# Patient Record
Sex: MALE | Race: WHITE | NOT HISPANIC OR LATINO | Employment: FULL TIME | URBAN - NONMETROPOLITAN AREA
[De-identification: names, ages, dates, MRNs, and addresses within clinical notes are randomized per-mention and may not be internally consistent; named-entity substitution may affect disease eponyms.]

---

## 2018-05-11 ENCOUNTER — APPOINTMENT (INPATIENT)
Dept: CT IMAGING | Facility: HOSPITAL | Age: 48
DRG: 309 | End: 2018-05-11
Payer: COMMERCIAL

## 2018-05-11 ENCOUNTER — HOSPITAL ENCOUNTER (INPATIENT)
Facility: HOSPITAL | Age: 48
LOS: 4 days | Discharge: HOME | DRG: 309 | End: 2018-05-15
Attending: EMERGENCY MEDICINE | Admitting: FAMILY MEDICINE
Payer: COMMERCIAL

## 2018-05-11 ENCOUNTER — APPOINTMENT (EMERGENCY)
Dept: RADIOLOGY | Facility: HOSPITAL | Age: 48
DRG: 309 | End: 2018-05-11
Payer: COMMERCIAL

## 2018-05-11 ENCOUNTER — APPOINTMENT (INPATIENT)
Dept: RADIOLOGY | Facility: HOSPITAL | Age: 48
DRG: 309 | End: 2018-05-11
Payer: COMMERCIAL

## 2018-05-11 ENCOUNTER — APPOINTMENT (EMERGENCY)
Dept: CT IMAGING | Facility: HOSPITAL | Age: 48
DRG: 309 | End: 2018-05-11
Payer: COMMERCIAL

## 2018-05-11 DIAGNOSIS — S06.0X9A CONCUSSION WITH LOSS OF CONSCIOUSNESS, INITIAL ENCOUNTER: Primary | ICD-10-CM

## 2018-05-11 DIAGNOSIS — I48.91 ATRIAL FIBRILLATION WITH RAPID VENTRICULAR RESPONSE (HCC): ICD-10-CM

## 2018-05-11 DIAGNOSIS — I48.91 ATRIAL FIBRILLATION WITH RVR (HCC): ICD-10-CM

## 2018-05-11 DIAGNOSIS — R93.89 ABNORMAL FINDING ON CT SCAN: ICD-10-CM

## 2018-05-11 PROBLEM — M25.512 ACUTE PAIN OF LEFT SHOULDER: Status: ACTIVE | Noted: 2018-05-11

## 2018-05-11 PROBLEM — E87.6 HYPOKALEMIA: Status: ACTIVE | Noted: 2018-05-11

## 2018-05-11 PROBLEM — D72.829 LEUKOCYTOSIS: Status: ACTIVE | Noted: 2018-05-11
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
## 2018-05-11 PROBLEM — R55 SYNCOPE AND COLLAPSE: Status: ACTIVE | Noted: 2018-05-11

## 2018-05-11 PROBLEM — S01.81XA FACIAL LACERATION: Status: ACTIVE | Noted: 2018-05-11

## 2018-05-11 PROBLEM — F17.200 SMOKER: Status: ACTIVE | Noted: 2018-05-11
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
## 2018-05-11 LAB
ABO GROUP BLD: NORMAL
AMPHETAMINES SERPL QL SCN: NEGATIVE
ANION GAP SERPL CALCULATED.3IONS-SCNC: 14 MMOL/L (ref 4–13)
APTT PPP: 26 SECONDS (ref 23–35)
BARBITURATES UR QL: NEGATIVE
BASE EX.OXY STD BLDV CALC-SCNC: 83.2 % (ref 60–80)
BASE EXCESS BLDV CALC-SCNC: -1.9 MMOL/L
BASOPHILS # BLD AUTO: 0.04 THOUSANDS/ΜL (ref 0–0.1)
BASOPHILS NFR BLD AUTO: 0 % (ref 0–1)
BENZODIAZ UR QL: NEGATIVE
BLD GP AB SCN SERPL QL: NEGATIVE
BUN SERPL-MCNC: 12 MG/DL (ref 5–25)
CALCIUM SERPL-MCNC: 8.7 MG/DL (ref 8.3–10.1)
CHLORIDE SERPL-SCNC: 102 MMOL/L (ref 100–108)
CO2 SERPL-SCNC: 23 MMOL/L (ref 21–32)
COCAINE UR QL: NEGATIVE
CREAT SERPL-MCNC: 1.07 MG/DL (ref 0.6–1.3)
EOSINOPHIL # BLD AUTO: 0.1 THOUSAND/ΜL (ref 0–0.61)
EOSINOPHIL NFR BLD AUTO: 1 % (ref 0–6)
ERYTHROCYTE [DISTWIDTH] IN BLOOD BY AUTOMATED COUNT: 11.7 % (ref 11.6–15.1)
GFR SERPL CREATININE-BSD FRML MDRD: 82 ML/MIN/1.73SQ M
GLUCOSE SERPL-MCNC: 136 MG/DL (ref 65–140)
GLUCOSE SERPL-MCNC: 182 MG/DL (ref 65–140)
HCO3 BLDV-SCNC: 20.9 MMOL/L (ref 24–30)
HCT VFR BLD AUTO: 40.6 % (ref 36.5–49.3)
HGB BLD-MCNC: 15 G/DL (ref 12–17)
INR PPP: 1.06 (ref 0.86–1.16)
LYMPHOCYTES # BLD AUTO: 5.74 THOUSANDS/ΜL (ref 0.6–4.47)
LYMPHOCYTES NFR BLD AUTO: 47 % (ref 14–44)
MAGNESIUM SERPL-MCNC: 1.9 MG/DL (ref 1.6–2.6)
MCH RBC QN AUTO: 31.3 PG (ref 26.8–34.3)
MCHC RBC AUTO-ENTMCNC: 36.9 G/DL (ref 31.4–37.4)
MCV RBC AUTO: 85 FL (ref 82–98)
METHADONE UR QL: NEGATIVE
MONOCYTES # BLD AUTO: 1.25 THOUSAND/ΜL (ref 0.17–1.22)
MONOCYTES NFR BLD AUTO: 10 % (ref 4–12)
NEUTROPHILS # BLD AUTO: 5.05 THOUSANDS/ΜL (ref 1.85–7.62)
NEUTS SEG NFR BLD AUTO: 42 % (ref 43–75)
O2 CT BLDV-SCNC: 17.7 ML/DL
OPIATES UR QL SCN: NEGATIVE
PCO2 BLDV: 30.6 MM HG (ref 42–50)
PCP UR QL: NEGATIVE
PH BLDV: 7.45 [PH] (ref 7.3–7.4)
PLATELET # BLD AUTO: 302 THOUSANDS/UL (ref 149–390)
PMV BLD AUTO: 9.3 FL (ref 8.9–12.7)
PO2 BLDV: 51.3 MM HG (ref 35–45)
POTASSIUM SERPL-SCNC: 2.9 MMOL/L (ref 3.5–5.3)
PROTHROMBIN TIME: 13.7 SECONDS (ref 12.1–14.4)
RBC # BLD AUTO: 4.79 MILLION/UL (ref 3.88–5.62)
RH BLD: POSITIVE
SODIUM SERPL-SCNC: 139 MMOL/L (ref 136–145)
SPECIMEN EXPIRATION DATE: NORMAL
THC UR QL: POSITIVE
TROPONIN I SERPL-MCNC: <0.02 NG/ML
WBC # BLD AUTO: 12.18 THOUSAND/UL (ref 4.31–10.16)

## 2018-05-11 PROCEDURE — 99285 EMERGENCY DEPT VISIT HI MDM: CPT

## 2018-05-11 PROCEDURE — 74175 CTA ABDOMEN W/CONTRAST: CPT

## 2018-05-11 PROCEDURE — 71045 X-RAY EXAM CHEST 1 VIEW: CPT

## 2018-05-11 PROCEDURE — 84484 ASSAY OF TROPONIN QUANT: CPT | Performed by: EMERGENCY MEDICINE

## 2018-05-11 PROCEDURE — 96375 TX/PRO/DX INJ NEW DRUG ADDON: CPT

## 2018-05-11 PROCEDURE — 73030 X-RAY EXAM OF SHOULDER: CPT

## 2018-05-11 PROCEDURE — 80048 BASIC METABOLIC PNL TOTAL CA: CPT | Performed by: EMERGENCY MEDICINE

## 2018-05-11 PROCEDURE — 70450 CT HEAD/BRAIN W/O DYE: CPT

## 2018-05-11 PROCEDURE — 86900 BLOOD TYPING SEROLOGIC ABO: CPT | Performed by: EMERGENCY MEDICINE

## 2018-05-11 PROCEDURE — 80307 DRUG TEST PRSMV CHEM ANLYZR: CPT | Performed by: EMERGENCY MEDICINE

## 2018-05-11 PROCEDURE — 85610 PROTHROMBIN TIME: CPT | Performed by: EMERGENCY MEDICINE

## 2018-05-11 PROCEDURE — 83735 ASSAY OF MAGNESIUM: CPT | Performed by: EMERGENCY MEDICINE

## 2018-05-11 PROCEDURE — 86901 BLOOD TYPING SEROLOGIC RH(D): CPT | Performed by: EMERGENCY MEDICINE

## 2018-05-11 PROCEDURE — 72125 CT NECK SPINE W/O DYE: CPT

## 2018-05-11 PROCEDURE — 99291 CRITICAL CARE FIRST HOUR: CPT | Performed by: PHYSICIAN ASSISTANT

## 2018-05-11 PROCEDURE — 85730 THROMBOPLASTIN TIME PARTIAL: CPT | Performed by: EMERGENCY MEDICINE

## 2018-05-11 PROCEDURE — 70486 CT MAXILLOFACIAL W/O DYE: CPT

## 2018-05-11 PROCEDURE — 71275 CT ANGIOGRAPHY CHEST: CPT

## 2018-05-11 PROCEDURE — 96374 THER/PROPH/DIAG INJ IV PUSH: CPT

## 2018-05-11 PROCEDURE — 93005 ELECTROCARDIOGRAM TRACING: CPT

## 2018-05-11 PROCEDURE — 82948 REAGENT STRIP/BLOOD GLUCOSE: CPT

## 2018-05-11 PROCEDURE — 85025 COMPLETE CBC W/AUTO DIFF WBC: CPT | Performed by: EMERGENCY MEDICINE

## 2018-05-11 PROCEDURE — 71046 X-RAY EXAM CHEST 2 VIEWS: CPT

## 2018-05-11 PROCEDURE — 96365 THER/PROPH/DIAG IV INF INIT: CPT

## 2018-05-11 PROCEDURE — 86850 RBC ANTIBODY SCREEN: CPT | Performed by: EMERGENCY MEDICINE

## 2018-05-11 PROCEDURE — 36415 COLL VENOUS BLD VENIPUNCTURE: CPT | Performed by: EMERGENCY MEDICINE

## 2018-05-11 PROCEDURE — 0HQ1XZZ REPAIR FACE SKIN, EXTERNAL APPROACH: ICD-10-PCS | Performed by: FAMILY MEDICINE

## 2018-05-11 PROCEDURE — 82805 BLOOD GASES W/O2 SATURATION: CPT | Performed by: EMERGENCY MEDICINE

## 2018-05-11 RX ORDER — DILTIAZEM HYDROCHLORIDE 5 MG/ML
15 INJECTION INTRAVENOUS ONCE
Status: COMPLETED | OUTPATIENT
Start: 2018-05-11 | End: 2018-05-11

## 2018-05-11 RX ORDER — MORPHINE SULFATE 2 MG/ML
1 INJECTION, SOLUTION INTRAMUSCULAR; INTRAVENOUS EVERY 4 HOURS PRN
Status: DISCONTINUED | OUTPATIENT
Start: 2018-05-11 | End: 2018-05-15 | Stop reason: HOSPADM

## 2018-05-11 RX ORDER — ACETAMINOPHEN 325 MG/1
650 TABLET ORAL EVERY 6 HOURS PRN
Status: DISCONTINUED | OUTPATIENT
Start: 2018-05-11 | End: 2018-05-15 | Stop reason: HOSPADM

## 2018-05-11 RX ORDER — POTASSIUM CHLORIDE 14.9 MG/ML
20 INJECTION INTRAVENOUS ONCE
Status: COMPLETED | OUTPATIENT
Start: 2018-05-11 | End: 2018-05-11

## 2018-05-11 RX ORDER — NICOTINE 21 MG/24HR
1 PATCH, TRANSDERMAL 24 HOURS TRANSDERMAL DAILY
Status: DISCONTINUED | OUTPATIENT
Start: 2018-05-12 | End: 2018-05-15 | Stop reason: HOSPADM

## 2018-05-11 RX ORDER — LIDOCAINE 50 MG/G
1 PATCH TOPICAL ONCE
Status: COMPLETED | OUTPATIENT
Start: 2018-05-11 | End: 2018-05-12

## 2018-05-11 RX ORDER — SODIUM CHLORIDE 9 MG/ML
125 INJECTION, SOLUTION INTRAVENOUS CONTINUOUS
Status: DISCONTINUED | OUTPATIENT
Start: 2018-05-11 | End: 2018-05-13

## 2018-05-11 RX ORDER — ONDANSETRON 2 MG/ML
4 INJECTION INTRAMUSCULAR; INTRAVENOUS EVERY 6 HOURS PRN
Status: DISCONTINUED | OUTPATIENT
Start: 2018-05-11 | End: 2018-05-15 | Stop reason: HOSPADM

## 2018-05-11 RX ORDER — FENTANYL CITRATE 50 UG/ML
25 INJECTION, SOLUTION INTRAMUSCULAR; INTRAVENOUS ONCE
Status: COMPLETED | OUTPATIENT
Start: 2018-05-11 | End: 2018-05-11

## 2018-05-11 RX ADMIN — DILTIAZEM HYDROCHLORIDE 15 MG: 5 INJECTION INTRAVENOUS at 18:19

## 2018-05-11 RX ADMIN — FENTANYL CITRATE 25 MCG: 50 INJECTION, SOLUTION INTRAMUSCULAR; INTRAVENOUS at 19:52

## 2018-05-11 RX ADMIN — SODIUM CHLORIDE 1000 ML: 0.9 INJECTION, SOLUTION INTRAVENOUS at 20:02

## 2018-05-11 RX ADMIN — Medication 5 MG/HR: at 19:15

## 2018-05-11 RX ADMIN — SODIUM CHLORIDE 1000 ML: 0.9 INJECTION, SOLUTION INTRAVENOUS at 17:55

## 2018-05-11 RX ADMIN — SODIUM CHLORIDE 125 ML/HR: 0.9 INJECTION, SOLUTION INTRAVENOUS at 23:29

## 2018-05-11 RX ADMIN — IOHEXOL 100 ML: 350 INJECTION, SOLUTION INTRAVENOUS at 21:55

## 2018-05-11 RX ADMIN — LIDOCAINE 1 PATCH: 50 PATCH CUTANEOUS at 22:35

## 2018-05-11 RX ADMIN — POTASSIUM CHLORIDE 20 MEQ: 200 INJECTION, SOLUTION INTRAVENOUS at 19:54

## 2018-05-11 NOTE — ED PROVIDER NOTES
History  Chief Complaint   Patient presents with    Fall     c/o fall with facial laceration  +LOC according to EMS  Pt alert and oriented appropriately at this time  Patient: Devi Zuñiga  81 y o /male  YOB: 1970  MRN: [id number]  PCP: Jason Means MD  Date of evaluation: 5/11/2018    (N B  Voice-recognition software may have been used in the preparation of this document )    History is from patient and EMS  The patient was mowing the lawn  That is all he remembers until he woke up lying in his yard  He was bleeding from his face  He had fallen with his head on the sidewalk  Per EMS, on route to the hospital, he had repetitive questioning  He was found to be in a rapid AFib at the scene  His rate lowered on its own  On arrival his rate and blood pressure were acceptable  ON ARRIVAL:  Airway patent, face stable, trachea midline  Respirations nonlabored, symmetric expansion, no flail  Pulses symmetric and equal, no uncontrolled bleeding  Neuro nonfocal grossly  Exposure completed  Environmental factors addressed  History provided by:  Patient and EMS personnel  Syncope   Episode history:  Single  Most recent episode: Today  Progression:  Resolved  Chronicity:  New  Context comment:  Mowing the lawn  Ineffective treatments:  None tried  Associated symptoms: no anxiety, no chest pain, no difficulty breathing, no dizziness, no fever, no focal weakness, no headaches, no nausea, no palpitations, no shortness of breath, no visual change, no vomiting and no weakness    Risk factors: no seizures        None       Past Medical History:   Diagnosis Date    H/O von Willebrand's disease     Migraines     Wrist fracture     left       Past Surgical History:   Procedure Laterality Date    WISDOM TOOTH EXTRACTION         History reviewed  No pertinent family history  I have reviewed and agree with the history as documented      Social History   Substance Use Topics  Smoking status: Current Every Day Smoker     Packs/day: 1 00     Types: Cigarettes    Smokeless tobacco: Never Used    Alcohol use No        Review of Systems   Reason unable to perform ROS: ROS limited at first by AMS - concussion, dysrhythmia  Constitutional: Negative  Negative for chills and fever  HENT: Negative  Negative for trouble swallowing and voice change  Eyes: Negative for photophobia and visual disturbance  Respiratory: Negative  Negative for cough and shortness of breath  Cardiovascular: Positive for syncope  Negative for chest pain and palpitations  Gastrointestinal: Negative  Negative for abdominal pain, nausea and vomiting  Endocrine: Negative  Negative for polydipsia and polyuria  Genitourinary: Negative  Negative for difficulty urinating and urgency  Musculoskeletal: Negative  Negative for back pain and neck pain  Shoulder pain   Skin: Negative  Negative for rash and wound  Allergic/Immunologic: Negative for immunocompromised state  Neurological: Positive for syncope  Negative for dizziness, focal weakness, speech difficulty, weakness, light-headedness and headaches  Hematological: Negative  Negative for adenopathy  Does not bruise/bleed easily  All other systems reviewed and are negative        Physical Exam  ED Triage Vitals   Temperature Pulse Respirations Blood Pressure SpO2   05/11/18 1749 05/11/18 1749 05/11/18 1749 05/11/18 1749 05/11/18 1749   (!) 96 7 °F (35 9 °C) (!) 158 22 116/77 100 %      Temp Source Heart Rate Source Patient Position - Orthostatic VS BP Location FiO2 (%)   05/11/18 1901 05/11/18 1749 05/11/18 1804 05/11/18 1749 --   Temporal Monitor Lying Right arm       Pain Score       05/11/18 1749       No Pain           Orthostatic Vital Signs  Vitals:    05/12/18 1200 05/12/18 1300 05/12/18 1400 05/12/18 1500   BP: 103/77 112/68 105/71 99/64   Pulse: 81 68 74 83   Patient Position - Orthostatic VS:    Lying       Physical Exam Constitutional: He is oriented to person, place, and time  He appears well-developed and well-nourished  HENT:   Head: Normocephalic  Head is with laceration (Right eyelid; chin)  Head is without raccoon's eyes and without Motley's sign  Mouth/Throat: Uvula is midline, oropharynx is clear and moist and mucous membranes are normal    Voice normal   Eyes: EOM are normal  Pupils are equal, round, and reactive to light  Neck: Phonation normal    Collar maintained   Cardiovascular: Intact distal pulses  An irregularly irregular rhythm present  Tachycardia present  Pulmonary/Chest: Effort normal  No stridor  Abdominal: Soft  Bowel sounds are normal    Neurological: He is alert and oriented to person, place, and time  He has normal strength  No cranial nerve deficit  GCS eye subscore is 4  GCS verbal subscore is 5  GCS motor subscore is 6  Skin: Skin is warm and dry  Psychiatric: He has a normal mood and affect  His speech is normal and behavior is normal    Nursing note and vitals reviewed        ED Medications  Medications   sodium chloride 0 9 % infusion (125 mL/hr Intravenous New Bag 5/12/18 1452)   ondansetron (ZOFRAN) injection 4 mg (not administered)   nicotine (NICODERM CQ) 21 mg/24 hr TD 24 hr patch 1 patch (1 patch Transdermal Not Given 5/12/18 0812)   acetaminophen (TYLENOL) tablet 650 mg (650 mg Oral Given 5/12/18 1532)   morphine injection 1 mg (not administered)   metoprolol tartrate (LOPRESSOR) tablet 25 mg (25 mg Oral Given 5/12/18 1158)   iohexol (OMNIPAQUE) 350 MG/ML injection (MULTI-DOSE) 100 mL (not administered)   aspirin chewable tablet 81 mg (81 mg Oral Given 5/12/18 1411)   enoxaparin (LOVENOX) subcutaneous injection 40 mg (40 mg Subcutaneous Given 5/12/18 1411)    EMS REPLENISHMENT MED ( Does not apply Given to EMS 5/11/18 1818)   diltiazem (CARDIZEM) injection 15 mg (15 mg Intravenous Given 5/11/18 1819)   diltiazem (CARDIZEM) 125 mg in sodium chloride 0 9 % 125 mL infusion (2 5 mg/hr Intravenous Rate/Dose Change 5/11/18 2045)   potassium chloride 20 mEq IVPB (premix) (0 mEq Intravenous Stopped 5/11/18 2230)   fentanyl citrate (PF) 100 MCG/2ML 25 mcg (25 mcg Intravenous Given 5/11/18 1952)   sodium chloride 0 9 % bolus 1,000 mL (0 mL Intravenous Stopped 5/11/18 1810)   sodium chloride 0 9 % bolus 1,000 mL (0 mL Intravenous Stopped 5/11/18 2230)   sodium chloride 0 9 % bolus 1,000 mL (0 mL Intravenous Stopped 5/11/18 1810)   lidocaine (LIDODERM) 5 % patch 1 patch (1 patch Transdermal Patch Removed 5/12/18 1030)   iohexol (OMNIPAQUE) 350 MG/ML injection (MULTI-DOSE) 100 mL (100 mL Intravenous Given 5/11/18 2155)   potassium chloride (K-DUR,KLOR-CON) CR tablet 20 mEq (20 mEq Oral Given 5/12/18 0033)       Diagnostic Studies  Results Reviewed     Procedure Component Value Units Date/Time    Hemoglobin A1C w/ EAG Estimation [[id number]] Collected:  05/12/18 0508    Lab Status:  Final result Specimen:  Blood from Arm, Left Updated:  05/12/18 1314     Hemoglobin A1C 5 5 %       mg/dl     Rapid drug screen, urine [[id number]]  (Abnormal) Collected:  05/11/18 1917    Lab Status:  Final result Specimen:  Urine from Urine, Clean Catch Updated:  05/11/18 1939     Amph/Meth UR Negative     Barbiturate Ur Negative     Benzodiazepine Urine Negative     Cocaine Urine Negative     Methadone Urine Negative     Opiate Urine Negative     PCP Ur Negative     THC Urine Positive (A)    Narrative:         FOR MEDICAL PURPOSES ONLY  IF CONFIRMATION NEEDED PLEASE CONTACT THE LAB WITHIN 5 DAYS  Drug Screen Cutoff Levels:  AMPHETAMINE/METHAMPHETAMINES  1000 ng/mL  BARBITURATES     200 ng/mL  BENZODIAZEPINES     200 ng/mL  COCAINE      300 ng/mL  METHADONE      300 ng/mL  OPIATES      300 ng/mL  PHENCYCLIDINE     25 ng/mL  THC       50 ng/mL    Presumptive report  If requested, specimen will be sent to reference lab for confirmation      Magnesium [[id number]]  (Normal) Collected:  05/11/18 1842    Lab Status: Final result Specimen:  Blood Updated:  05/11/18 1849     Magnesium 1 9 mg/dL     Troponin I [[id number]]  (Normal) Collected:  05/11/18 1759    Lab Status:  Final result Specimen:  Blood from Arm, Left Updated:  05/11/18 1832     Troponin I <0 02 ng/mL     Narrative:         [employer] Chemistry analyzer 99% cutoff is > 0 04 ng/mL in network labs    o cTnI 99% cutoff is useful only when applied to patients in the clinical setting of myocardial ischemia  o cTnI 99% cutoff should be interpreted in the context of clinical history, ECG findings and possibly cardiac imaging to establish correct diagnosis  o cTnI 99% cutoff may be suggestive but clearly not indicative of a coronary event without the clinical setting of myocardial ischemia  Tatyana Schaefer [[id number]]  (Normal) Collected:  05/11/18 1759    Lab Status:  Final result Specimen:  Blood from Arm, Left Updated:  05/11/18 1820     Protime 13 7 seconds      INR 1 06    APTT [[id number]]  (Normal) Collected:  05/11/18 1759    Lab Status:  Final result Specimen:  Blood from Arm, Left Updated:  05/11/18 1820     PTT 26 seconds     Basic metabolic panel [[id number]]  (Abnormal) Collected:  05/11/18 1759    Lab Status:  Final result Specimen:  Blood from Arm, Left Updated:  05/11/18 1819     Sodium 139 mmol/L      Potassium 2 9 (L) mmol/L      Chloride 102 mmol/L      CO2 23 mmol/L      Anion Gap 14 (H) mmol/L      BUN 12 mg/dL      Creatinine 1 07 mg/dL      Glucose 182 (H) mg/dL      Calcium 8 7 mg/dL      eGFR 82 ml/min/1 73sq m     Narrative:         National Kidney Disease Education Program recommendations are as follows:  GFR calculation is accurate only with a steady state creatinine  Chronic Kidney disease less than 60 ml/min/1 73 sq  meters  Kidney failure less than 15 ml/min/1 73 sq  meters      Blood gas, venous [[id number]]  (Abnormal) Collected:  05/11/18 1759    Lab Status:  Final result Specimen:  Blood from Arm, Left Updated:  05/11/18 1809     pH, Porfirio 7 452 (H) pCO2, Porfirio 30 6 (L) mm Hg      pO2, Porfirio 51 3 (H) mm Hg      HCO3, Porfirio 20 9 (L) mmol/L      Base Excess, Porfirio -1 9 mmol/L      O2 Content, Porfirio 17 7 ml/dL      O2 HGB, VENOUS 83 2 (H) %     CBC and differential [[id number]]  (Abnormal) Collected:  05/11/18 1759    Lab Status:  Final result Specimen:  Blood from Arm, Left Updated:  05/11/18 1808     WBC 12 18 (H) Thousand/uL      RBC 4 79 Million/uL      Hemoglobin 15 0 g/dL      Hematocrit 40 6 %      MCV 85 fL      MCH 31 3 pg      MCHC 36 9 g/dL      RDW 11 7 %      MPV 9 3 fL      Platelets 584 Thousands/uL      Neutrophils Relative 42 (L) %      Lymphocytes Relative 47 (H) %      Monocytes Relative 10 %      Eosinophils Relative 1 %      Basophils Relative 0 %      Neutrophils Absolute 5 05 Thousands/µL      Lymphocytes Absolute 5 74 (H) Thousands/µL      Monocytes Absolute 1 25 (H) Thousand/µL      Eosinophils Absolute 0 10 Thousand/µL      Basophils Absolute 0 04 Thousands/µL     Fingerstick Glucose (POCT) [[id number]]  (Normal) Collected:  05/11/18 1805    Lab Status:  Final result Updated:  05/11/18 1808     POC Glucose 136 mg/dl                  CTA dissection protocol chest and abdomen   Final Result by Bonilla Wynn MD (05/11 2217)         1  No evidence of aneurysm or dissection  2   Clear lungs  3   Mild hepatic steatosis and hepatomegaly  4   Superior indentation on the celiac axis best seen on the sagittal view 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 to median arcuate ligament syndrome  Workstation performed: [id number]         XR chest 2 views   Final Result by Diamante Chance MD (05/11 2056)   Persistent right paratracheal density suspicious for brachiocephalic vessels or adenopathy    Vascular injury considered less likely            Workstation performed: [id number]         XR chest 1 view portable   Final Result by Bonilla Wynn MD (05/11 1944)      Questionable superior mediastinal widening which could be from brachiocephalic vessels/positioning, follow-up with repeat PA and lateral chest x-rays  Workstation performed: [id number]         CT facial bones wo contrast   Final Result by Chayito Park MD (05/11 1911)      No facial bone fracture or subluxation  Workstation performed: [id number]         CT head wo contrast   Final Result by Chayito Park MD (05/11 1908)      No acute intracranial abnormality  Workstation performed: [id number]         CT spine cervical wo contrast   Final Result by Chayito Park MD (05/11 1907)      No cervical spine fracture or traumatic malalignment  Subcentimeter metallic density noted within the dependent portion of the hypopharynx at the level of the piriform sinuses  The findings may be related to an aspirated dental amalgam   ENT consultation is suggested  I personally discussed this study with Neida Ovalles 5/11/2018 at 7:02 PM                    Workstation performed: [id number]         XR shoulder 2+ views LEFT   Final Result by Chayito Park MD (05/11 1821)      No acute osseous abnormality              Workstation performed: [id number]         CTA head and neck w wo contrast    (Results Pending)              Procedures  CriticalCare Time  Performed by: Clive Segura  Authorized by: Clive Segura     Critical care provider statement:     Critical care time (minutes):  35    Critical care time was exclusive of:  Separately billable procedures and treating other patients and teaching time    Critical care was necessary to treat or prevent imminent or life-threatening deterioration of the following conditions:  Trauma and circulatory failure    Critical care was time spent personally by me on the following activities:  Obtaining history from patient or surrogate, development of treatment plan with patient or surrogate, evaluation of patient's response to treatment, examination of patient, ordering and review of laboratory studies, ordering and review of radiographic studies and re-evaluation of patient's condition               Phone Contacts  ED Phone Contact    ED Course  ED Course as of May 12 1735   Fri May 11, 2018   1842 BP better  Pt OK to go to CT     2033 Pt is alert, NAD  [facility] saw him in Rm 14     2054 Radiology over-read of 2d chest XR - Radiologist did not feel 100% reassured - suggested CT     2055 Radiologist aware this is a Trauma patient and should be prioritized  2232 CTA resulted  K  Lukas PERSON for [facility]  aware            HEART Risk Score      Most Recent Value   History  0 Filed at: 05/11/2018 2049   ECG  0 Filed at: 05/11/2018 2049   Age  1 Filed at: 05/11/2018 2049   Risk Factors  0 Filed at: 05/11/2018 2049   Troponin  0 Filed at: 05/11/2018 2049   Heart Score Risk Calculator   History  0 Filed at: 05/11/2018 2049   ECG  0 Filed at: 05/11/2018 2049   Age  1 Filed at: 05/11/2018 2049   Risk Factors  0 Filed at: 05/11/2018 2049   Troponin  0 Filed at: 05/11/2018 2049   HEART Score  1 Filed at: 05/11/2018 2049   HEART Score  1 Filed at: 05/11/2018 2049                            MDM  Number of Diagnoses or Management Options  Atrial fibrillation with rapid ventricular response [facility]):   Concussion with loss of consciousness, initial encounter:      Amount and/or Complexity of Data Reviewed  Tests in the radiology section of CPT®: ordered and reviewed  Tests in the medicine section of CPT®: ordered and reviewed  Decide to obtain previous medical records or to obtain history from someone other than the patient: yes  Obtain history from someone other than the patient: yes  Independent visualization of images, tracings, or specimens: yes    Risk of Complications, Morbidity, and/or Mortality  Presenting problems: high    Patient Progress  Patient progress: improved    The patient presented with a condition in which there was a high probability of imminent or life-threatening deterioration, and critical care services (excluding separately billable procedures) totalled 30-74 minutes  Disposition  Final diagnoses:   Concussion with loss of consciousness, initial encounter   Atrial fibrillation with rapid ventricular response ([facility] [address] )     Time reflects when diagnosis was documented in both MDM as applicable and the Disposition within this note     Time User Action Codes Description Comment    5/11/2018  7:23 PM Lise ROSARIO Add [S06 [id number]] Concussion with loss of consciousness, initial encounter     5/11/2018  7:23 PM Lise ROSARIO Add [I48 [id number]] Atrial fibrillation with rapid ventricular response ([facility] [address] )     5/12/2018 12:49 AM Pepe Gaytan Add [R93 [id number]] Abnormal finding on CT scan       ED Disposition     ED Disposition Condition Comment    Admit  Case was discussed with Kacey Calderon PA-C for [facility]   and the patient's admission status was agreed to be Admission Status: inpatient status to the service of Dr Celia Cole   Follow-up Information    None       There are no discharge medications for this patient  No discharge procedures on file      ED Provider  Electronically Signed by           Eugene Shabazz MD  05/11/18 2031       Eugene Shabazz MD  05/11/18 2033       Eugeen Shabazz MD  05/12/18 6082

## 2018-05-12 ENCOUNTER — APPOINTMENT (INPATIENT)
Dept: CT IMAGING | Facility: HOSPITAL | Age: 48
DRG: 309 | End: 2018-05-12
Payer: COMMERCIAL

## 2018-05-12 LAB
ALBUMIN SERPL BCP-MCNC: 2.9 G/DL (ref 3.5–5)
ALP SERPL-CCNC: 75 U/L (ref 46–116)
ALT SERPL W P-5'-P-CCNC: 25 U/L (ref 12–78)
ANION GAP SERPL CALCULATED.3IONS-SCNC: 9 MMOL/L (ref 4–13)
APTT PPP: 28 SECONDS (ref 23–35)
AST SERPL W P-5'-P-CCNC: 15 U/L (ref 5–45)
BASOPHILS # BLD AUTO: 0.01 THOUSANDS/ΜL (ref 0–0.1)
BASOPHILS NFR BLD AUTO: 0 % (ref 0–1)
BILIRUB SERPL-MCNC: 0.3 MG/DL (ref 0.2–1)
BUN SERPL-MCNC: 11 MG/DL (ref 5–25)
CALCIUM SERPL-MCNC: 8.1 MG/DL (ref 8.3–10.1)
CHLORIDE SERPL-SCNC: 108 MMOL/L (ref 100–108)
CO2 SERPL-SCNC: 24 MMOL/L (ref 21–32)
CREAT SERPL-MCNC: 0.83 MG/DL (ref 0.6–1.3)
EOSINOPHIL # BLD AUTO: 0.02 THOUSAND/ΜL (ref 0–0.61)
EOSINOPHIL NFR BLD AUTO: 0 % (ref 0–6)
ERYTHROCYTE [DISTWIDTH] IN BLOOD BY AUTOMATED COUNT: 12.1 % (ref 11.6–15.1)
EST. AVERAGE GLUCOSE BLD GHB EST-MCNC: 111 MG/DL
GFR SERPL CREATININE-BSD FRML MDRD: 105 ML/MIN/1.73SQ M
GLUCOSE SERPL-MCNC: 117 MG/DL (ref 65–140)
HBA1C MFR BLD: 5.5 % (ref 4.2–6.3)
HCT VFR BLD AUTO: 37.8 % (ref 36.5–49.3)
HGB BLD-MCNC: 13.5 G/DL (ref 12–17)
INR PPP: 1.1 (ref 0.86–1.16)
LYMPHOCYTES # BLD AUTO: 1.55 THOUSANDS/ΜL (ref 0.6–4.47)
LYMPHOCYTES NFR BLD AUTO: 17 % (ref 14–44)
MAGNESIUM SERPL-MCNC: 1.9 MG/DL (ref 1.6–2.6)
MCH RBC QN AUTO: 31.2 PG (ref 26.8–34.3)
MCHC RBC AUTO-ENTMCNC: 35.7 G/DL (ref 31.4–37.4)
MCV RBC AUTO: 87 FL (ref 82–98)
MONOCYTES # BLD AUTO: 1.25 THOUSAND/ΜL (ref 0.17–1.22)
MONOCYTES NFR BLD AUTO: 14 % (ref 4–12)
NEUTROPHILS # BLD AUTO: 6.29 THOUSANDS/ΜL (ref 1.85–7.62)
NEUTS SEG NFR BLD AUTO: 69 % (ref 43–75)
PHOSPHATE SERPL-MCNC: 3.6 MG/DL (ref 2.7–4.5)
PLATELET # BLD AUTO: 194 THOUSANDS/UL (ref 149–390)
PMV BLD AUTO: 9.6 FL (ref 8.9–12.7)
POTASSIUM SERPL-SCNC: 4.1 MMOL/L (ref 3.5–5.3)
PROT SERPL-MCNC: 5.7 G/DL (ref 6.4–8.2)
PROTHROMBIN TIME: 14.1 SECONDS (ref 12.1–14.4)
RBC # BLD AUTO: 4.33 MILLION/UL (ref 3.88–5.62)
SODIUM SERPL-SCNC: 141 MMOL/L (ref 136–145)
WBC # BLD AUTO: 9.12 THOUSAND/UL (ref 4.31–10.16)

## 2018-05-12 PROCEDURE — 83036 HEMOGLOBIN GLYCOSYLATED A1C: CPT | Performed by: PHYSICIAN ASSISTANT

## 2018-05-12 PROCEDURE — G8987 SELF CARE CURRENT STATUS: HCPCS

## 2018-05-12 PROCEDURE — 97163 PT EVAL HIGH COMPLEX 45 MIN: CPT | Performed by: PHYSICAL THERAPIST

## 2018-05-12 PROCEDURE — 85025 COMPLETE CBC W/AUTO DIFF WBC: CPT | Performed by: PHYSICIAN ASSISTANT

## 2018-05-12 PROCEDURE — 93005 ELECTROCARDIOGRAM TRACING: CPT

## 2018-05-12 PROCEDURE — 97167 OT EVAL HIGH COMPLEX 60 MIN: CPT

## 2018-05-12 PROCEDURE — 84100 ASSAY OF PHOSPHORUS: CPT | Performed by: PHYSICIAN ASSISTANT

## 2018-05-12 PROCEDURE — 80053 COMPREHEN METABOLIC PANEL: CPT | Performed by: PHYSICIAN ASSISTANT

## 2018-05-12 PROCEDURE — 97116 GAIT TRAINING THERAPY: CPT | Performed by: PHYSICAL THERAPIST

## 2018-05-12 PROCEDURE — 85730 THROMBOPLASTIN TIME PARTIAL: CPT | Performed by: PHYSICIAN ASSISTANT

## 2018-05-12 PROCEDURE — G8978 MOBILITY CURRENT STATUS: HCPCS | Performed by: PHYSICAL THERAPIST

## 2018-05-12 PROCEDURE — 85245 CLOT FACTOR VIII VW RISTOCTN: CPT | Performed by: PHYSICIAN ASSISTANT

## 2018-05-12 PROCEDURE — 85610 PROTHROMBIN TIME: CPT | Performed by: PHYSICIAN ASSISTANT

## 2018-05-12 PROCEDURE — G8989 SELF CARE D/C STATUS: HCPCS

## 2018-05-12 PROCEDURE — 83735 ASSAY OF MAGNESIUM: CPT | Performed by: PHYSICIAN ASSISTANT

## 2018-05-12 PROCEDURE — 97530 THERAPEUTIC ACTIVITIES: CPT

## 2018-05-12 PROCEDURE — 99232 SBSQ HOSP IP/OBS MODERATE 35: CPT | Performed by: PHYSICIAN ASSISTANT

## 2018-05-12 PROCEDURE — G8979 MOBILITY GOAL STATUS: HCPCS | Performed by: PHYSICAL THERAPIST

## 2018-05-12 PROCEDURE — G8988 SELF CARE GOAL STATUS: HCPCS

## 2018-05-12 RX ORDER — OXYCODONE HYDROCHLORIDE 5 MG/1
5 TABLET ORAL ONCE
Status: COMPLETED | OUTPATIENT
Start: 2018-05-12 | End: 2018-05-12

## 2018-05-12 RX ORDER — POTASSIUM CHLORIDE 20 MEQ/1
20 TABLET, EXTENDED RELEASE ORAL ONCE
Status: COMPLETED | OUTPATIENT
Start: 2018-05-12 | End: 2018-05-12

## 2018-05-12 RX ORDER — ASPIRIN 81 MG/1
81 TABLET, CHEWABLE ORAL DAILY
Status: DISCONTINUED | OUTPATIENT
Start: 2018-05-12 | End: 2018-05-15 | Stop reason: HOSPADM

## 2018-05-12 RX ADMIN — METOPROLOL TARTRATE 25 MG: 25 TABLET ORAL at 21:08

## 2018-05-12 RX ADMIN — ACETAMINOPHEN 650 MG: 325 TABLET, FILM COATED ORAL at 15:32

## 2018-05-12 RX ADMIN — SODIUM CHLORIDE 125 ML/HR: 0.9 INJECTION, SOLUTION INTRAVENOUS at 06:49

## 2018-05-12 RX ADMIN — POTASSIUM CHLORIDE 20 MEQ: 1500 TABLET, EXTENDED RELEASE ORAL at 00:33

## 2018-05-12 RX ADMIN — SODIUM CHLORIDE 125 ML/HR: 0.9 INJECTION, SOLUTION INTRAVENOUS at 14:52

## 2018-05-12 RX ADMIN — OXYCODONE HYDROCHLORIDE 5 MG: 5 TABLET ORAL at 19:36

## 2018-05-12 RX ADMIN — SODIUM CHLORIDE 125 ML/HR: 0.9 INJECTION, SOLUTION INTRAVENOUS at 22:30

## 2018-05-12 RX ADMIN — METOPROLOL TARTRATE 25 MG: 25 TABLET ORAL at 11:58

## 2018-05-12 RX ADMIN — ENOXAPARIN SODIUM 40 MG: 40 INJECTION SUBCUTANEOUS at 14:11

## 2018-05-12 RX ADMIN — ASPIRIN 81 MG 81 MG: 81 TABLET ORAL at 14:11

## 2018-05-12 NOTE — ASSESSMENT & PLAN NOTE
-Potassium at 2 9 on admission and is noted to be 3 0 this am    -Replace potassium  -repeat labs in am

## 2018-05-12 NOTE — SOCIAL WORK
Case management met with the patient to assess the prior level of function and form a safe d/c plan  The patient was given and I reviewed the case management  discharge checklist with the patient  The patient is aware that the d/c planning starts on the day of admission and that if she  has any questions or needs they  is to contact case management   He patient is at home with his wife and 2 children and he is independent at home with all adls and he uses no dme he is an  and he does drive and he has no needs at this time on d/c His pcp Dr Velvet Duenas he uses [facility] in [address]

## 2018-05-12 NOTE — ASSESSMENT & PLAN NOTE
-This is probably new onset as patient denies prior hx   -Discovered by EMS after syncope/fall with facial injuries  -Started on cardizem drip in ER  -Admit to ICU  -Telemetry monitoring  -Continue Cardizem Drip  -IV normal saline  -AM labs  -ECHO  -Cardiology consult

## 2018-05-12 NOTE — ED PROCEDURE NOTE
PROCEDURE  Lac Repair  Date/Time: 5/11/2018 8:29 PM  Performed by: Raul Samuel  Authorized by: Raul Samuel   Consent: Verbal consent obtained    Consent given by: patient  Patient understanding: patient states understanding of the procedure being performed  Patient identity confirmed: verbally with patient  Body area: head/neck  Location details: chin  Laceration length: 3 cm  Foreign bodies: no foreign bodies  Tendon involvement: none  Nerve involvement: none    Sedation:  Patient sedated: no      Procedure Details:  Irrigation solution: saline  Irrigation method: syringe  Amount of cleaning: standard  Debridement: none  Degree of undermining: none  Skin closure: glue  Approximation: close  Approximation difficulty: simple  Patient tolerance: Patient tolerated the procedure well with no immediate complications           Linda Stapleton MD  05/11/18 2030

## 2018-05-12 NOTE — PHYSICAL THERAPY NOTE
PT Evaluation        05/12/18 4355   Note Type   Note type Eval/Treat   Pain Assessment   Pain Score 4   Pain Location Shoulder   Home Living   Additional Comments See OT evaluation for details    Prior Function   Comments Pt see OT evaluation for details    Restrictions/Precautions   Other Precautions Telemetry;Multiple lines   Cognition   Overall Cognitive Status WFL   Arousal/Participation Alert   Orientation Level Oriented X4   Memory Within functional limits   Following Commands Follows all commands and directions without difficulty   RLE Assessment   RLE Assessment WFL   LLE Assessment   LLE Assessment WFL   Coordination   Sensation WFL   Light Touch   RLE Light Touch Grossly intact   LLE Light Touch Grossly intact   Bed Mobility   Supine to Sit 7  Independent   Additional Comments Pt seated in chair at the of the session    Transfers   Sit to Stand 7  Independent   Stand to Sit 7  Independent   Ambulation/Elevation   Gait pattern WNL   Assistive Device None   Distance 250 feet   Balance   Static Sitting Good   Dynamic Sitting Good   Static Standing Good   Dynamic Standing Good   Ambulatory Good   Endurance Deficit   Endurance Deficit No   Activity Tolerance   Activity Tolerance Patient tolerated treatment well   Assessment   Prognosis Good   Assessment Pt is a 53year old male with a dx of atrial fibrilation, with RVR  Pt is ambulating at an independent level, and will be D/C from PT caseload      Plan   PT Frequency One time visit   Recommendation   Recommendation D/C PT   PT - OK to Discharge Yes   Additional Comments When Medically Stable    Pt in bed when PT entered, Pt seated in chair when PT left, with chair alarm on, and call bell in reach, SCD reapplied

## 2018-05-12 NOTE — ED PROCEDURE NOTE
PROCEDURE  Lac Repair  Date/Time: 5/11/2018 8:28 PM  Performed by: Erasmo Ahmadi by: Geo Thomas   Consent: Verbal consent obtained    Consent given by: patient  Patient understanding: patient states understanding of the procedure being performed  Body area: head/neck  Location details: right eyelid  Laceration length: 1 5 cm  Foreign bodies: no foreign bodies  Tendon involvement: none  Nerve involvement: none    Sedation:  Patient sedated: no      Procedure Details:  Irrigation solution: saline  Irrigation method: syringe  Amount of cleaning: standard  Debridement: none  Degree of undermining: none  Skin closure: glue  Approximation: close  Approximation difficulty: simple  Patient tolerance: Patient tolerated the procedure well with no immediate complications           Lizett Purdy MD  05/11/18 2029

## 2018-05-12 NOTE — ASSESSMENT & PLAN NOTE
-This is probably new onset as patient denies prior hx   -Discovered by EMS after syncope/fall with facial injuries  -Started on cardizem drip in ER  -Cardizem drip discontinued today and started on Lopressor 25 mg PO BID  -will keep in ICU for continued monitoring   -Echocardiogram on Monday 5/14/18  -Cardiology consult  -will start patient on aspirin 81 mg PO daily    -check VWF activity give possible history of Von Willebrand

## 2018-05-12 NOTE — CONSULTS
Impression:  1)    radiographic finding, hyperdensity evident in hypopharynx, incidentally detected  Discussion:  Consult received, actual images of CT scan reviewed, chart reviewed  By the chart patient is tolerating oral intake, and there is no report of dysphagia  If the finding represents a foreign body, the density is more consistent with a piece of broken tooth or bone rather than dental amalgam, and it is small enough that it should pass through the digestive tract  Alternatively it could represent an area of soft tissue calcification  Plan:  1) Lateral film of neck, assess for possible persistence of possible foreign body  If no radiographic abnormality remains, no follow up needed  If area of change persists and the patient has no complaint of dysphagia, he may follow up with the ENT service as an outpatient for fiberoptic evaluation

## 2018-05-12 NOTE — ASSESSMENT & PLAN NOTE
-Occurred today while at home   Was outdoors doing yard cleaning and passed out  -Reports being found by wife, not sure of duration he was on ground  -No prior hx of Syncope, seizures  -Probably secondary to new onset arrhythmia versus dehydration  -IV normal saline   -Neuro checks, orthostatic vital signs  -Continue management of A-fib with RVR  -AM labs  -Supportive care

## 2018-05-12 NOTE — ASSESSMENT & PLAN NOTE
-No prior hx of Syncope, seizures  -Probably secondary to new onset arrhythmia versus dehydration  -patient was noted to have an elevated creatinine of 1 07 which improved to 0 83 with IV fluids  Unsure of patient's baseline, CMP in 2016 showed a creatinine of  0 87   -continue IV normal saline   -Continue neuro checks  -Continue management of A-fib with RVR  -Cardiology consulted  -echocardiogram on Monday   -Continue telemetry monitoring

## 2018-05-12 NOTE — ASSESSMENT & PLAN NOTE
-Sustained during fall at home  -No associated facial bone fracture, or severe bleeding  -Head CT- No acute intracranial abnormality   -No visual disturbances  -Sutures applied in ER  -monitor for new onset bleeding, headaches  -Supportive care

## 2018-05-12 NOTE — PROGRESS NOTES
Progress Note - Glenna Fisher 1970, 53 y o  male MRN: [id number]    Unit/Bed#:  Encounter: [id number]    Primary Care Provider: Tia Houston MD   Date and time admitted to hospital: 5/11/2018  5:45 PM        Syncope and collapse   Assessment & Plan    -No prior hx of Syncope, seizures  -Probably secondary to new onset arrhythmia versus dehydration  -patient was noted to have an elevated creatinine of 1 07 which improved to 0 83 with IV fluids  Unsure of patient's baseline, CMP in 2016 showed a creatinine of  0 87   -continue IV normal saline   -Continue neuro checks  -Continue management of A-fib with RVR  -Cardiology consulted  -echocardiogram on Monday   -Continue telemetry monitoring  * Atrial fibrillation with RVR ([facility] [address] )   Assessment & Plan    -This is probably new onset as patient denies prior hx   -Discovered by EMS after syncope/fall with facial injuries  -Started on cardizem drip in ER  -Cardizem drip discontinued today and started on Lopressor 25 mg PO BID  -will keep in ICU for continued monitoring   -Echocardiogram on Monday 5/14/18  -Cardiology consult  -will start patient on aspirin 81 mg PO daily    -check VWF activity give possible history of Von Willebrand  Abnormal finding on CT scan   Assessment & Plan    -S/P syncope with collapse and facial injuries  -CT of cervical spine shows-Subcentimeter metallic density noted within the dependent portion of the hypopharynx at the level of the piriform sinuses  The findings may be related to an aspirated dental amalgam   ENT consultation is suggested   -No airway compromise, no difficulty swallowing  -CTA head and neck pending   -appreciate ENT's input  Leukocytosis   Assessment & Plan    -WBC at 12 18 on admission, now resolved  -No fever, chills or immediate source of infection  -continue to monitor  Hypokalemia   Assessment & Plan    -Potassium at 2 9 on admission  -resolved    -continue to monitor  Acute pain of left shoulder   Assessment & Plan    -pain secondary to fall  -No fracture noted on X-ray  -Pain management PRN        Facial laceration   Assessment & Plan    -Sustained during fall at home  -No associated facial bone fracture, or severe bleeding  -Head CT- No acute intracranial abnormality   -No visual disturbances  -Sutures applied in ER  -monitor for new onset bleeding, headaches  -Supportive care        Smoker   Assessment & Plan    -Smoking cessation counseling  -Nicotine patch            VTE Pharmacologic Prophylaxis:   Pharmacologic: Enoxaparin (Lovenox)  Mechanical VTE Prophylaxis in Place: Yes    Patient Centered Rounds: I have performed bedside rounds with nursing staff today  Discussions with Specialists or Other Care Team Provider: Nursing, [address] [place] [address] attending  Education and Discussions with Family / Patient: family updated at bedside  Time Spent for Care: 20 minutes  More than 50% of total time spent on counseling and coordination of care as described above  Current Length of Stay: 1 day(s)    Current Patient Status: Inpatient   Certification Statement: The patient will continue to require additional inpatient hospital stay due to continued workup and treatment of new onset afib  Code Status: Level 1 - Full Code      Subjective: The patient was seen and examined  The patient is complaining of pain on right side of face due to injury after syncopal episode  Objective:     Vitals:   Temp (24hrs), Av 7 °F (37 1 °C), Min:96 7 °F (35 9 °C), Max:99 8 °F (37 7 °C)    HR:  [] 74  Resp:  [15-23] 19  BP: ()/() 105/71  SpO2:  [96 %-100 %] 98 %  Body mass index is 21 86 kg/m²  Input and Output Summary (last 24 hours):        Intake/Output Summary (Last 24 hours) at 18 1424  Last data filed at 18 1406   Gross per 24 hour   Intake          5221 92 ml   Output             2000 ml   Net          3221 92 ml       Physical Exam: Physical Exam   Constitutional: He is oriented to person, place, and time  Vital signs are normal  He appears well-developed and well-nourished  He is active and cooperative  HENT:   Ecchymosis noted on right side of face with lacerations  Cardiovascular: Normal rate and normal heart sounds  An irregularly irregular rhythm present  Pulmonary/Chest: Effort normal and breath sounds normal  He has no wheezes  He has no rhonchi  He has no rales  Abdominal: Soft  Normal appearance and bowel sounds are normal  There is no tenderness  Neurological: He is alert and oriented to person, place, and time  No cranial nerve deficit  Skin: Skin is warm, dry and intact  Nursing note and vitals reviewed  Additional Data:     Labs:      Results from last 7 days  Lab Units 05/12/18  0508   WBC Thousand/uL 9 12   HEMOGLOBIN g/dL 13 5   HEMATOCRIT % 37 8   PLATELETS Thousands/uL 194   NEUTROS PCT % 69   LYMPHS PCT % 17   MONOS PCT % 14*   EOS PCT % 0       Results from last 7 days  Lab Units 05/12/18  0508   SODIUM mmol/L 141   POTASSIUM mmol/L 4 1   CHLORIDE mmol/L 108   CO2 mmol/L 24   BUN mg/dL 11   CREATININE mg/dL 0 83   CALCIUM mg/dL 8 1*   TOTAL PROTEIN g/dL 5 7*   BILIRUBIN TOTAL mg/dL 0 30   ALK PHOS U/L 75   ALT U/L 25   AST U/L 15   GLUCOSE RANDOM mg/dL 117       Results from last 7 days  Lab Units 05/12/18  0508   INR  1 10       Results from last 7 days  Lab Units 05/11/18  1805   POC GLUCOSE mg/dl 136       Results from last 7 days  Lab Units 05/12/18  0508   HEMOGLOBIN A1C % 5 5         * I Have Reviewed All Lab Data Listed Above  * Additional Pertinent Lab Tests Reviewed:  All Labs Within Last 24 Hours Reviewed    Imaging:    Imaging Reports Reviewed Today Include: none  Imaging Personally Reviewed by Myself Includes:  none    Recent Cultures (last 7 days):           Last 24 Hours Medication List:     Current Facility-Administered Medications:  acetaminophen 650 mg Oral Q6H PRN Pepe Gaytan PA-C aspirin 81 mg Oral Daily Odilon Mosher PA-C    enoxaparin 40 mg Subcutaneous Q24H [address] Odilon Mosher PA-C    iohexol 100 mL Intravenous Once in imaging Brandyn Forbes MD    metoprolol tartrate 25 mg Oral Q12H [address] Andre Ortega MD    morphine injection 1 mg Intravenous Q4H PRN Pepe Gaytan PA-C    nicotine 1 patch Transdermal Daily Pepe Gaytan PA-C    ondansetron 4 mg Intravenous Q6H PRN Pepe Gaytan PA-C    sodium chloride 125 mL/hr Intravenous Continuous Pepe Gaytan PA-C Last Rate: 125 mL/hr (05/12/18 0200)        Today, Patient Was Seen By: Edilma Hernandez PA-C    ** Please Note: Dictation voice to text software may have been used in the creation of this document   **

## 2018-05-12 NOTE — ASSESSMENT & PLAN NOTE
-S/P syncope with collapse and facial injuries  -CT of cervical spine shows-Subcentimeter metallic density noted within the dependent portion of the hypopharynx at the level of the piriform sinuses  The findings may be related to an aspirated dental amalgam   ENT consultation is suggested   -No airway compromise, no difficulty swallowing  -CTA head and neck pending   -appreciate ENT's input

## 2018-05-12 NOTE — ASSESSMENT & PLAN NOTE
-WBC at 12 18 on admission, now resolved  -No fever, chills or immediate source of infection  -continue to monitor

## 2018-05-12 NOTE — ED PROCEDURE NOTE
PROCEDURE  ECG 12 Lead Documentation  Date/Time: 5/11/2018 8:47 PM  Performed by: Clemente Espinal by: Kayley Mixon     Indications / Diagnosis:  Tachycardia  ECG reviewed by me, the ED Provider: yes    Patient location:  ED  Previous ECG:     Previous ECG:  Unavailable    Comparison to cardiac monitor: Yes    Interpretation:     Interpretation: abnormal    Rate:     ECG rate:  148    ECG rate assessment: tachycardic    Rhythm:     Rhythm: atrial fibrillation    Ectopy:     Ectopy: none    QRS:     QRS axis:  Normal    QRS intervals:  Normal  ST segments:     ST segments:  Normal  T waves:     T waves: normal           Asia Sow MD  05/11/18 204

## 2018-05-12 NOTE — ASSESSMENT & PLAN NOTE
-S/P syncope with collapse and facial injuries  -CT of cervical spine shows-Subcentimeter metallic density noted within the dependent portion of the hypopharynx at the level of the piriform sinuses   The findings may be related to an aspirated dental amalgam   ENT consultation is suggested   -No airway compromise, no difficulty swallowing  -Consider repeat imaging study  -ENT consult for further recomendations

## 2018-05-12 NOTE — OCCUPATIONAL THERAPY NOTE
Occupational Therapy Evaluation     Patient Name: Lisa Evangelista's Date: 5/12/2018  Problem List  Patient Active Problem List   Diagnosis    Atrial fibrillation with RVR ([address] )    Fall at home, initial encounter    Syncope and collapse    Smoker    Acute pain of left shoulder    Facial laceration    Hypokalemia    Leukocytosis    Abnormal finding on CT scan     Past Medical History  Past Medical History:   Diagnosis Date    H/O von Willebrand's disease     Migraines     Wrist fracture     left     Past Surgical History  Past Surgical History:   Procedure Laterality Date    WISDOM TOOTH EXTRACTION               05/12/18 0756   Note Type   Note type Eval only   Restrictions/Precautions   Weight Bearing Precautions Per Order No   Other Precautions Multiple lines;Telemetry; Fall Risk;Pain   Pain Assessment   Pain Assessment 0-10   Pain Score 4   Pain Location Shoulder   Home Living   Type of Home House   Home Layout Two level;Stairs to enter with rails;Bed/bath upstairs  (13 or 5 ANNETTE c HR; FF to 2nd c HR )   Bathroom Shower/Tub Tub/shower unit   Bathroom Toilet Standard   Bathroom Equipment (no DME)   Bathroom Accessibility Accessible   Prior Function   Level of [place] Independent with ADLs and functional mobility   Lives With Spouse   ADL Assistance Independent   IADLs Independent   Falls in the last 6 months 1 to 4   Vocational 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   Psychosocial   Psychosocial (WDL) WDL   ADL   Where Assessed Edge of bed   LB Dressing Assistance 7  Independent   Bed Mobility   Supine to Sit 7  Independent   Sit to Supine 7  Independent   Transfers   Sit to Stand 7  Independent   Stand to Sit 7  Independent   Functional Mobility   Functional Mobility 7  Independent   Additional Comments pt performed FM in hallway ~200ft with no difficulties; HR ranged 70-89 throughout session; pt with no dizziness, no LOB, and demonstrated good balance and stability Balance   Static Sitting Good   Dynamic Sitting Good   Static Standing Good   Dynamic Standing Good   Ambulatory Good   Activity Tolerance   Activity Tolerance Patient tolerated treatment well   RUE Assessment   RUE Assessment WFL   LUE Assessment   LUE Assessment X  (4/5 at should  pain with movement)   Hand Function   Gross Motor Coordination Functional   Fine Motor Coordination Functional   Sensation   Light Touch No apparent deficits   Sharp/Dull No apparent deficits   Vision-Basic Assessment   Current Vision No visual deficits   Perception   Inattention/Neglect Appears intact   Cognition   Overall Cognitive Status WFL   Arousal/Participation Alert   Attention Within functional limits   Orientation Level Oriented X4   Memory Within functional limits   Following Commands Follows all commands and directions without difficulty   Assessment   Assessment pt with no functional limitations requiring skilled OT intervention at this time    Recommendation   OT Discharge Recommendation Home independent   OT - OK to Discharge Yes   Barthel Index   Feeding 10   Bathing 5   Grooming Score 5   Dressing Score 10   Bladder Score 10   Bowels Score 10   Toilet Use Score 10   Transfers (Bed/Chair) Score 15   Mobility (Level Surface) Score 15   Stairs Score 0   Barthel Index Score 90       Pt left seated in chair at end of session; all needs within reach; all lines intact; scds connected and turned on  Pt is performing at [facility]; OT services will be discontinued at this time

## 2018-05-12 NOTE — PLAN OF CARE
CARDIOVASCULAR - ADULT     Maintains optimal cardiac output and hemodynamic stability Progressing     Absence of cardiac dysrhythmias or at baseline rhythm Progressing        DISCHARGE PLANNING     Discharge to home or other facility with appropriate resources Progressing        Knowledge Deficit     Patient/family/caregiver demonstrates understanding of disease process, treatment plan, medications, and discharge instructions Progressing        NEUROSENSORY - ADULT     Achieves stable or improved neurological status Progressing        PAIN - ADULT     Verbalizes/displays adequate comfort level or baseline comfort level Progressing        Potential for Falls     Patient will remain free of falls Progressing        SAFETY ADULT     Patient will remain free of falls Progressing     Maintain or return to baseline ADL function Progressing     Maintain or return mobility status to optimal level Progressing

## 2018-05-13 ENCOUNTER — APPOINTMENT (INPATIENT)
Dept: RADIOLOGY | Facility: HOSPITAL | Age: 48
DRG: 309 | End: 2018-05-13
Payer: COMMERCIAL

## 2018-05-13 ENCOUNTER — APPOINTMENT (INPATIENT)
Dept: CT IMAGING | Facility: HOSPITAL | Age: 48
DRG: 309 | End: 2018-05-13
Payer: COMMERCIAL

## 2018-05-13 PROBLEM — T17.208A FOREIGN BODY IN PHARYNX: Status: ACTIVE | Noted: 2018-05-11

## 2018-05-13 LAB
ANION GAP SERPL CALCULATED.3IONS-SCNC: 7 MMOL/L (ref 4–13)
BASOPHILS # BLD AUTO: 0.03 THOUSANDS/ΜL (ref 0–0.1)
BASOPHILS NFR BLD AUTO: 0 % (ref 0–1)
BILIRUB UR QL STRIP: NEGATIVE
BUN SERPL-MCNC: 10 MG/DL (ref 5–25)
CALCIUM SERPL-MCNC: 7.6 MG/DL (ref 8.3–10.1)
CHLORIDE SERPL-SCNC: 108 MMOL/L (ref 100–108)
CLARITY UR: CLEAR
CO2 SERPL-SCNC: 25 MMOL/L (ref 21–32)
COLOR UR: NORMAL
CREAT SERPL-MCNC: 0.78 MG/DL (ref 0.6–1.3)
EOSINOPHIL # BLD AUTO: 0.05 THOUSAND/ΜL (ref 0–0.61)
EOSINOPHIL NFR BLD AUTO: 1 % (ref 0–6)
ERYTHROCYTE [DISTWIDTH] IN BLOOD BY AUTOMATED COUNT: 12.1 % (ref 11.6–15.1)
GFR SERPL CREATININE-BSD FRML MDRD: 107 ML/MIN/1.73SQ M
GLUCOSE SERPL-MCNC: 94 MG/DL (ref 65–140)
GLUCOSE UR STRIP-MCNC: NEGATIVE MG/DL
HCT VFR BLD AUTO: 37.8 % (ref 36.5–49.3)
HGB BLD-MCNC: 13.4 G/DL (ref 12–17)
HGB UR QL STRIP.AUTO: NEGATIVE
KETONES UR STRIP-MCNC: NEGATIVE MG/DL
LEUKOCYTE ESTERASE UR QL STRIP: NEGATIVE
LYMPHOCYTES # BLD AUTO: 1.92 THOUSANDS/ΜL (ref 0.6–4.47)
LYMPHOCYTES NFR BLD AUTO: 27 % (ref 14–44)
MCH RBC QN AUTO: 31.3 PG (ref 26.8–34.3)
MCHC RBC AUTO-ENTMCNC: 35.4 G/DL (ref 31.4–37.4)
MCV RBC AUTO: 88 FL (ref 82–98)
MONOCYTES # BLD AUTO: 0.87 THOUSAND/ΜL (ref 0.17–1.22)
MONOCYTES NFR BLD AUTO: 12 % (ref 4–12)
NEUTROPHILS # BLD AUTO: 4.3 THOUSANDS/ΜL (ref 1.85–7.62)
NEUTS SEG NFR BLD AUTO: 60 % (ref 43–75)
NITRITE UR QL STRIP: NEGATIVE
PH UR STRIP.AUTO: 5 [PH] (ref 4.5–8)
PLATELET # BLD AUTO: 176 THOUSANDS/UL (ref 149–390)
PMV BLD AUTO: 9.6 FL (ref 8.9–12.7)
POTASSIUM SERPL-SCNC: 4.4 MMOL/L (ref 3.5–5.3)
PROT UR STRIP-MCNC: NEGATIVE MG/DL
RBC # BLD AUTO: 4.28 MILLION/UL (ref 3.88–5.62)
SODIUM SERPL-SCNC: 140 MMOL/L (ref 136–145)
SP GR UR STRIP.AUTO: <=1.005 (ref 1–1.03)
UROBILINOGEN UR QL STRIP.AUTO: 0.2 E.U./DL
WBC # BLD AUTO: 7.17 THOUSAND/UL (ref 4.31–10.16)

## 2018-05-13 PROCEDURE — 70498 CT ANGIOGRAPHY NECK: CPT

## 2018-05-13 PROCEDURE — 85025 COMPLETE CBC W/AUTO DIFF WBC: CPT | Performed by: PHYSICIAN ASSISTANT

## 2018-05-13 PROCEDURE — 99232 SBSQ HOSP IP/OBS MODERATE 35: CPT | Performed by: FAMILY MEDICINE

## 2018-05-13 PROCEDURE — 70496 CT ANGIOGRAPHY HEAD: CPT

## 2018-05-13 PROCEDURE — 70360 X-RAY EXAM OF NECK: CPT

## 2018-05-13 PROCEDURE — 80048 BASIC METABOLIC PNL TOTAL CA: CPT | Performed by: PHYSICIAN ASSISTANT

## 2018-05-13 PROCEDURE — 81003 URINALYSIS AUTO W/O SCOPE: CPT | Performed by: FAMILY MEDICINE

## 2018-05-13 RX ORDER — OXYCODONE HYDROCHLORIDE 5 MG/1
5 TABLET ORAL EVERY 4 HOURS PRN
Status: DISCONTINUED | OUTPATIENT
Start: 2018-05-13 | End: 2018-05-15 | Stop reason: HOSPADM

## 2018-05-13 RX ADMIN — IOHEXOL 100 ML: 350 INJECTION, SOLUTION INTRAVENOUS at 08:00

## 2018-05-13 RX ADMIN — OXYCODONE HYDROCHLORIDE 5 MG: 5 TABLET ORAL at 15:56

## 2018-05-13 RX ADMIN — METOPROLOL TARTRATE 25 MG: 25 TABLET ORAL at 09:42

## 2018-05-13 RX ADMIN — ASPIRIN 81 MG 81 MG: 81 TABLET ORAL at 09:44

## 2018-05-13 RX ADMIN — ENOXAPARIN SODIUM 40 MG: 40 INJECTION SUBCUTANEOUS at 14:45

## 2018-05-13 RX ADMIN — SODIUM CHLORIDE 125 ML/HR: 0.9 INJECTION, SOLUTION INTRAVENOUS at 06:11

## 2018-05-13 RX ADMIN — OXYCODONE HYDROCHLORIDE 5 MG: 5 TABLET ORAL at 11:56

## 2018-05-13 RX ADMIN — METOPROLOL TARTRATE 25 MG: 25 TABLET ORAL at 20:56

## 2018-05-13 NOTE — PROGRESS NOTES
Progress Note - Christine Nunez 1970, 53 y o  male MRN: [id number]    Unit/Bed#:  Encounter: [id number]    Primary Care Provider: Hilario Garrison MD   Date and time admitted to hospital: 5/11/2018  5:45 PM        * Atrial fibrillation with RVR ([address] )   Assessment & Plan    Rate controlled on metoprolol 25mg PO BID  Aspirin for AC (CHADS 2 = 0)   ECHO tomorrow  Cardiology eval         Foreign body in pharynx   Assessment & Plan    ENT Input appreciated  Patient asymptomatic  Obtain Xray of neck soft tissue         Acute pain of left shoulder   Assessment & Plan    Resolved         Syncope and collapse   Assessment & Plan    Likely 2/2 cardiac arrhythmia  Follow up echo and cardiology consult tomorrow  CTA head/neck reviewed            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  He also states he's "heavy bleeder"  VWB activity pending     Progress Note - Christine Nunez 53 y o  male MRN: [id number]    Unit/Bed#:  Encounter: [id number]      1  Subjective:   Seen and examined @ bedside  Feels better today no c/p dizziness / sore throat     Objective:     Vitals:   Vitals:    05/13/18 0728   BP: 122/73   Pulse: 90   Resp: 22   Temp: 100 1 °F (37 8 °C)   SpO2: 98%     Body mass index is 22 85 kg/m²      Intake/Output Summary (Last 24 hours) at 05/13/18 4741  Last data filed at 05/13/18 0902   Gross per 24 hour   Intake          3375 41 ml   Output             1775 ml   Net          1600 41 ml       Physical Exam:   /73 (BP Location: Left arm)   Pulse 90   Temp 100 1 °F (37 8 °C) (Temporal)   Resp 22   Ht 5' 11" (1 803 m)   Wt 74 3 kg (163 lb 12 8 oz)   SpO2 98%   BMI 22 85 kg/m²   General appearance: alert and oriented, in no acute distress  Head: Normocephalic, without obvious abnormality, atraumatic  Lungs: clear to auscultation bilaterally  Heart: regularly irregular rhythm  Abdomen: soft, non-tender; bowel sounds normal; no masses,  no organomegaly  Extremities: extremities normal, warm and well-perfused; no cyanosis, clubbing, or edema  Pulses: 2+ and symmetric  Neurologic: Grossly normal     Invasive Devices     Peripheral Intravenous Line            Peripheral IV 05/11/18 Right Antecubital 1 day                  Results from last 7 days  Lab Units 05/13/18  0530 05/12/18  0508 05/11/18  1759   WBC Thousand/uL 7 17 9 12 12 18*   HEMOGLOBIN g/dL 13 4 13 5 15 0   HEMATOCRIT % 37 8 37 8 40 6   PLATELETS Thousands/uL 176 194 302         Results from last 7 days  Lab Units 05/13/18  0530 05/12/18  0508 05/11/18  1759   SODIUM mmol/L 140 141 139   POTASSIUM mmol/L 4 4 4 1 2 9*   CHLORIDE mmol/L 108 108 102   CO2 mmol/L 25 24 23   BUN mg/dL 10 11 12   CREATININE mg/dL 0 78 0 83 1 07   CALCIUM mg/dL 7 6* 8 1* 8 7   TOTAL PROTEIN g/dL  --  5 7*  --    BILIRUBIN TOTAL mg/dL  --  0 30  --    ALK PHOS U/L  --  75  --    ALT U/L  --  25  --    AST U/L  --  15  --    GLUCOSE RANDOM mg/dL 94 117 182*       Medication Administration - last 24 hours from 05/12/2018 0922 to 05/13/2018 3586       Date/Time Order Dose Route Action Action by     05/13/2018 0611 sodium chloride 0 9 % infusion 125 mL/hr Intravenous [address]     05/12/2018 2230 sodium chloride 0 9 % infusion 125 mL/hr Intravenous [address]     05/12/2018 1452 sodium chloride 0 9 % infusion 125 mL/hr Intravenous New Bag Abbey Mayers RN     05/12/2018 1532 acetaminophen (TYLENOL) tablet 650 mg 650 mg Oral Given Abbey Mayers RN     05/12/2018 1030 lidocaine (LIDODERM) 5 % patch 1 patch 1 patch Transdermal Patch Removed Abbey Mayers RN     05/12/2018 1245 diltiazem (CARDIZEM) 125 mg in sodium chloride 0 9 % 125 mL infusion 0 mg/hr Intravenous Stopped Abbey Mayers RN     05/12/2018 2108 metoprolol tartrate (LOPRESSOR) tablet 25 mg 25 mg Oral Given Anjana Peters RN     05/12/2018 1158 metoprolol tartrate (LOPRESSOR) tablet 25 mg 25 mg Oral Given Yajaira Campbell RN     05/13/2018 0800 iohexol (OMNIPAQUE) 350 MG/ML injection (MULTI-DOSE) 100 mL 100 mL Intravenous Given Lele Norrisfret     05/12/2018 1411 aspirin chewable tablet 81 mg 81 mg Oral Given Yajaira Campbell RN     05/12/2018 1411 enoxaparin (LOVENOX) subcutaneous injection 40 mg 40 mg Subcutaneous Given Yajaira Campbell RN     05/12/2018 1936 oxyCODONE (ROXICODONE) IR tablet 5 mg 5 mg Oral Given Dania Rene RN            Lab, Imaging and other studies: I have personally reviewed pertinent reports      VTE Pharmacologic Prophylaxis: Enoxaparin (Lovenox)  VTE Mechanical Prophylaxis: sequential compression device     Roman Lyon MD  5/13/2018,9:22 AM

## 2018-05-13 NOTE — ASSESSMENT & PLAN NOTE
Likely 2/2 cardiac arrhythmia  Follow up echo and cardiology consult tomorrow  CTA head/neck reviewed

## 2018-05-13 NOTE — ASSESSMENT & PLAN NOTE
Rate controlled on metoprolol 25mg PO BID  Aspirin for AC (CHADS 2 = 0)   ECHO tomorrow  Cardiology eval

## 2018-05-13 NOTE — CASE MANAGEMENT
Initial Clinical Review    Admission: Date/Time/Statement: 5/11/18 @ [address] [place] This Encounter   Procedures    Inpatient Admission (expected length of stay for this patient is greater than two midnights)     Standing Status:   Standing     Number of Occurrences:   1     Order Specific Question:   Admitting Physician     Answer:   Puneet Stein     Order Specific Question:   Level of Care     Answer:   Critical Care [15]     Order Specific Question:   Estimated length of stay     Answer:   More than 2 Midnights     Order Specific Question:   Certification     Answer:   I certify that inpatient services are medically necessary for this patient for a duration of greater than two midnights  See H&P and MD Progress Notes for additional information about the patient's course of treatment  ED: Date/Time/Mode of Arrival:   ED Arrival Information     Expected Arrival Acuity Means of Arrival Escorted By Service Admission Type    - 5/11/2018 17:45 [facility]    Arrival Complaint    fall        Chief Complaint:   Chief Complaint   Patient presents with    Fall     c/o fall with facial laceration  +LOC according to EMS  Pt alert and oriented appropriately at this time  History of Illness: Shai Winchester is a 53 y o  male who presents to the emergency room brought in by EMS status post syncope while at home sustaining facial lacerations  Patient reports being at home doing all the cleaning then remembered waking up in his yard bleeding from his face  His wife states that she heard a thump, upon checking a few minutes later she found him lying on the ground  He was found to be in rapid AFib by EMS  Patient has been admitted to CCU for further workup and management of rapid AFib with RVR possibly new onset, syncope, hypokalemia       ED Vital Signs:   ED Triage Vitals   Temperature Pulse Respirations Blood Pressure SpO2   05/11/18 60 443 74 88 05/11/18 1749 05/11/18 1749 05/11/18 1749 05/11/18 1749   (!) 96 7 °F (35 9 °C) (!) 158 22 116/77 100 %      Temp Source Heart Rate Source Patient Position - Orthostatic VS BP Location FiO2 (%)   05/11/18 1901 05/11/18 1749 05/11/18 1804 05/11/18 1749 --   Temporal Monitor Lying Right arm       Pain Score       05/11/18 1749       No Pain        Wt Readings from Last 1 Encounters:   05/13/18 74 3 kg (163 lb 12 8 oz)       Vital Signs (abnormal):     Date/Time  Temp  Pulse  Resp  BP   SpO2     05/13/18 0728  100 1 °F (37 8 °C)  90  22  122/73   98 %     05/12/18 2015  --  95  18  104/77   97 %     05/12/18 1200  98 6 °F (37 °C)  81  21  103/77   97 %     05/12/18 0800  99 8 °F (37 7 °C)  79  16  107/62   96 %     05/12/18 0700  --  80  16  95/59   97 %     05/12/18 0630  --  82  15  97/67   97 %     05/12/18 0600  --  83  17  94/65   96 %     05/12/18 0530  --  82  18  95/68   97 %     05/12/18 0100  --  100  15  109/58   96 %     05/12/18 0030  --  103  17  109/65   96 %     05/12/18 0000  --   106  22  106/68   97 %     05/11/18 2330  --  100  15  95/62   96 %     05/11/18 2300  99 3 °F (37 4 °C)   110  18  107/58   97 %     05/11/18 2149  --  101  21  129/93   99 %     05/11/18 2049  --   107  22  126/86   98 %     05/11/18 1949  --   126  22  127/84   98 %     05/11/18 1919  --   126  22  116/78   98 %     05/11/18 1901  99 °F (37 2 °C)  --  --  --   --     05/11/18 1849  --   133  19  120/80   99 %     05/11/18 18:35:46  --   141  21  129/81   99 %     05/11/18 18:34:26  --   135  16   69/31   99 %     05/11/18 1819  --   158  20   145/109   100 %     05/11/18 1804  --   160   23  120/67   100 %         Abnormal Labs/Diagnostic Test Results:    WBC 12 18    K 2 9    Glucose 182    EKG:  Atrial fibrillation with RVR rate 148       CTA chest, Abdomen  1  No evidence of aneurysm or dissection  2   Clear lungs  3   Mild hepatic steatosis and hepatomegaly     4   Superior indentation on the celiac axis best seen on the sagittal view 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 to median arcuate ligament syndrome  CXR portable:Questionable superior mediastinal widening which could be from brachiocephalic vessels/positioning, follow-up with repeat PA and lateral chest x-rays  CXR PA, lateral  Persistent right paratracheal density suspicious for brachiocephalic vessels or adenopathy  Vascular injury considered less likely     C spine CT  No cervical spine fracture or traumatic malalignment  Subcentimeter metallic density noted within the dependent portion of the hypopharynx at the level of the piriform sinuses  The findings may be related to an aspirated dental amalgam   ENT consultation is suggested  ED Treatment:   Medication Administration from 05/11/2018 1744 to 05/11/2018 2244       Date/Time Order Dose Route Action Comments     05/11/2018 1819 diltiazem (CARDIZEM) injection 15 mg 15 mg Intravenous Given      05/11/2018 1915 diltiazem (CARDIZEM) 125 mg in sodium chloride 0 9 % 125 mL infusion 5 mg/hr Intravenous New Bag      05/11/2018 1954 potassium chloride 20 mEq IVPB (premix) 20 mEq Intravenous New Bag      05/11/2018 1952 fentanyl citrate (PF) 100 MCG/2ML 25 mcg 25 mcg Intravenous Given      05/11/2018 1755 sodium chloride 0 9 % bolus 1,000 mL 1,000 mL Intravenous New Bag      05/11/2018 2002 sodium chloride 0 9 % bolus 1,000 mL 1,000 mL Intravenous New Bag      05/11/2018 1755 sodium chloride 0 9 % bolus 1,000 mL 1,000 mL Intravenous New Bag      05/11/2018 2235 lidocaine (LIDODERM) 5 % patch 1 patch 1 patch Transdermal Medication Applied Left shoulder  05/11/2018 2155 iohexol (OMNIPAQUE) 350 MG/ML injection (MULTI-DOSE) 100 mL 100 mL Intravenous Given           Past Medical/Surgical History:    Active Ambulatory Problems     Diagnosis Date Noted    Fall at home, initial encounter 05/11/2018     Resolved Ambulatory Problems     Diagnosis Date Noted    No Resolved Ambulatory Problems     Past Medical History: Diagnosis Date    H/O von Willebrand's disease     Migraines     Wrist fracture        Admitting Diagnosis: Head injury [S09 90XA]  Atrial fibrillation with rapid ventricular response ([address] ) [I48 91]  Concussion with loss of consciousness, initial encounter [S06 0X9A]    Age/Sex: 53 y o  male    Assessment/Plan:   Syncope and collapse   Assessment & Plan     -Occurred today while at home   Was outdoors doing yard cleaning and passed out  -Reports being found by wife, not sure of duration he was on ground  -No prior hx of Syncope, seizures  -Probably secondary to new onset arrhythmia versus dehydration  -IV normal saline   -Neuro checks, orthostatic vital signs  -Continue management of A-fib with RVR  -AM labs            * Atrial fibrillation with RVR ([address] )   Assessment & Plan     -This is probably new onset as patient denies prior hx   -Discovered by EMS after syncope/fall with facial injuries  -Started on cardizem drip in ER  -Admit to ICU  -Telemetry monitoring  -Continue Cardizem Drip  -IV normal saline  -AM labs  -ECHO  -Cardiology consult          Hypokalemia   Assessment & Plan     -Potassium at 2 9  -Replace potassium  -AM CMP          Facial laceration   Assessment & Plan     -Sustained during fall at home  -No associated facial bone fracture, or severe bleeding  -Head CT- No acute intracranial abnormality   -No visual disturbances  -Sutures applied in ER  -monitor for new onset bleeding, headaches         Leukocytosis   Assessment & Plan     -WBC at 12 18  -No fever, chills or immediate source of infection  -IV normal saline  -AM CBC          Acute pain of left shoulder   Assessment & Plan     -pain secondary to fall  -No fracture noted on X-ray  -Pain management PRN          Smoker   Assessment & Plan     -Smoking cessation counseling  -Nicotine patch          Abnormal finding on CT scan   Assessment & Plan     -S/P syncope with collapse and facial injuries  -CT of cervical spine shows-Subcentimeter metallic density noted within the dependent portion of the hypopharynx at the level of the piriform sinuses  The findings may be related to an aspirated dental amalgam   ENT consultation is suggested   -No airway compromise, no difficulty swallowing  -Consider repeat imaging study  -ENT consult for further recomendations                  VTE Prophylaxis: Low risk after VTE screen  / reason for no mechanical VTE prophylaxis low risk after VTE screen      Anticipated Length of Stay:  Patient will be admitted on an Inpatient basis with an anticipated length of stay of  > 2 midnights     Justification for Hospital Stay:  Atrial fibrillation with RVR, syncope with collapse, hypokalemia          Admission Orders:  Scheduled Meds:   Current Facility-Administered Medications:  acetaminophen 650 mg Oral Q6H PRN Pepe Gaytan PA-C   aspirin 81 mg Oral Daily Odilon Mosher PA-C   enoxaparin 40 mg Subcutaneous Q24H [address] Odilon Mosher PA-C   metoprolol tartrate 25 mg Oral Q12H [address] Andre Ortega MD   morphine injection 1 mg Intravenous Q4H PRN Pepe Gaytan PA-C   nicotine 1 patch Transdermal Daily Pepe Gaytan PA-C   ondansetron 4 mg Intravenous Q6H PRN Pepe Gaytan PA-C   oxyCODONE 5 mg Oral Q4H PRN Andre Ortega MD     Continuous Infusions:    PRN Meds:   acetaminophen    morphine injection    ondansetron    oxyCODONE    Admit to ICU continuous cardiac monitoring  Cardiac diet  ECHO  Consult cardiology  Neuro checks q 4h  Consult ENT

## 2018-05-13 NOTE — PROGRESS NOTES
Impression:  1)  Possible foreign body, pharynx, at time of initial imaging, now resolved  Discussion:  I reviewed the lateral neck film, also the CT-A obtained today, and the hyperdensity present in the pharynx has resolved  Plan:  No ENT follow up needed

## 2018-05-14 ENCOUNTER — APPOINTMENT (INPATIENT)
Dept: NON INVASIVE DIAGNOSTICS | Facility: HOSPITAL | Age: 48
DRG: 309 | End: 2018-05-14
Payer: COMMERCIAL

## 2018-05-14 ENCOUNTER — ANESTHESIA EVENT (INPATIENT)
Dept: PERIOP | Facility: HOSPITAL | Age: 48
DRG: 309 | End: 2018-05-14
Payer: COMMERCIAL

## 2018-05-14 LAB
ATRIAL RATE: 138 BPM
ATRIAL RATE: 96 BPM
BASOPHILS # BLD AUTO: 0.02 THOUSANDS/ΜL (ref 0–0.1)
BASOPHILS NFR BLD AUTO: 0 % (ref 0–1)
EOSINOPHIL # BLD AUTO: 0.13 THOUSAND/ΜL (ref 0–0.61)
EOSINOPHIL NFR BLD AUTO: 2 % (ref 0–6)
ERYTHROCYTE [DISTWIDTH] IN BLOOD BY AUTOMATED COUNT: 11.7 % (ref 11.6–15.1)
HCT VFR BLD AUTO: 40.8 % (ref 36.5–49.3)
HGB BLD-MCNC: 14.6 G/DL (ref 12–17)
LYMPHOCYTES # BLD AUTO: 2.04 THOUSANDS/ΜL (ref 0.6–4.47)
LYMPHOCYTES NFR BLD AUTO: 25 % (ref 14–44)
MCH RBC QN AUTO: 30.8 PG (ref 26.8–34.3)
MCHC RBC AUTO-ENTMCNC: 35.8 G/DL (ref 31.4–37.4)
MCV RBC AUTO: 86 FL (ref 82–98)
MONOCYTES # BLD AUTO: 1.01 THOUSAND/ΜL (ref 0.17–1.22)
MONOCYTES NFR BLD AUTO: 12 % (ref 4–12)
NEUTROPHILS # BLD AUTO: 4.95 THOUSANDS/ΜL (ref 1.85–7.62)
NEUTS SEG NFR BLD AUTO: 61 % (ref 43–75)
PLATELET # BLD AUTO: 211 THOUSANDS/UL (ref 149–390)
PMV BLD AUTO: 9.6 FL (ref 8.9–12.7)
QRS AXIS: 42 DEGREES
QRS AXIS: 55 DEGREES
QRSD INTERVAL: 104 MS
QRSD INTERVAL: 98 MS
QT INTERVAL: 298 MS
QT INTERVAL: 356 MS
QTC INTERVAL: 440 MS
QTC INTERVAL: 467 MS
RBC # BLD AUTO: 4.74 MILLION/UL (ref 3.88–5.62)
T WAVE AXIS: 49 DEGREES
T WAVE AXIS: 59 DEGREES
TSH SERPL DL<=0.05 MIU/L-ACNC: 2.8 UIU/ML (ref 0.36–3.74)
VENTRICULAR RATE: 148 BPM
VENTRICULAR RATE: 92 BPM
WBC # BLD AUTO: 8.15 THOUSAND/UL (ref 4.31–10.16)

## 2018-05-14 PROCEDURE — 93306 TTE W/DOPPLER COMPLETE: CPT

## 2018-05-14 PROCEDURE — 99232 SBSQ HOSP IP/OBS MODERATE 35: CPT | Performed by: PHYSICIAN ASSISTANT

## 2018-05-14 PROCEDURE — 85246 CLOT FACTOR VIII VW ANTIGEN: CPT | Performed by: INTERNAL MEDICINE

## 2018-05-14 PROCEDURE — 85025 COMPLETE CBC W/AUTO DIFF WBC: CPT | Performed by: FAMILY MEDICINE

## 2018-05-14 PROCEDURE — 84443 ASSAY THYROID STIM HORMONE: CPT | Performed by: INTERNAL MEDICINE

## 2018-05-14 PROCEDURE — 85240 CLOT FACTOR VIII AHG 1 STAGE: CPT | Performed by: INTERNAL MEDICINE

## 2018-05-14 PROCEDURE — 93306 TTE W/DOPPLER COMPLETE: CPT | Performed by: INTERNAL MEDICINE

## 2018-05-14 PROCEDURE — 99253 IP/OBS CNSLTJ NEW/EST LOW 45: CPT | Performed by: INTERNAL MEDICINE

## 2018-05-14 PROCEDURE — 93010 ELECTROCARDIOGRAM REPORT: CPT | Performed by: INTERNAL MEDICINE

## 2018-05-14 PROCEDURE — 99254 IP/OBS CNSLTJ NEW/EST MOD 60: CPT | Performed by: INTERNAL MEDICINE

## 2018-05-14 RX ORDER — SODIUM CHLORIDE, SODIUM LACTATE, POTASSIUM CHLORIDE, CALCIUM CHLORIDE 600; 310; 30; 20 MG/100ML; MG/100ML; MG/100ML; MG/100ML
125 INJECTION, SOLUTION INTRAVENOUS CONTINUOUS
Status: DISCONTINUED | OUTPATIENT
Start: 2018-05-14 | End: 2018-05-15 | Stop reason: HOSPADM

## 2018-05-14 RX ADMIN — APIXABAN 5 MG: 5 TABLET, FILM COATED ORAL at 21:07

## 2018-05-14 RX ADMIN — SODIUM CHLORIDE, SODIUM LACTATE, POTASSIUM CHLORIDE, AND CALCIUM CHLORIDE 125 ML/HR: .6; .31; .03; .02 INJECTION, SOLUTION INTRAVENOUS at 23:30

## 2018-05-14 RX ADMIN — ASPIRIN 81 MG 81 MG: 81 TABLET ORAL at 08:40

## 2018-05-14 RX ADMIN — METOPROLOL TARTRATE 25 MG: 25 TABLET ORAL at 08:39

## 2018-05-14 RX ADMIN — APIXABAN 5 MG: 5 TABLET, FILM COATED ORAL at 12:03

## 2018-05-14 RX ADMIN — METOPROLOL TARTRATE 25 MG: 25 TABLET ORAL at 21:07

## 2018-05-14 RX ADMIN — OXYCODONE HYDROCHLORIDE 5 MG: 5 TABLET ORAL at 07:07

## 2018-05-14 NOTE — ASSESSMENT & PLAN NOTE
ENT Input appreciated  Patient asymptomatic  Xray of neck soft tissue is negative for acute findings

## 2018-05-14 NOTE — PROGRESS NOTES
Progress Note - Haylee Mendez 1970, 53 y o  male MRN: [id number]    Unit/Bed#:  Encounter: [id number]    Primary Care Provider: Ann Sherwood MD   Date and time admitted to hospital: 5/11/2018  5:45 PM        Syncope and collapse   Assessment & Plan    Likely 2/2 cardiac arrhythmia  Cardiology consulted and felt the patient may have had a post-conversion pause and recommend an implantation of a loop recorder as an outpatient  CTA head/neck reviewed        * Atrial fibrillation with RVR (HCC)   Assessment & Plan    Rate controlled on metoprolol 25mg PO BID  Cardiology consulted and recommended AGNIESZKA-guided cardioversion which is scheduled for tomorrow 5/15/18 at 9 am   She also recommended starting eliquis x 1 month then resuming aspirin  Will consult Hematology given patient stating he may have Alee Reap given family history  VWF activity pending  Foreign body in pharynx   Assessment & Plan    ENT Input appreciated  Patient asymptomatic  Xray of neck soft tissue is negative for acute findings  Leukocytosis   Assessment & Plan    -WBC at 12 18 on admission, now resolved  -No fever, chills or immediate source of infection  -continue to monitor  Hypokalemia   Assessment & Plan    -Potassium at 2 9 on admission and is noted to be 3 0 this am    -Replace potassium  -repeat labs in am         Acute pain of left shoulder   Assessment & Plan    Resolved             VTE Pharmacologic Prophylaxis:   Pharmacologic: Apixaban (Eliquis)  Mechanical VTE Prophylaxis in Place: Yes    Patient Centered Rounds: I have performed bedside rounds with nursing staff today  Discussions with Specialists or Other Care Team Provider: Nursing, CM, and attending    Education and Discussions with Family / Patient: n/a    Time Spent for Care: 20 minutes  More than 50% of total time spent on counseling and coordination of care as described above      Current Length of Stay: 3 day(s)    Current Patient Status: Inpatient   Certification Statement: The patient will continue to require additional inpatient hospital stay due to continued monitoring and managment of atrial fibrillation  Code Status: Level 1 - Full Code      Subjective: The patient was seen and examined  The patient states he continues to have jaw pain otherwise he is feeling well  Objective:     Vitals:   Temp (24hrs), Av 3 °F (36 8 °C), Min:97 5 °F (36 4 °C), Max:99 2 °F (37 3 °C)    HR:  [] 91  Resp:  [11-39] 16  BP: (102-139)/(66-98) 113/66  SpO2:  [97 %-99 %] 98 %  Body mass index is 21 74 kg/m²  Input and Output Summary (last 24 hours): Intake/Output Summary (Last 24 hours) at 18 1441  Last data filed at 18 1321   Gross per 24 hour   Intake             1160 ml   Output             3950 ml   Net            -2790 ml       Physical Exam:     Physical Exam   Constitutional: He is oriented to person, place, and time  Vital signs are normal  He appears well-developed and well-nourished  He is active and cooperative  HENT:   Ecchymosis noted on right side of face  Cardiovascular: Normal rate and normal heart sounds  An irregularly irregular rhythm present  Pulmonary/Chest: Effort normal and breath sounds normal  He has no wheezes  He has no rhonchi  He has no rales  Abdominal: Soft  Normal appearance and bowel sounds are normal  There is no tenderness  Neurological: He is alert and oriented to person, place, and time  No cranial nerve deficit  Skin: Skin is warm, dry and intact  Nursing note and vitals reviewed          Additional Data:     Labs:      Results from last 7 days  Lab Units 18  0519   WBC Thousand/uL 8 15   HEMOGLOBIN g/dL 14 6   HEMATOCRIT % 40 8   PLATELETS Thousands/uL 211   NEUTROS PCT % 61   LYMPHS PCT % 25   MONOS PCT % 12   EOS PCT % 2       Results from last 7 days  Lab Units 18  0530 18  0508   SODIUM mmol/L 140 141   POTASSIUM mmol/L 4 4 4 1 CHLORIDE mmol/L 108 108   CO2 mmol/L 25 24   BUN mg/dL 10 11   CREATININE mg/dL 0 78 0 83   CALCIUM mg/dL 7 6* 8 1*   TOTAL PROTEIN g/dL  --  5 7*   BILIRUBIN TOTAL mg/dL  --  0 30   ALK PHOS U/L  --  75   ALT U/L  --  25   AST U/L  --  15   GLUCOSE RANDOM mg/dL 94 117       Results from last 7 days  Lab Units 05/12/18  0508   INR  1 10       Results from last 7 days  Lab Units 05/11/18  1805   POC GLUCOSE mg/dl 136       Results from last 7 days  Lab Units 05/12/18  0508   HEMOGLOBIN A1C % 5 5         * I Have Reviewed All Lab Data Listed Above  * Additional Pertinent Lab Tests Reviewed: All Labs Within Last 24 Hours Reviewed    Imaging:    Imaging Reports Reviewed Today Include: none  Imaging Personally Reviewed by Myself Includes:  none    Recent Cultures (last 7 days):           Last 24 Hours Medication List:     Current Facility-Administered Medications:  acetaminophen 650 mg Oral Q6H PRN Pepe Gaytan PA-C   apixaban 5 mg Oral BID Vahe Camacho DO   aspirin 81 mg Oral Daily Odilon Mosher PA-C   metoprolol tartrate 25 mg Oral Q12H [facility] Andre Ortega MD   morphine injection 1 mg Intravenous Q4H PRN Pepe Gaytan PA-C   nicotine 1 patch Transdermal Daily Pepe Gaytan PA-C   ondansetron 4 mg Intravenous Q6H PRN Pepe Gaytan PA-C   oxyCODONE 5 mg Oral Q4H PRN John Cox MD        Today, Patient Was Seen By: Liliam Rodriguez PA-C    ** Please Note: Dictation voice to text software may have been used in the creation of this document   **

## 2018-05-14 NOTE — ASSESSMENT & PLAN NOTE
Rate controlled on metoprolol 25mg PO BID  Cardiology consulted and recommended AGNIESZKA-guided cardioversion which is scheduled for tomorrow 5/15/18 at 9 am   She also recommended starting eliquis x 1 month then resuming aspirin  Will consult Hematology given patient stating he may have Rafael Leatha given family history  VWF activity pending

## 2018-05-14 NOTE — CONSULTS
Consultation - Medical Oncology   Renan Trimble 53 y o  male MRN: [id number]  Unit/Bed#:  Encounter: [id number]    History of Present Illness   Physician Requesting Consult: Marvin Viramontes MD  Reason for Consult / Principal Problem:  Family history of von Willebrand's disease  HPI: Renan Trimble is a 53y o  year old male who presents with a syncopal episode while we lacking in his garden precipitating this hospital admission  He fell forward onto a concrete walk way  After awakening he noted discomfort of the jaw all and left shoulder, he sustained a left upper cracked tooth and abrasions were noted over the right side of the face and of the left knee  He was found to have atrial fibrillation with rapid ventricular response  He was seen in consultation by Dr Franklin Chao today who recommended that he undergo a AGNIESZKA-guided cardioversion because of his young age and furthermore that he receive 1 month of anticoagulation with apixaban and thereafter he can resume aspirin    Inpatient consult to Hematology  Consult performed by: Samuel Mancera ordered by: Sanchez Camara:   General: Feels well, no chills or swaets  Head and Neck: No nosebleeds, no oral cavity or throat soreness  Cardiovascular: No chest pain, no lower extremity edema  Respriatory: No cough or dyspnea  GI: Appetite is good, no abdominal pain, bowel habits formed and regular  : No urinary frequency  Musculoskeletal:   He has chronic low back pain which is aggravated by bending in climbing  Skin: No skin rash  Neurological:  He has history of migraine headache especially in hot humid weather, however, migraine has gradually diminished, the last migraine headache was about 4 years ago    Hematologic: No easy bruising  Psychiatric: No emotional problems    Historical Information   Past Medical History:   Diagnosis Date    H/O von Willebrand's disease     Migraines     Wrist fracture     left     Past Surgical History:   Procedure Laterality Date    WISDOM TOOTH EXTRACTION       He had extraction of all 4 wisdom teeth in nearly 2000s, there was no excessive bleeding afterwards  Social History   History   Alcohol Use No     History   Drug Use No     History   Smoking Status    Current Every Day Smoker    Packs/day: 1 00    Types: Cigarettes   Smokeless Tobacco    Never Used     He is  and lives with his wife  He drinks less than 1 case of beer per year  He has worked as an , particularly, high voltage for industrial applications  Family History: History reviewed  No pertinent family history  His mother age 68 has von Willebrand's disease and breast cancer in remission  His father age [de-identified] has malignant melanoma of the forehead in remission  He has a sister with history of migraine headache (she is not known to have von Willebrand's disease  )  He has a son age 9 and a daughter age 3, both of his children are in good health    Meds/Allergies   current meds:   Current Facility-Administered Medications   Medication Dose Route Frequency    acetaminophen (TYLENOL) tablet 650 mg  650 mg Oral Q6H PRN    apixaban (ELIQUIS) tablet 5 mg  5 mg Oral BID    aspirin chewable tablet 81 mg  81 mg Oral Daily    metoprolol tartrate (LOPRESSOR) tablet 25 mg  25 mg Oral Q12H [facility]    morphine injection 1 mg  1 mg Intravenous Q4H PRN    nicotine (NICODERM CQ) 21 mg/24 hr TD 24 hr patch 1 patch  1 patch Transdermal Daily    ondansetron (ZOFRAN) injection 4 mg  4 mg Intravenous Q6H PRN    oxyCODONE (ROXICODONE) IR tablet 5 mg  5 mg Oral Q4H PRN    and PTA meds:  No prescriptions prior to admission  No Known Allergies    Objective   Vitals: Blood pressure 108/88, pulse 92, temperature 97 9 °F (36 6 °C), temperature source Temporal, resp  rate (!) 25, height 5' 11" (1 803 m), weight 70 7 kg (155 lb 13 8 oz), SpO2 98 %      Intake/Output Summary (Last 24 hours) at 05/14/18 1386  Last data filed at 05/14/18 1710   Gross per 24 hour   Intake             1280 ml   Output             4400 ml   Net            -3120 ml     Invasive Devices     Peripheral Intravenous Line            Peripheral IV 05/11/18 Right Antecubital 2 days                Physical Exam: General appearance: Appears well  Head: Normocephalic  Eyes: Extraocular movements intact  Ears: No gross hearing deficit  Oropharynx: Clear  Neck: Supple, No lymphadenopathy  Chest: No axillary adenopathy  Lungs: Clear to auscultation bilaterally  Heart:  Rhythm is irregular  Abdomen: No hepatic or splenic enlargement; No inguinal  lymphadenopathy  Extremities: No lower extremity edema bilterally  Skin: No rashes  No ecchymoses other than abrasions of the right side of the face and chin and left knee  Neurologic: Grossly intact, no focal neurological deficit  Psychiatric: Oriented to person, place and time, normal mood and affect    Lab Results:     From May 12, 2018:  PT INR is 1 10 and APTT is 28 sec, creatinine 0 83, alk-phos 75, bili 0 3, AST 15, ALT 25, WBC 9 12, hemoglobin 13 5, platelets 888    CT of the head and neck with and without contrast from May 13, 2018:  No intracranial hemorrhage, intracranial mass, or evidence of acute infarction  CT angiogram of the chest and abdomen from May 11, 2018:  Lungs are clear, mediastinum and hilar are unremarkable, there is mild hepatic steatosis and hepatomegaly, spleen, pancreas, adrenals, kidneys are unremarkable, no abdominal or pelvic lymphadenopathy  Assessment/Plan     Assessment:    Family history of von Willebrand's disease i e  his mother  There is no personal history of bleeding diathesis including that after 4 wisdom teeth extraction in the early 2000s  APTT and platelet count are in normal range    The diagnosis of von Willebrand's disease is unlikely in this individual     Plan:    Nubia Lita cofactor has already been ordered and in addition factor 8 antigen and factor 8 activity are to be obtained from the specimen a ready drawn on May 12, 2018  The [facility] was informed to request this additional testing on the May 12, 2018 specimen  If the studies are unremarkable type 1 and 3 von Willebrand's disease have been ruled out  Evaluation for type 2 von Willebrand's disease is more complex and would require that the patient be off aspirin for ristocetin induced platelet aggregation testing in addition to von Willebrand's multimer analysis  Meanwhile, based upon the clinical assessment thus far it is reasonable to continue anticoagulation with apixaban as has been recommended by the cardiology service  Medical attention is to be sought promptly for unexplained nose bleeds, gingival bleeding, excessive bruising or other bleeding problems  Counseling / Coordination of Care  Total floor / unit time spent today 45 minutes  Greater than 50% of total time was spent with the patient and / or family counseling and / or coordination of care  A description of the counseling / coordination of care:  Diagnostic tests, impressions and recommendations were reviewed with the patient and with Silvio Kyle, [facility] Internal Medicine

## 2018-05-14 NOTE — ASSESSMENT & PLAN NOTE
Likely 2/2 cardiac arrhythmia  Cardiology consulted and felt the patient may have had a post-conversion pause and recommend an implantation of a loop recorder as an outpatient     CTA head/neck reviewed

## 2018-05-15 ENCOUNTER — APPOINTMENT (INPATIENT)
Dept: NON INVASIVE DIAGNOSTICS | Facility: HOSPITAL | Age: 48
DRG: 309 | End: 2018-05-15
Attending: INTERNAL MEDICINE
Payer: COMMERCIAL

## 2018-05-15 ENCOUNTER — ANESTHESIA (INPATIENT)
Dept: PERIOP | Facility: HOSPITAL | Age: 48
DRG: 309 | End: 2018-05-15
Payer: COMMERCIAL

## 2018-05-15 VITALS
TEMPERATURE: 99 F | BODY MASS INDEX: 21.67 KG/M2 | WEIGHT: 154.76 LBS | HEART RATE: 118 BPM | DIASTOLIC BLOOD PRESSURE: 73 MMHG | HEIGHT: 71 IN | SYSTOLIC BLOOD PRESSURE: 123 MMHG | RESPIRATION RATE: 24 BRPM | OXYGEN SATURATION: 97 %

## 2018-05-15 LAB
ANION GAP SERPL CALCULATED.3IONS-SCNC: 9 MMOL/L (ref 4–13)
ATRIAL RATE: 258 BPM
BASOPHILS # BLD AUTO: 0.02 THOUSANDS/ΜL (ref 0–0.1)
BASOPHILS NFR BLD AUTO: 0 % (ref 0–1)
BUN SERPL-MCNC: 12 MG/DL (ref 5–25)
CALCIUM SERPL-MCNC: 8.6 MG/DL (ref 8.3–10.1)
CHLORIDE SERPL-SCNC: 103 MMOL/L (ref 100–108)
CO2 SERPL-SCNC: 27 MMOL/L (ref 21–32)
CREAT SERPL-MCNC: 0.82 MG/DL (ref 0.6–1.3)
EOSINOPHIL # BLD AUTO: 0.13 THOUSAND/ΜL (ref 0–0.61)
EOSINOPHIL NFR BLD AUTO: 1 % (ref 0–6)
ERYTHROCYTE [DISTWIDTH] IN BLOOD BY AUTOMATED COUNT: 11.6 % (ref 11.6–15.1)
GFR SERPL CREATININE-BSD FRML MDRD: 105 ML/MIN/1.73SQ M
GLUCOSE SERPL-MCNC: 98 MG/DL (ref 65–140)
HCT VFR BLD AUTO: 42.5 % (ref 36.5–49.3)
HGB BLD-MCNC: 15.4 G/DL (ref 12–17)
LYMPHOCYTES # BLD AUTO: 1.46 THOUSANDS/ΜL (ref 0.6–4.47)
LYMPHOCYTES NFR BLD AUTO: 16 % (ref 14–44)
MCH RBC QN AUTO: 30.7 PG (ref 26.8–34.3)
MCHC RBC AUTO-ENTMCNC: 36.2 G/DL (ref 31.4–37.4)
MCV RBC AUTO: 85 FL (ref 82–98)
MONOCYTES # BLD AUTO: 1.13 THOUSAND/ΜL (ref 0.17–1.22)
MONOCYTES NFR BLD AUTO: 12 % (ref 4–12)
NEUTROPHILS # BLD AUTO: 6.42 THOUSANDS/ΜL (ref 1.85–7.62)
NEUTS SEG NFR BLD AUTO: 71 % (ref 43–75)
PLATELET # BLD AUTO: 227 THOUSANDS/UL (ref 149–390)
PMV BLD AUTO: 9.8 FL (ref 8.9–12.7)
POTASSIUM SERPL-SCNC: 4.1 MMOL/L (ref 3.5–5.3)
QRS AXIS: 68 DEGREES
QRSD INTERVAL: 90 MS
QT INTERVAL: 350 MS
QTC INTERVAL: 401 MS
RBC # BLD AUTO: 5.01 MILLION/UL (ref 3.88–5.62)
SODIUM SERPL-SCNC: 139 MMOL/L (ref 136–145)
T WAVE AXIS: 33 DEGREES
VENTRICULAR RATE: 79 BPM
WBC # BLD AUTO: 9.16 THOUSAND/UL (ref 4.31–10.16)

## 2018-05-15 PROCEDURE — 92960 CARDIOVERSION ELECTRIC EXT: CPT | Performed by: INTERNAL MEDICINE

## 2018-05-15 PROCEDURE — 93010 ELECTROCARDIOGRAM REPORT: CPT | Performed by: INTERNAL MEDICINE

## 2018-05-15 PROCEDURE — B246ZZ4 ULTRASONOGRAPHY OF RIGHT AND LEFT HEART, TRANSESOPHAGEAL: ICD-10-PCS | Performed by: FAMILY MEDICINE

## 2018-05-15 PROCEDURE — 99232 SBSQ HOSP IP/OBS MODERATE 35: CPT | Performed by: INTERNAL MEDICINE

## 2018-05-15 PROCEDURE — 80048 BASIC METABOLIC PNL TOTAL CA: CPT | Performed by: PHYSICIAN ASSISTANT

## 2018-05-15 PROCEDURE — 85025 COMPLETE CBC W/AUTO DIFF WBC: CPT | Performed by: PHYSICIAN ASSISTANT

## 2018-05-15 PROCEDURE — 93312 ECHO TRANSESOPHAGEAL: CPT | Performed by: INTERNAL MEDICINE

## 2018-05-15 PROCEDURE — 5A2204Z RESTORATION OF CARDIAC RHYTHM, SINGLE: ICD-10-PCS | Performed by: FAMILY MEDICINE

## 2018-05-15 PROCEDURE — 99238 HOSP IP/OBS DSCHRG MGMT 30/<: CPT | Performed by: PHYSICIAN ASSISTANT

## 2018-05-15 PROCEDURE — 93005 ELECTROCARDIOGRAM TRACING: CPT

## 2018-05-15 RX ORDER — PROPOFOL 10 MG/ML
INJECTION, EMULSION INTRAVENOUS CONTINUOUS PRN
Status: DISCONTINUED | OUTPATIENT
Start: 2018-05-15 | End: 2018-05-15 | Stop reason: SURG

## 2018-05-15 RX ORDER — KETAMINE HYDROCHLORIDE 50 MG/ML
INJECTION, SOLUTION, CONCENTRATE INTRAMUSCULAR; INTRAVENOUS AS NEEDED
Status: DISCONTINUED | OUTPATIENT
Start: 2018-05-15 | End: 2018-05-15 | Stop reason: SURG

## 2018-05-15 RX ORDER — METOPROLOL SUCCINATE 100 MG/1
50 TABLET, EXTENDED RELEASE ORAL DAILY
Qty: 30 TABLET | Refills: 0 | Status: SHIPPED | OUTPATIENT
Start: 2018-05-15 | End: 2018-06-04 | Stop reason: SDUPTHER

## 2018-05-15 RX ORDER — PROPOFOL 10 MG/ML
INJECTION, EMULSION INTRAVENOUS AS NEEDED
Status: DISCONTINUED | OUTPATIENT
Start: 2018-05-15 | End: 2018-05-15 | Stop reason: SURG

## 2018-05-15 RX ORDER — METOPROLOL TARTRATE 5 MG/5ML
5 INJECTION INTRAVENOUS ONCE
Status: COMPLETED | OUTPATIENT
Start: 2018-05-15 | End: 2018-05-15

## 2018-05-15 RX ORDER — SODIUM CHLORIDE, SODIUM LACTATE, POTASSIUM CHLORIDE, CALCIUM CHLORIDE 600; 310; 30; 20 MG/100ML; MG/100ML; MG/100ML; MG/100ML
50 INJECTION, SOLUTION INTRAVENOUS CONTINUOUS
Status: DISCONTINUED | OUTPATIENT
Start: 2018-05-15 | End: 2018-05-15 | Stop reason: HOSPADM

## 2018-05-15 RX ADMIN — PROPOFOL 100 MG: 10 INJECTION, EMULSION INTRAVENOUS at 09:00

## 2018-05-15 RX ADMIN — PROPOFOL 120 MCG/KG/MIN: 10 INJECTION, EMULSION INTRAVENOUS at 09:00

## 2018-05-15 RX ADMIN — ASPIRIN 81 MG 81 MG: 81 TABLET ORAL at 07:18

## 2018-05-15 RX ADMIN — METOPROLOL TARTRATE 25 MG: 25 TABLET ORAL at 07:18

## 2018-05-15 RX ADMIN — KETAMINE HYDROCHLORIDE 50 MG: 50 INJECTION, SOLUTION INTRAMUSCULAR; INTRAVENOUS at 09:00

## 2018-05-15 RX ADMIN — METOPROLOL TARTRATE 25 MG: 25 TABLET ORAL at 12:59

## 2018-05-15 RX ADMIN — LIDOCAINE HYDROCHLORIDE 100 MG: 20 INJECTION, SOLUTION INTRAVENOUS at 09:00

## 2018-05-15 RX ADMIN — SODIUM CHLORIDE, SODIUM LACTATE, POTASSIUM CHLORIDE, AND CALCIUM CHLORIDE: .6; .31; .03; .02 INJECTION, SOLUTION INTRAVENOUS at 08:54

## 2018-05-15 RX ADMIN — APIXABAN 5 MG: 5 TABLET, FILM COATED ORAL at 17:08

## 2018-05-15 RX ADMIN — SODIUM CHLORIDE, SODIUM LACTATE, POTASSIUM CHLORIDE, AND CALCIUM CHLORIDE 125 ML/HR: .6; .31; .03; .02 INJECTION, SOLUTION INTRAVENOUS at 06:54

## 2018-05-15 RX ADMIN — APIXABAN 5 MG: 5 TABLET, FILM COATED ORAL at 07:19

## 2018-05-15 RX ADMIN — METOPROLOL TARTRATE 5 MG: 5 INJECTION INTRAVENOUS at 12:16

## 2018-05-15 NOTE — DISCHARGE INSTRUCTIONS
A-fib (Atrial Fibrillation)   AMBULATORY CARE:   Atrial fibrillation (a-fib)  is an irregular heartbeat  It reduces your heart's ability to pump blood through your body  A-fib may come and go, or it may be a long-term condition  A-fib can cause life-threatening blood clots, stroke, or heart failure  It is important to treat and manage a-fib to help prevent these problems  Common signs and symptoms include the following:   · A heartbeat that races, pounds, or flutters    · Weakness, severe tiredness, or confusion    · Feeling lightheaded, sweaty, dizzy, or faint    · Shortness of breath or anxiety    · Chest pain or pressure  Call 911 for any of the following:   · You have any of the following signs of a heart attack:      ¨ Squeezing, pressure, or pain in your chest that lasts longer than 5 minutes or returns    ¨ Discomfort or pain in your back, neck, jaw, stomach, or arm     ¨ Trouble breathing    ¨ Nausea or vomiting    ¨ Lightheadedness or a sudden cold sweat, especially with chest pain or trouble breathing    · You have any of the following signs of a stroke:      ¨ Numbness or drooping on one side of your face     ¨ Weakness in an arm or leg    ¨ Confusion or difficulty speaking    ¨ Dizziness, a severe headache, or vision loss  Seek care immediately if:  You have any of the following signs of a blood clot:  · You feel lightheaded, are short of breath, and have chest pain  · You cough up blood  · You have swelling, redness, pain, or warmth in your arm or leg  Contact your cardiologist or healthcare provider if:   · Your target heart rate is not in the range it should be  · You have new or worsening swelling in your legs, feet, ankles, or abdomen  · You are short of breath, even at rest      · You have questions or concerns about your condition or care  Treatment for A-fib:  Conditions that cause a-fib, such as thyroid disease, will be treated   You may also need any of the following:  · Heart medicines  help control your heart rate and rhythm  You may need more than one medicine to treat your symptoms  · Antiplatelet and blood thinner medicines  help prevent blood clots  · Cardioversion  is a procedure to return your heart rate and rhythm to normal  It can be done using medicines or electric shock  · A-fib ablation  is a procedure that uses energy to burn a small area of heart tissue  This creates scar tissue and prevents electrical signals that cause a-fib  You may need this procedure more than once  Ask for more information on a-fib ablation  · A pacemaker  may be inserted into your heart  A pacemaker is a device that controls your heartbeat  A pacemaker may be inserted during an ablation procedure or surgery  Ask your healthcare provider for more information on pacemakers  · Surgery  may be needed if other procedures do not work  During surgery your healthcare provider will make cuts in the upper part of your heart  The provider will stitch the cuts together to create scar tissue  The scar tissue will prevent electrical signals that cause a-fib  Manage A-fib:   · Know your target heart rate  Learn how to take your pulse and monitor your heart rate  · Manage other health conditions  This includes high blood pressure, sleep apnea, thyroid disease, diabetes, and other heart conditions  Take medicine as directed and follow your treatment plan  · Limit or do not drink alcohol  Alcohol can make a-fib hard to manage  Ask your healthcare provider if it is safe for you to drink alcohol  A drink of alcohol is 12 ounces of beer, 5 ounces of wine, or 1½ ounces of liquor  · Do not smoke  Nicotine and other chemicals in cigarettes and cigars can cause heart and lung damage  Ask your healthcare provider for information if you currently smoke and need help to quit  E-cigarettes or smokeless tobacco still contain nicotine   Talk to your healthcare provider before you use these products  · Eat heart-healthy foods  Heart healthy foods will help keep your cholesterol low  These include fruits, vegetables, whole-grain breads, low-fat dairy products, beans, lean meats, and fish  Replace butter and margarine with heart-healthy oils such as olive oil and canola oil  · Maintain a healthy weight  Ask your healthcare provider how much you should weigh  Ask him to help you create a weight loss plan if you are overweight  · Exercise for 30 minutes  most days of the week  Ask your healthcare provider about the best exercise plan for you  Follow up with your cardiologist as directed: You will need regular blood tests and monitoring  Write down your questions so you remember to ask them during your visits  © 2017 [employer]  Information is for End User's use only and may not be sold, redistributed or otherwise used for commercial purposes  All illustrations and images included in CareNotes® are the copyrighted property of A [employer] M [employer]  or [employer] Weather[employer]  The above information is an  only  It is not intended as medical advice for individual conditions or treatments  Talk to your doctor, nurse or pharmacist before following any medical regimen to see if it is safe and effective for you  A-fib (Atrial Fibrillation)   WHAT YOU NEED TO KNOW:   A-fib may come and go, or it may be a long-term condition  A-fib can cause blood clots, stroke, or heart failure  These conditions may become life-threatening  It is important to treat and manage a-fib to help prevent a blood clot, stroke, or heart failure          DISCHARGE INSTRUCTIONS:   Call 911 for any of the following:   · You have any of the following signs of a heart attack:      ¨ Squeezing, pressure, or pain in your chest that lasts longer than 5 minutes or returns    ¨ Discomfort or pain in your back, neck, jaw, stomach, or arm     ¨ Trouble breathing    ¨ Nausea or vomiting    ¨ Lightheadedness or a sudden cold sweat, especially with chest pain or trouble breathing    · You have any of the following signs of a stroke:      ¨ Numbness or drooping on one side of your face     ¨ Weakness in an arm or leg    ¨ Confusion or difficulty speaking    ¨ Dizziness, a severe headache, or vision loss  Seek care immediately if:  You have any of the following signs of a blood clot:  · You feel lightheaded, are short of breath, and have chest pain  · You cough up blood  · You have swelling, redness, pain, or warmth in your arm or leg  Contact your cardiologist or healthcare provider if:   · Your heart rate is higher than your healthcare provider said it should be  · You have new or worsening swelling in your legs, feet, ankles, or abdomen  · You are short of breath, even at rest      · You have questions or concerns about your condition or care  Medicines: You may need any of the following:  · Heart medicines  help control your heart rate and rhythm  You may need more than one medicine to treat your symptoms  · Blood thinners    help prevent blood clots  Examples of blood thinners include heparin and warfarin  Clots can cause strokes, heart attacks, and death  The following are general safety guidelines to follow while you are taking a blood thinner:    ¨ Watch for bleeding and bruising while you take blood thinners  Watch for bleeding from your gums or nose  Watch for blood in your urine and bowel movements  Use a soft washcloth on your skin, and a soft toothbrush to brush your teeth  This can keep your skin and gums from bleeding  If you shave, use an electric shaver  Do not play contact sports  ¨ Tell your dentist and other healthcare providers that you take anticoagulants  Wear a bracelet or necklace that says you take this medicine  ¨ Do not start or stop any medicines unless your healthcare provider tells you to   Many medicines cannot be used with blood thinners  ¨ Tell your healthcare provider right away if you forget to take the medicine, or if you take too much  ¨ Warfarin  is a blood thinner that you may need to take  The following are things you should be aware of if you take warfarin  § Foods and medicines can affect the amount of warfarin in your blood  Do not make major changes to your diet while you take warfarin  Warfarin works best when you eat about the same amount of vitamin K every day  Vitamin K is found in green leafy vegetables and certain other foods  Ask for more information about what to eat when you are taking warfarin  § You will need to see your healthcare provider for follow-up visits when you are on warfarin  You will need regular blood tests  These tests are used to decide how much medicine you need  · Antiplatelets , such as aspirin, help prevent blood clots  Take your antiplatelet medicine exactly as directed  These medicines make it more likely for you to bleed or bruise  If you are told to take aspirin, do not take acetaminophen or ibuprofen instead  · Take your medicine as directed  Contact your healthcare provider if you think your medicine is not helping or if you have side effects  Tell him or her if you are allergic to any medicine  Keep a list of the medicines, vitamins, and herbs you take  Include the amounts, and when and why you take them  Bring the list or the pill bottles to follow-up visits  Carry your medicine list with you in case of an emergency  Follow up with your cardiologist as directed: You will need regular blood tests and monitoring  Write down your questions so you remember to ask them during your visits  Manage A-fib:   · Know your target heart rate  Learn how to take your pulse and monitor your heart rate  · Manage other health conditions  This includes high blood pressure, sleep apnea, thyroid disease, diabetes, and other heart conditions   Take medicine as directed and follow your treatment plan  · Limit or do not drink alcohol  Alcohol can make a-fib hard to manage  Ask your healthcare provider if it is safe for you to drink alcohol  A drink of alcohol is 12 ounces of beer, 5 ounces of wine, or 1½ ounces of liquor  · Do not smoke  Nicotine and other chemicals in cigarettes and cigars can cause heart and lung damage  Ask your healthcare provider for information if you currently smoke and need help to quit  E-cigarettes or smokeless tobacco still contain nicotine  Talk to your healthcare provider before you use these products  · Eat heart-healthy foods  Heart healthy foods will help keep your cholesterol low  These include fruits, vegetables, whole-grain breads, low-fat dairy products, beans, lean meats, and fish  Replace butter and margarine with heart-healthy oils such as olive oil and canola oil  · Maintain a healthy weight  Ask your healthcare provider how much you should weigh  Ask him to help you create a weight loss plan if you are overweight  · Exercise for 30 minutes  most days of the week  Ask your healthcare provider about the best exercise plan for you  © 2017 [facility] [facility] Information is for End User's use only and may not be sold, redistributed or otherwise used for commercial purposes  All illustrations and images included in CareNotes® are the copyrighted property of A [employer] A M [employer] Inc  or Dante Weathers  The above information is an  only  It is not intended as medical advice for individual conditions or treatments  Talk to your doctor, nurse or pharmacist before following any medical regimen to see if it is safe and effective for you

## 2018-05-15 NOTE — SOCIAL WORK
Cm spoke with the patient and they are for d/c to home today  Cm is taking into account that the patient has preferences while planning the d/c needs  Cm plan was discussed with the patient and the patient is agreeable to the plan the patient does understand the importance of follow up care and taking the medications as prescribed  The patient is aware of any symptoms that he should look for when d/c  The patient was to the or today for a cardioversion and he will be d/c to home  later I did get a 30 day free card for the eliquis and I gave it to him for the eliquis I also told him to call Dr Israel Velazquez and get a follow up appointment and he is aware that he has an appointment with Dr Marcial Arizmendi   He has no other needs and he is agreeable to the d/c plan

## 2018-05-15 NOTE — NURSING NOTE
Patient transferred to OR via strecher with monitor on in no acute distress  IV patent   Patient status discussed with Dr Anjelica Espinoza

## 2018-05-15 NOTE — ASSESSMENT & PLAN NOTE
On admission the patient was noted to be in atrial fibrillation with a heart rate in the 160's requiring a Cardizem gtt  On 5/12/18 the Cardizem gtt was discontinued and he was started on metoprolol 25mg PO BID  Cardiology was consulted and recommended AGNIESZKA-guided cardioversion which was performed on 5/15/18  Cardiology also recommended starting eliquis x 1 month then resuming aspirin  Hematology was consulted given patient stating he may have Colletta Spice given family history  Hematology felt it was unlikely that he has Colletta Spice after speaking to the patient obtain further history  Labs were drawn to rule out Colletta Spice and was pending at discharge  The patient was discharged home on Metoprolol succinate 50 mg PO daily for rate control  The patient will need to follow up with PCP regarding the results  The patient will also need to follow up with Dr Naveen Erickson in the office  An appointment was made for him for early June 2018

## 2018-05-15 NOTE — DISCHARGE SUMMARY
Discharge- Roge Keller 1970, 53 y o  male MRN: [id number]    Unit/Bed#: [facility] 205 Encounter: [id number]    Primary Care Provider: Margie Soria MD   Date and time admitted to hospital: 5/11/2018  5:45 PM        Syncope and collapse   Assessment & Plan    Likely 2/2 cardiac arrhythmia  Cardiology consulted and felt the patient may have had a post-conversion pause and recommend an implantation of a loop recorder as an outpatient  He will also need to follow up with Cardiology as an outpatient  CTA head/neck reviewed        * Atrial fibrillation with RVR ([address] )   Assessment & Plan    On admission the patient was noted to be in atrial fibrillation with a heart rate in the 160's requiring a Cardizem gtt  On 5/12/18 the Cardizem gtt was discontinued and he was started on metoprolol 25mg PO BID  Cardiology was consulted and recommended AGNIESZKA-guided cardioversion which was performed on 5/15/18  Cardiology also recommended starting eliquis x 1 month then resuming aspirin  Hematology was consulted given patient stating he may have Wynelle Oneonta given family history  Hematology felt it was unlikely that he has Wynelle Oneonta after speaking to the patient obtain further history  Labs were drawn to rule out Wynelle Oneonta and was pending at discharge  The patient was discharged home on Metoprolol succinate 50 mg PO daily for rate control  The patient will need to follow up with PCP regarding the results  The patient will also need to follow up with Dr Jimmy Flores in the office  An appointment was made for him for early June 2018  Foreign body in pharynx   Assessment & Plan    Patient was asymptomatic and Xray of neck soft tissue was negative for acute findings  No additional follow up needed per ENT  Leukocytosis   Assessment & Plan    -WBC at 12 18 on admission, now resolved  -No fever, chills or immediate source of infection          Hypokalemia   Assessment & Plan    -Potassium at 2 9 on admission  The patient was given potassium supplementation and has resolved  Acute pain of left shoulder   Assessment & Plan    The patient had complaints of left shoulder pain after fall  Imaging was negative and the patient's pain had resolved  Facial laceration   Assessment & Plan    -Sustained during fall at home  -No associated facial bone fracture, or severe bleeding  -Head CT- No acute intracranial abnormality   -No visual disturbances  -the patient was instructed to follow up with PCP within 1 week  Smoker   Assessment & Plan    -Smoking cessation counseling  -Nicotine patch                    Resolved Problems  Date Reviewed: 5/15/2018    None          Admission Date:   Admission Orders     Ordered        05/11/18 1954  Inpatient Admission (expected length of stay for this patient is greater than two midnights)  Once               Admitting Diagnosis: Head injury [S09 90XA]  Atrial fibrillation with rapid ventricular response ([facility] [address] ) [I48 91]  Concussion with loss of consciousness, initial encounter [S06 0X9A]    HPI: Shai Winchester is a 53 y o  male who presents to the emergency room brought in by EMS status post syncope while at home sustaining facial lacerations  Patient reports being at home doing all the cleaning then remembered waking up in his yard bleeding from his face  His wife states that she heard a thump but did not check upon it immediately upon checking a few minutes later she found them her lying on the ground  He was found to be in rapid AFib by EMS  Rate low at without intervention but became a rapid again upon arrival in emergency room  Patient denies feeling ill her having any dizziness weakness, palpitation or chest pain prior to incident  He also denies having prior history of atrial fibrillation       Procedures Performed:   Orders Placed This Encounter   Procedures    Cardiac cardioversion (cath lab / OR only)   [address] ED ECG Documentation Only    Laceration repair    Laceration repair       Summary of Hospital Course: Please see above assessment and plan    Significant Findings, Care, Treatment and Services Provided: potassium 2 9    Complications: none    Condition at Discharge: good         Discharge instructions/Information to patient and family:   See after visit summary for information provided to patient and family  Provisions for Follow-Up Care:  See after visit summary for information related to follow-up care and any pertinent home health orders  PCP: Ayleen Draper MD    Disposition: Home    Planned Readmission: No    Discharge Statement   I spent 25 minutes discharging the patient  This time was spent on the day of discharge  I had direct contact with the patient on the day of discharge  Additional documentation is required if more than 30 minutes were spent on discharge  Discharge Medications:  See after visit summary for reconciled discharge medications provided to patient and family

## 2018-05-15 NOTE — CASE MANAGEMENT
Continued Stay Review    Date: 05/12-05/15/18    Vital Signs: /82   Pulse (!) 115   Temp 98 1 °F (36 7 °C) (Temporal)   Resp 20   Ht 5' 11" (1 803 m)   Wt 70 2 kg (154 lb 12 2 oz)   SpO2 97%   BMI 21 59 kg/m²     Medications:   Scheduled Meds:   Current Facility-Administered Medications:  acetaminophen 650 mg Oral Q6H PRN   apixaban 5 mg Oral BID   aspirin 81 mg Oral Daily   lactated ringers 125 mL/hr Intravenous Continuous   lactated ringers 50 mL/hr Intravenous Continuous   metoprolol tartrate 25 mg Oral Q12H LORRIE   morphine injection 1 mg Intravenous Q4H PRN   nicotine 1 patch Transdermal Daily   ondansetron 4 mg Intravenous Q6H PRN   oxyCODONE 5 mg Oral Q4H PRN     Continuous Infusions:   lactated ringers 125 mL/hr Last Rate: 125 mL/hr (05/15/18 0654)   lactated ringers 50 mL/hr Last Rate: 50 mL/hr (05/15/18 1100)     PRN Meds:   acetaminophen    morphine injection    ondansetron    oxyCODONE    Abnormal Labs/Diagnostic Results:   05/13:    Calcium 7 6      CTA head and neck:  No significant brain parenchymal abnormalities, consistent with prior study  No acute intracranial hemorrhage or mass effect  No evidence of significant stenosis, dissection or occlusion involving cervical carotid or vertebral segments or visualized cerebral arteries  XR soft tissue neck: WNL     Age/Sex: 53 y o  male     Assessment/Plan:   Syncope and collapse   Assessment & Plan     Likely 2/2 cardiac arrhythmia  Cardiology consulted and felt the patient may have had a post-conversion pause and recommend an implantation of a loop recorder as an outpatient  CTA head/neck reviewed          * Atrial fibrillation with RVR (HCC)   Assessment & Plan     Rate controlled on metoprolol 25mg PO BID  Cardiology consulted and recommended AGNIESZKA-guided cardioversion which is scheduled for tomorrow 5/15/18 at 9 am   She also recommended starting eliquis x 1 month then resuming aspirin   Will consult Hematology given patient stating he may have Yasmine Boykin given family history  VWF activity pending            Foreign body in pharynx   Assessment & Plan     ENT Input appreciated  Patient asymptomatic  Xray of neck soft tissue is negative for acute findings            Leukocytosis   Assessment & Plan     -WBC at 12 18 on admission, now resolved  -No fever, chills or immediate source of infection  -continue to monitor           Hypokalemia   Assessment & Plan     -Potassium at 2 9 on admission and is noted to be 3 0 this am    -Replace potassium  -repeat labs in am           Acute pain of left shoulder   Assessment & Plan     Resolved                 VTE Pharmacologic Prophylaxis:   Pharmacologic: Apixaban (Eliquis)  Mechanical VTE Prophylaxis in Place: Yes      Current Patient Status: Inpatient   Certification Statement: The patient will continue to require additional inpatient hospital stay due to continued monitoring and managment of atrial fibrillation  Cardiology Progress Note - Pillo Thorne 53 y o  male MRN: [id number]  Assessment:  1   Atrial fibrillation - presumably new onset - [id number]- VASc = 0  2   Tobacco abuse   3   Syncope  4   Incomplete right bundle branch block     Plan:  1  For AGNIESZKA - CV today   2  Change metoprolol to succinate 50 mg upon discharge  3  Eliquis for one month post-procedure  4  Dicussed ILR to which patient agreeable given syncope - can be arranged as outpatient  5   Echo noted and within normal limits  6  Patient can be discharged post-procedure - follow-up with me scheduled in early June       Procedure: AGNIESZKA/ DCCV     Indication:  Atrial fibrillation      Anti-coagulation:  Eliquis 5 mg on AM of procedure      Anesthesia: Conscious sedation provided by anesthesiology with Propofol     AGNIESZKA findings:  No left atrial or left atrial appendage thrombus     Electrical Pad Placement: Anteroposteriorly interscapular region and upper sternum;   Successful cardioversion following a single synchronized biphasic shock at 838 J     Complications: None     Post Procedure Findings: Sinus rhythm documented by 12 lead ECG      Disposition: Patient to PACU post-procedure          Discharge Plan: TBD          Thank you,  [facility] in the [facility] by [employer] for 2017  Network Utilization Review Department  Phone: [phone]; Fax [phone]  ATTENTION: The Network Utilization Review Department is now centralized for our 7 Facilities  Make a note that we have a new phone and fax numbers for our Department  Please call with any questions or concerns to [phone] and carefully follow the prompts so that you are directed to the right person  All voicemails are confidential  Fax any determinations, approvals, denials, and requests for initial or continue stay review clinical to [phone]  Due to HIGH CALL volume, it would be easier if you could please send faxed requests to expedite your requests and in part, help us provide discharge notifications faster

## 2018-05-15 NOTE — NURSING NOTE
Patient DC to home with his wife and children in no acute distress  Patient reports understanding of his DC instructions and has copy of the same  [facility]  Patient aware of importance of FU MD appointments as scheduled   Patient aware to call MD with questions or concerns

## 2018-05-15 NOTE — ASSESSMENT & PLAN NOTE
Likely 2/2 cardiac arrhythmia  Cardiology consulted and felt the patient may have had a post-conversion pause and recommend an implantation of a loop recorder as an outpatient  He will also need to follow up with Cardiology as an outpatient     CTA head/neck reviewed

## 2018-05-15 NOTE — ASSESSMENT & PLAN NOTE
-Sustained during fall at home  -No associated facial bone fracture, or severe bleeding  -Head CT- No acute intracranial abnormality   -No visual disturbances  -the patient was instructed to follow up with PCP within 1 week

## 2018-05-15 NOTE — NURSING NOTE
Patients monitor reading sinus Rah CABRERA aware of the same   Patient plan of care reviewed and updated

## 2018-05-15 NOTE — PROGRESS NOTES
Cardiology Progress Note - Freedom Paalfox 53 y o  male MRN: [id number]    Unit/Bed#:  Encounter: [id number]      Assessment:  1  Atrial fibrillation - presumably new onset - [id number]- VASc = 0  2  Tobacco abuse   3  Syncope  4  Incomplete right bundle branch block    Plan:  1  For AGNIESZKA - CV today   2  Change metoprolol to succinate 50 mg upon discharge  3  Eliquis for one month post-procedure  4  Dicussed ILR to which patient agreeable given syncope - can be arranged as outpatient  5   Echo noted and within normal limits  6  Patient can be discharged post-procedure - follow-up with me scheduled in early June     Subjective:   Patient seen and examined  No significant events overnight  Objective:     Vitals: Blood pressure 118/74, pulse 85, temperature 97 6 °F (36 4 °C), temperature source Temporal, resp  rate 22, height 5' 11" (1 803 m), weight 70 2 kg (154 lb 12 2 oz), SpO2 97 %  , Body mass index is 21 59 kg/m² , Orthostatic Blood Pressures      Most Recent Value   Blood Pressure  118/74 filed at 05/15/2018 0704   Patient Position - Orthostatic VS  Lying filed at 05/15/2018 3259            Intake/Output Summary (Last 24 hours) at 05/15/18 0802  Last data filed at 05/15/18 6441   Gross per 24 hour   Intake          2242 91 ml   Output             3100 ml   Net          -857 09 ml           Physical Exam:    GEN: Freedom Palafox appears well, alert and oriented x 3, pleasant and cooperative   HEENT: pupils equal, round, and reactive to light; extraocular muscles intact  NECK: supple, no carotid bruits   HEART: irregularly irregular rhythm, variable S1 and S2, no murmurs  LUNGS: clear to auscultation bilaterally; no wheezes, rales, or rhonchi   ABDOMEN: normal bowel sounds, soft, no tenderness, no distention  EXTREMITIES: peripheral pulses normal; no clubbing, cyanosis, or edema  NEURO: no focal findings   SKIN: multiple ecchymotic areas over right face and chin     Medications:      Current Facility-Administered Medications:     acetaminophen (TYLENOL) tablet 650 mg, 650 mg, Oral, Q6H PRN, Pepe Gaytan PA-C, 650 mg at 05/12/18 1532    apixaban (ELIQUIS) tablet 5 mg, 5 mg, Oral, BID, Deana Vaughn, DO, 5 mg at 05/15/18 0719    aspirin chewable tablet 81 mg, 81 mg, Oral, Daily, Denita Kahn PA-C, 81 mg at 05/15/18 5067    lactated ringers infusion, 125 mL/hr, Intravenous, Continuous, Kalani Rosaleswood, CRNA, Last Rate: 125 mL/hr at 05/15/18 0654, 125 mL/hr at 05/15/18 0654    metoprolol tartrate (LOPRESSOR) tablet 25 mg, 25 mg, Oral, Q12H [address], Radha Sun MD, 25 mg at 05/15/18 0718    morphine injection 1 mg, 1 mg, Intravenous, Q4H PRN, Pepe Gaytan PA-C    nicotine (NICODERM CQ) 21 mg/24 hr TD 24 hr patch 1 patch, 1 patch, Transdermal, Daily, Pepe Gaytan PA-C    ondansetron (ZOFRAN) injection 4 mg, 4 mg, Intravenous, Q6H PRN, Pepe Gaytan PA-C    oxyCODONE (ROXICODONE) IR tablet 5 mg, 5 mg, Oral, Q4H PRN, Radha Sun MD, 5 mg at 05/14/18 0707     Labs & Results:      Results from last 7 days  Lab Units 05/11/18  1759   TROPONIN I ng/mL <0 02       Results from last 7 days  Lab Units 05/15/18  0525 05/14/18  0519 05/13/18  0530   WBC Thousand/uL 9 16 8 15 7 17   HEMOGLOBIN g/dL 15 4 14 6 13 4   HEMATOCRIT % 42 5 40 8 37 8   PLATELETS Thousands/uL 227 211 176           Results from last 7 days  Lab Units 05/15/18  0525 05/13/18  0530 05/12/18  0508   SODIUM mmol/L 139 140 141   POTASSIUM mmol/L 4 1 4 4 4 1   CHLORIDE mmol/L 103 108 108   CO2 mmol/L 27 25 24   BUN mg/dL 12 10 11   CREATININE mg/dL 0 82 0 78 0 83   CALCIUM mg/dL 8 6 7 6* 8 1*   TOTAL PROTEIN g/dL  --   --  5 7*   BILIRUBIN TOTAL mg/dL  --   --  0 30   ALK PHOS U/L  --   --  75   ALT U/L  --   --  25   AST U/L  --   --  15   GLUCOSE RANDOM mg/dL 98 94 117       Results from last 7 days  Lab Units 05/12/18  0508 05/11/18  1759   INR  1 10 1 06   PTT seconds 28 26       Results from last 7 days  Lab Units 05/12/18  0508 05/11/18  5672 MAGNESIUM mg/dL 1 9 1 9         Counseling / Coordination of Care  Total floor / unit time spent today 20 minutes  Greater than 50% of total time was spent with the patient and / or family counseling and / or coordination of care

## 2018-05-15 NOTE — NURSING NOTE
Patient received from PACU back to his room 205  Patient alert and oriented in no acute distress  Sinus rhythm noted on monitor  Patient offers no complaints

## 2018-05-15 NOTE — PROCEDURES
Procedure: AGNIESZKA/ DCCV    Indication:  Atrial fibrillation     :  Francisco Rossi DO    Fellow: None    Anti-coagulation:  Eliquis 5 mg on AM of procedure     Anesthesia: Conscious sedation provided by anesthesiology with Propofol    AGNIESZKA findings:  No left atrial or left atrial appendage thrombus    Electrical Pad Placement: Anteroposteriorly interscapular region and upper sternum; Successful cardioversion following a single synchronized biphasic shock at 496 J    Complications: None    Post Procedure Findings: Sinus rhythm documented by 12 lead ECG      Disposition: Patient to PACU post-procedure

## 2018-05-15 NOTE — ASSESSMENT & PLAN NOTE
Patient was asymptomatic and Xray of neck soft tissue was negative for acute findings  No additional follow up needed per ENT

## 2018-05-15 NOTE — ANESTHESIA PREPROCEDURE EVALUATION
Review of Systems/Medical History          Cardiovascular  Dysrhythmias , atrial fibrillation,   Comment: Syncope thought secondary to arrythmia,  Pulmonary       GI/Hepatic            Endo/Other     GYN       Hematology   Musculoskeletal       Neurology    Headaches,    Psychology           Physical Exam    Airway    Mallampati score: II         Dental   No notable dental hx     Cardiovascular      Pulmonary      Other Findings        Anesthesia Plan  ASA Score- 3     Anesthesia Type- IV sedation with anesthesia with ASA Monitors  Additional Monitors:   Airway Plan:     Comment: I, Dr Margaux Heredia, the attending physician, have personally seen and evaluated the patient prior to anesthetic care  I have reviewed the pre-anesthetic record, and other medical records if appropriate to the anesthetic care  If a CRNA is involved in the case, I have reviewed the CRNA assessment, if present, and agree  The patient is in a suitable condition to proceed with my formulated anesthetic plan        Plan Factors-    Induction- intravenous  Postoperative Plan-     Informed Consent- Anesthetic plan and risks discussed with patient  I personally reviewed this patient with the CRNA  Discussed and agreed on the Anesthesia Plan with the CRNA  Sun Mathias

## 2018-05-15 NOTE — ASSESSMENT & PLAN NOTE
The patient had complaints of left shoulder pain after fall  Imaging was negative and the patient's pain had resolved

## 2018-05-16 LAB
ATRIAL RATE: 121 BPM
P AXIS: 106 DEGREES
PR INTERVAL: 162 MS
QRS AXIS: 72 DEGREES
QRSD INTERVAL: 94 MS
QT INTERVAL: 298 MS
QTC INTERVAL: 423 MS
T WAVE AXIS: 96 DEGREES
VENTRICULAR RATE: 121 BPM
VWF:RCO ACT/NOR PPP PL AGG: 143 % (ref 50–200)

## 2018-05-16 PROCEDURE — 93010 ELECTROCARDIOGRAM REPORT: CPT | Performed by: INTERNAL MEDICINE

## 2018-05-17 LAB — FACT XIIIA PPP-ACNC: 139 % (ref 57–163)

## 2018-05-19 ENCOUNTER — TRANSITIONAL CARE MANAGEMENT (OUTPATIENT)
Dept: INTERNAL MEDICINE CLINIC | Facility: CLINIC | Age: 48
End: 2018-05-19

## 2018-05-19 ENCOUNTER — OFFICE VISIT (OUTPATIENT)
Dept: INTERNAL MEDICINE CLINIC | Facility: CLINIC | Age: 48
End: 2018-05-19
Payer: COMMERCIAL

## 2018-05-19 VITALS
WEIGHT: 160 LBS | BODY MASS INDEX: 22.4 KG/M2 | TEMPERATURE: 98.2 F | OXYGEN SATURATION: 98 % | HEIGHT: 71 IN | HEART RATE: 100 BPM | SYSTOLIC BLOOD PRESSURE: 98 MMHG | DIASTOLIC BLOOD PRESSURE: 60 MMHG | RESPIRATION RATE: 16 BRPM

## 2018-05-19 DIAGNOSIS — F41.9 ANXIETY: ICD-10-CM

## 2018-05-19 DIAGNOSIS — R55 SYNCOPE AND COLLAPSE: ICD-10-CM

## 2018-05-19 DIAGNOSIS — I48.91 ATRIAL FIBRILLATION WITH RVR (HCC): Primary | ICD-10-CM

## 2018-05-19 DIAGNOSIS — S01.81XS FACIAL LACERATION, SEQUELA: ICD-10-CM

## 2018-05-19 LAB — FACT VIII AG ACT/NOR PPP IA: 191 %

## 2018-05-19 PROCEDURE — 1111F DSCHRG MED/CURRENT MED MERGE: CPT | Performed by: NURSE PRACTITIONER

## 2018-05-19 PROCEDURE — 99496 TRANSJ CARE MGMT HIGH F2F 7D: CPT | Performed by: NURSE PRACTITIONER

## 2018-05-19 RX ORDER — ALPRAZOLAM 0.25 MG/1
0.25 TABLET ORAL
Qty: 30 TABLET | Refills: 0 | Status: SHIPPED | OUTPATIENT
Start: 2018-05-19 | End: 2018-06-13

## 2018-05-19 RX ORDER — ALPRAZOLAM 0.25 MG/1
1 TABLET ORAL
COMMUNITY
Start: 2016-01-06 | End: 2018-05-19 | Stop reason: SDUPTHER

## 2018-05-19 NOTE — PATIENT INSTRUCTIONS
A-fib (Atrial Fibrillation)   WHAT YOU NEED TO KNOW:   What is atrial fibrillation (a-fib)? A-fib is an irregular heartbeat  It reduces your heart's ability to pump blood through your body  A-fib may come and go, or it may be a long-term condition  A-fib can cause life-threatening blood clots, stroke, or heart failure  It is important to treat and manage a-fib to help prevent these problems  What increases my risk for a-fib? · High blood pressure, heart failure, or heart valve disease    · Smoking    · COPD, sleep apnea, a blood clot in your lung, or other lung diseases     · Diabetes, obesity, or thyroid disease    · Heavy alcohol use or large amounts of caffeine    · Age 72 years or older     · A family history of a-fib or other heart problems  What are the signs and symptoms of a-fib? · A heartbeat that races, pounds, or flutters    · Weakness, severe tiredness, or confusion    · Feeling lightheaded, sweaty, dizzy, or faint    · Shortness of breath or anxiety    · Chest pain or pressure  How is a-fib diagnosed? Your healthcare provider will examine you  Tell the provider about your symptoms, health conditions, and medicines  Tell the provider if you drink alcohol, smoke cigarettes, or use any illegal drugs  You will need an EKG to check your heart rhythm and how fast your heart beats  You may also need to wear a Holter monitor at home while you do your usual activities  The Holter monitor is a portable EKG machine  How is a-fib treated? Conditions that cause a-fib, such as thyroid disease, will be treated  You may also need any of the following:  · Heart medicines  help control your heart rate and rhythm  You may need more than one medicine to treat your symptoms  · Antiplatelet and blood thinner medicines  help prevent blood clots  · Cardioversion  is a procedure to return your heart rate and rhythm to normal  It can be done using medicines or electric shock       · A-fib ablation  is a procedure that uses energy to burn a small area of heart tissue  This creates scar tissue and prevents electrical signals that cause a-fib  You may need this procedure more than once  Ask for more information on a-fib ablation  · A pacemaker  may be inserted into your heart  A pacemaker is a device that controls your heartbeat  A pacemaker may be inserted during an ablation procedure or surgery  Ask your healthcare provider for more information on pacemakers  · Surgery  may be needed if other procedures do not work  During surgery your healthcare provider will make cuts in the upper part of your heart  The provider will stitch the cuts together to create scar tissue  The scar tissue will prevent electrical signals that cause a-fib  How can I manage a-fib? · Know your target heart rate  Learn how to take your pulse and monitor your heart rate  · Manage other health conditions  This includes high blood pressure, sleep apnea, thyroid disease, diabetes, and other heart conditions  Take medicine as directed and follow your treatment plan  · Limit or do not drink alcohol  Alcohol can make a-fib hard to manage  Ask your healthcare provider if it is safe for you to drink alcohol  A drink of alcohol is 12 ounces of beer, 5 ounces of wine, or 1½ ounces of liquor  · Do not smoke  Nicotine and other chemicals in cigarettes and cigars can cause heart and lung damage  Ask your healthcare provider for information if you currently smoke and need help to quit  E-cigarettes or smokeless tobacco still contain nicotine  Talk to your healthcare provider before you use these products  · Eat heart-healthy foods  Heart healthy foods will help keep your cholesterol low  These include fruits, vegetables, whole-grain breads, low-fat dairy products, beans, lean meats, and fish  Replace butter and margarine with heart-healthy oils such as olive oil and canola oil  · Maintain a healthy weight    Ask your healthcare provider how much you should weigh  Ask him to help you create a weight loss plan if you are overweight  · Exercise for 30 minutes  most days of the week  Ask your healthcare provider about the best exercise plan for you  Call 911 for any of the following:   · You have any of the following signs of a heart attack:      ¨ Squeezing, pressure, or pain in your chest that lasts longer than 5 minutes or returns    ¨ Discomfort or pain in your back, neck, jaw, stomach, or arm     ¨ Trouble breathing    ¨ Nausea or vomiting    ¨ Lightheadedness or a sudden cold sweat, especially with chest pain or trouble breathing    · You have any of the following signs of a stroke:      ¨ Numbness or drooping on one side of your face     ¨ Weakness in an arm or leg    ¨ Confusion or difficulty speaking    ¨ Dizziness, a severe headache, or vision loss  When should I seek immediate care? You have any of the following signs of a blood clot:  · You feel lightheaded, are short of breath, and have chest pain  · You cough up blood  · You have swelling, redness, pain, or warmth in your arm or leg  When should I contact my healthcare provider? · Your target heart rate is not in the range it should be  · You have new or worsening swelling in your legs, feet, ankles, or abdomen  · You are short of breath, even at rest      · You have questions or concerns about your condition or care  CARE AGREEMENT:   You have the right to help plan your care  Learn about your health condition and how it may be treated  Discuss treatment options with your caregivers to decide what care you want to receive  You always have the right to refuse treatment  The above information is an  only  It is not intended as medical advice for individual conditions or treatments  Talk to your doctor, nurse or pharmacist before following any medical regimen to see if it is safe and effective for you    © 2017 [employer] Information is for End User's use only and may not be sold, redistributed or otherwise used for commercial purposes  All illustrations and images included in CareNotes® are the copyrighted property of A D A M [employer] Inc  or [employer]

## 2018-05-19 NOTE — PROGRESS NOTES
Assessment/Plan:  Patient is following up with Cardiology on June 4th Dr Maryse Lamb  He was advised to take his Eliquis BID rather than once a day per hospital discharge instructions  His VWF did come back negative and his Factor 8 was normal at 139  I feel at this time there is no need to follow up with Hematology  He was instructed to continue to check his Bps and heart rate daily and to continue to keep a daily log  He was advised if any palpitations, lightheadedness, or fluttering in the chest to return to the ER  If any issues or concerns please call the office  No problem-specific Assessment & Plan notes found for this encounter  Problem List Items Addressed This Visit     Atrial fibrillation with RVR ([address] ) - Primary    Syncope and collapse    Facial laceration           Subjective:     Patient ID: Liam Curiel is a 53 y o  male  Flo Castellanos is here today for a hospital follow up visit  He was admitted on 5/11 to 5/5 and noted to be in atrial fibrillation with a heart rate in the 160's requiring a Cardizem gtt  On 5/12/18 the Cardizem gtt was discontinued and he was started on metoprolol 25mg PO BID  Cardiology was consulted and recommended AGNIESZKA-guided cardioversion which was performed on 5/15/18  Cardiology also recommended starting eliquis x 1 month then resuming aspirin  Hematology was consulted given patient stating he may have Herald Darter given family history  Hematology felt it was unlikely that he has Herald Darter after speaking to the patient obtain further history  Labs were drawn to rule out Herald Darter and was pending at discharge  The patient was discharged home on Metoprolol succinate 50 mg PO daily for rate control  He does have an appointment Dr Maryse Lamb in the office  An appointment was made for him for early June 2018    He states he is feeling much better since starting the medication and is taking the Eliquis only once a day and did not realize it was suppose to be twice a day  He is taking his Lopressor every day as well and his mother is an RN and checking his Bps and his pulse rate daily  He denies any chest pain, SOB, or palpitations  He denies any syncopal episodes  He states his left shoulder is still sore but feeling much better and is seeing a dentist due to a cracked tooth after his fall  He did quit smoking and has been nicotine free for 8 days  He offers no other complaints today             Review of Systems   All other systems reviewed and are negative  Objective:     Physical Exam   Constitutional: He appears well-developed and well-nourished  HENT:   Head: Normocephalic and atraumatic  Right Ear: External ear normal    Left Ear: External ear normal    Nose: Nose normal    Mouth/Throat: Oropharynx is clear and moist    Eyes: Conjunctivae and EOM are normal  Pupils are equal, round, and reactive to light  Neck: Normal range of motion  Neck supple  Cardiovascular: Normal rate, regular rhythm, normal heart sounds and intact distal pulses  Pulmonary/Chest: Effort normal and breath sounds normal    Abdominal: Soft  Bowel sounds are normal    Musculoskeletal: Normal range of motion  Neurological: He is alert  He has normal reflexes  Skin: Skin is warm and dry  Psychiatric: He has a normal mood and affect  His behavior is normal  Judgment and thought content normal    Vitals reviewed  There were no vitals filed for this visit  Transitional Care Management Review:  Morteza Roa is a 53 y o  male here for TCM follow up       During the TCM phone call patient stated:    Date and time hospital follow up call was made:  5/17/2018 11:35 AM  Hospital care reviewed:  Records reviewed  Patient was hopsitalized at:  [address] [employer]  Date of admission:  5/11/18  Date of discharge:  5/15/18  Diagnosis:  Syncope and collapse  Disposition:  Home  Were the patients medicaitons reviewed and updated:  Yes  Post hospital issues:  Reduced activity  Should patient be enrolled in anticoag monitoring?:  Yes  Scheduled for follow up?:  Yes  Patients specialists:  Cardiologist  Cardiologist's Name:  Dr Gladys Lewis  Did you obtain your prescribed medications:  Yes  Do you need help managing your perscriptions or medications:  No  Is transportation to your appointments needed:  No  I have advised the patient to call PCP with any new or worsening symptoms (please type in name along with any credentials): Mother made phone call  Not able to answer all questions    Nirav Kaminski  Living Arrangements:  Family members  Support System:  Family  The type of support provided:  Physical, Emotional  Do you have social support:  Yes, some  Are you recieving outpatient services:  No  Are you recieving home care services:  No  Are you using any community resources:  No  Current waiver service:  No  Interperter language line required?:  No  Counseling:  Family  Counseling topics:  Activities of daily living             Rishi Sales, [address]

## 2018-05-19 NOTE — LETTER
May 19, 2018     Patient: Luan Woodruff   YOB: 1970   Date of Visit: 5/19/2018       To Whom it May Concern:    Luan Woodruff is under my professional care  He was seen in my office on 5/19/2018  Please excuse patient from work on May 11- June 4th due to his recent hospitalization  He has to follow up with cardiology to get clearance to go back to work  If you have any questions or concerns, please don't hesitate to call           Sincerely,          RANULFO Burnette        CC: No Recipients

## 2018-05-24 ENCOUNTER — TELEPHONE (OUTPATIENT)
Dept: CARDIOLOGY CLINIC | Facility: CLINIC | Age: 48
End: 2018-05-24

## 2018-05-30 ENCOUNTER — TELEPHONE (OUTPATIENT)
Dept: CARDIOLOGY CLINIC | Facility: CLINIC | Age: 48
End: 2018-05-30

## 2018-05-30 NOTE — TELEPHONE ENCOUNTER
Pt called and requested to speak with you regarding loop recorder implant before scheduling it     Pt has some concerns after reading "bad things" online about the device implant   Pt said he is avail at [phone] or cell [phone]

## 2018-06-04 ENCOUNTER — OFFICE VISIT (OUTPATIENT)
Dept: CARDIOLOGY CLINIC | Facility: HOSPITAL | Age: 48
End: 2018-06-04
Payer: COMMERCIAL

## 2018-06-04 VITALS
DIASTOLIC BLOOD PRESSURE: 86 MMHG | HEIGHT: 72 IN | SYSTOLIC BLOOD PRESSURE: 110 MMHG | WEIGHT: 163 LBS | HEART RATE: 78 BPM | BODY MASS INDEX: 22.08 KG/M2

## 2018-06-04 DIAGNOSIS — I48.91 ATRIAL FIBRILLATION WITH RVR (HCC): Primary | ICD-10-CM

## 2018-06-04 DIAGNOSIS — R55 SYNCOPE AND COLLAPSE: ICD-10-CM

## 2018-06-04 DIAGNOSIS — Z98.890 HISTORY OF CARDIOVERSION: ICD-10-CM

## 2018-06-04 PROBLEM — Z92.89 HISTORY OF CARDIOVERSION: Status: ACTIVE | Noted: 2018-06-04

## 2018-06-04 PROCEDURE — 99214 OFFICE O/P EST MOD 30 MIN: CPT | Performed by: INTERNAL MEDICINE

## 2018-06-04 PROCEDURE — 93000 ELECTROCARDIOGRAM COMPLETE: CPT | Performed by: INTERNAL MEDICINE

## 2018-06-04 RX ORDER — METOPROLOL SUCCINATE 50 MG/1
50 TABLET, EXTENDED RELEASE ORAL DAILY
Qty: 30 TABLET | Refills: 11 | Status: SHIPPED | OUTPATIENT
Start: 2018-06-04 | End: 2019-06-30 | Stop reason: SDUPTHER

## 2018-06-04 NOTE — TELEPHONE ENCOUNTER
I called and left a message last week  I am supposed to see him in the office today as well      Nanda Nichols

## 2018-06-04 NOTE — PROGRESS NOTES
Cardiology Follow Up    Lisa Knox  1970  [id number]  [address] [facility] [address] [id number]    1  Atrial fibrillation with RVR (HCC)  POCT ECG   2  History of cardioversion  POCT ECG   3  Syncope and collapse         Discussion/Summary:  Mr Albertina Olmos is a pleasant 50-year-old gentleman who presents to the office today for hospital follow-up  I had met him when he was admitted with rapid atrial fibrillation and syncope  He underwent a AGNIESZKA-guided cardioversion was placed on an AV shea blocking regimen and oral anticoagulation with Eliquis  He continues to maintain normal sinus rhythm  He will remain on his current AV shea blocking regimen  He will complete one month of anticoagulation with Eliquis given his [id number] score of 0  We did again discuss implantation of a loop recorder given his atrial fibrillation and syncope  At this point he declines  He was reminded that he should not drive for six months  He did quit smoking since hospital discharge  He was congratulated and encouraged to remain tobacco free  I will see him back in the office in three months for ongoing evaluation  Interval History:   Mr Albertina Olmos is a 50-year-old gentleman who presents to the office today for hospital follow-up  I had met him when he was initially admitted to [address] [place] [address] with a syncopal episode  He states he was doing yard work and went to get a hose  The next thing he remembers is waking up on the ground  He did have facial trauma  He was noted to be in atrial fibrillation with rapid ventricular response upon arrival to the ED  He was placed on an AV shea blocking regimen and eventually underwent a AGNIESZKA-guided cardioversion after he was placed on Eliquis which was to be continued for one month post-procedure    Because of the syncopal episode it was recommended that he undergo implantation of a loop recorder  Since discharge he has been feeling well  He is active  With the activity he is able to perform he denies any exertional chest pain or shortness of breath  He denies any signs or symptoms of congestive heart failure including increasing lower extremity edema, paroxysmal nocturnal dyspnea, orthopnea, acute weight gain or increasing abdominal girth  He denies palpitation or any sensation of recurrent atrial fibrillation  He denies lightheadedness, syncope or presyncope  He denies symptoms of claudication  He denies any bleeding consequences on Eliquis  Problem List     Atrial fibrillation with RVR (HCC)    Syncope and collapse    Smoker    Acute pain of left shoulder    Facial laceration    Hypokalemia    Leukocytosis    Foreign body in pharynx    Fall at home, initial encounter        Past Medical History:   Diagnosis Date    H/O von Willebrand's disease     Migraines     Wrist fracture     left     Social History     Social History    Marital status: /Civil Union     Spouse name: N/A    Number of children: N/A    Years of education: N/A     Occupational History    Not on file       Social History Main Topics    Smoking status: Former Smoker     Packs/day: 1 00     Types: Cigarettes     Quit date: 5/11/2018    Smokeless tobacco: Never Used    Alcohol use Yes      Comment: occasional    Drug use: No    Sexual activity: Not on file     Other Topics Concern    Not on file     Social History Narrative    No narrative on file      Family History   Problem Relation Age of Onset    Breast cancer Mother     Heart attack Father      Past Surgical History:   Procedure Laterality Date    NM CARDIOVERSION ELECTIVE ARRHYTHMIA EXTERNAL N/A 5/15/2018    Procedure: CARDIOVERSION;  Surgeon: Benjamin Everett DO;  Location: [facility];  Service: Cardiology    NM ECHO TRANSESOPHAG R-T 2D W/PRB IMG ACQUISJ I&R N/A 5/15/2018    Procedure: TRANSESOPHAGEAL ECHOCARDIOGRAM (AGNIESZKA); Surgeon: Thuan Martinez DO;  Location: [facility];  Service: Cardiology    WISDOM TOOTH EXTRACTION         Current Outpatient Prescriptions:     ALPRAZolam (XANAX) 0 25 mg tablet, Take 1 tablet (0 25 mg total) by mouth daily at bedtime as needed for anxiety, Disp: 30 tablet, Rfl: 0    apixaban (ELIQUIS) 5 mg, Take 1 tablet (5 mg total) by mouth 2 (two) times a day, Disp: 30 tablet, Rfl: 0    metoprolol succinate (TOPROL-XL) 100 mg 24 hr tablet, Take 0 5 tablets (50 mg total) by mouth daily, Disp: 30 tablet, Rfl: 0  No Known Allergies    Labs:     Chemistry        Component Value Date/Time     05/15/2018 0525    K 4 1 05/15/2018 0525     05/15/2018 0525    CO2 27 05/15/2018 0525    BUN 12 05/15/2018 0525    CREATININE 0 82 05/15/2018 0525        Component Value Date/Time    CALCIUM 8 6 05/15/2018 0525    ALKPHOS 75 05/12/2018 0508    AST 15 05/12/2018 0508    ALT 25 05/12/2018 0508    BILITOT 0 30 05/12/2018 0508            Lab Results   Component Value Date    CHOL 169 01/15/2016     Lab Results   Component Value Date    HDL 42 01/15/2016     Lab Results   Component Value Date    LDLCALC 113 (H) 01/15/2016     Lab Results   Component Value Date    TRIG 72 01/15/2016     No components found for: CHOLHDL    Imaging: Cta Head And Neck W Wo Contrast    Result Date: 5/13/2018  Narrative: CTA NECK AND BRAIN WITH AND WITHOUT CONTRAST INDICATION: syncope and collapse COMPARISON:   5/11/2018 head CT TECHNIQUE:  Routine CT imaging of the Brain without contrast   Post contrast imaging was performed after administration of iodinated contrast through the neck and brain  Post contrast axial 0 625 mm images timed to opacify the arterial system  3D rendering was performed on an independent workstation  MIP reconstructions performed  Coronal reconstructions were performed of the noncontrast portion of the brain    Radiation dose length product (DLP) for this visit:  9691 2587 mGy-cm   This examination, like all CT scans performed in the [facility], was performed utilizing techniques to minimize radiation dose exposure, including the use of iterative reconstruction and automated exposure control  IV Contrast:  100 mL of iohexol (OMNIPAQUE)  IMAGE QUALITY:   Diagnostic FINDINGS: NONCONTRAST BRAIN PARENCHYMA:  No intracranial mass, mass effect or midline shift  No acute intracranial hemorrhage  No CT signs of acute infarction  VENTRICLES AND EXTRA-AXIAL SPACES:  Normal for patient's age  VISUALIZED ORBITS AND PARANASAL SINUSES:  Unremarkable  CALVARIUM AND EXTRACRANIAL SOFT TISSUES:   Normal  CERVICAL VASCULATURE AORTIC ARCH AND GREAT VESSELS:  Normal aortic arch and great vessel origins  Normal visualized subclavian vessels  RIGHT VERTEBRAL ARTERY CERVICAL SEGMENT:  Normal origin  The vessel is normal in caliber throughout the neck  LEFT VERTEBRAL ARTERY CERVICAL SEGMENT:  Normal origin  The vessel is normal in caliber throughout the neck  RIGHT EXTRACRANIAL CAROTID SEGMENT:  Normal caliber common carotid artery  Normal bifurcation and cervical internal carotid artery  No stenosis or dissection  LEFT EXTRACRANIAL CAROTID SEGMENT:  Normal caliber common carotid artery  Normal bifurcation and cervical internal carotid artery  No stenosis or dissection  NASCET criteria was used to determine the degree of internal carotid artery diameter stenosis  INTRACRANIAL VASCULATURE INTERNAL CAROTID ARTERIES:  Normal enhancement of the intracranial portions of the internal carotid arteries  Normal ophthalmic artery origins  Normal ICA terminus  ANTERIOR CIRCULATION:  Symmetric A1 segments and anterior cerebral arteries with normal enhancement  Normal anterior communicating artery  MIDDLE CEREBRAL ARTERY CIRCULATION:  M1 segment and middle cerebral artery branches demonstrate normal enhancement bilaterally   DISTAL VERTEBRAL ARTERIES:  Normal distal vertebral arteries  Posterior inferior cerebellar artery origins are normal  Normal vertebral basilar junction  BASILAR ARTERY:  Basilar artery is normal in caliber  Normal superior cerebellar arteries  POSTERIOR CEREBRAL ARTERIES: Both posterior cerebral arteries arises from the basilar tip  Both arteries demonstrate normal enhancement  Normal posterior communicating arteries  DURAL VENOUS SINUSES:  Normal  NON VASCULAR ANATOMY BONY STRUCTURES:  No acute osseous abnormality  SOFT TISSUES OF THE NECK:  Unremarkable  THORACIC INLET:  Unremarkable  Impression: No significant brain parenchymal abnormalities, consistent with prior study No acute intracranial hemorrhage or mass effect No evidence of significant stenosis, dissection or occlusion involving cervical carotid or vertebral segments or visualized cerebral arteries Workstation performed: [id number]     Xr Neck Soft Tissue    Result Date: 5/13/2018  Narrative: NECK  SOFT TISSUE EXAMINATION INDICATION:   Foreign body  COMPARISON:  None VIEWS:  XR NECK SOFT TISSUE FINDINGS: The epiglottis and aryepiglottic folds are unremarkable in appearance  No radiopaque foreign bodies are appreciated  There are no osseous abnormalities  Impression: Unremarkable study  Workstation performed: [id number]     Xr Chest 1 View Portable    Result Date: 5/11/2018  Narrative: CHEST INDICATION:   Trauma  COMPARISON:  None EXAM PERFORMED/VIEWS:  XR CHEST PORTABLE  AP supine portable FINDINGS: Questionable superior mediastinal widening which could be from brachiocephalic vessels/positioning, follow-up with repeat PA and lateral chest x-rays  The lungs are clear  No pleural effusion  Osseous structures appear within normal limits for patient age  Impression: Questionable superior mediastinal widening which could be from brachiocephalic vessels/positioning, follow-up with repeat PA and lateral chest x-rays   Workstation performed: [id number]     Xr Chest 2 Views    Result Date: 5/11/2018  Narrative: CHEST INDICATION: Trauma, follow-up COMPARISON:  Previous exam same date EXAM PERFORMED/VIEWS:  XR CHEST PA & LATERAL FINDINGS: Persistent somewhat oblique right paratracheal density possibly representing brachiocephalic vessels  Adenopathy could appear similarly  Vascular injury considered less likely  Normal cardiac silhouette  The lungs are clear  No pneumothorax or pleural effusion  Osseous structures appear within normal limits for patient age  Impression: Persistent right paratracheal density suspicious for brachiocephalic vessels or adenopathy  Vascular injury considered less likely Workstation performed: [id number]     Xr Shoulder 2+ Views Left    Result Date: 5/11/2018  Narrative: LEFT SHOULDER INDICATION:   trauma  COMPARISON:  None VIEWS:  XR SHOULDER 2+ VW LEFT FINDINGS: There is no acute fracture or dislocation  No significant degenerative changes  No lytic or blastic lesions are seen  Soft tissues are unremarkable  Impression: No acute osseous abnormality  Workstation performed: [id number]     Ct Head Wo Contrast    Result Date: 5/11/2018  Narrative: CT BRAIN - WITHOUT CONTRAST INDICATION:   trauma  COMPARISON:  None  TECHNIQUE:  CT examination of the brain was performed  In addition to axial images, coronal 2D reformatted images were created and submitted for interpretation  Radiation dose length product (DLP) for this visit:  1077 mGy-cm   This examination, like all CT scans performed in the [facility], was performed utilizing techniques to minimize radiation dose exposure, including the use of iterative reconstruction and automated exposure control  IMAGE QUALITY:  Diagnostic  FINDINGS: PARENCHYMA:  No intracranial mass, mass effect or midline shift  No CT signs of acute infarction  No acute intracranial hemorrhage  VENTRICLES AND EXTRA-AXIAL SPACES:  Normal for the patient's age  VISUALIZED ORBITS AND PARANASAL SINUSES:  Unremarkable  CALVARIUM AND EXTRACRANIAL SOFT TISSUES:  Normal      Impression: No acute intracranial abnormality  Workstation performed: [id number]     Ct Facial Bones Wo Contrast    Result Date: 5/11/2018  Narrative: CT FACIAL BONES WITHOUT INTRAVENOUS CONTRAST INDICATION:   facial trauma  COMPARISON: None  TECHNIQUE:  Axial CT images were obtained through the facial bones with additional sagittal and coronal reconstructions  Radiation dose length product (DLP) for this visit:  358 mGy-cm   This examination, like all CT scans performed in the [facility], was performed utilizing techniques to minimize radiation dose exposure, including the use of iterative reconstruction and automated exposure control  IMAGE QUALITY:  Diagnostic  FINDINGS: FACIAL BONES:   No facial bone fracture identified  Normal alignment of the temporomandibular joints  No lytic or blastic lesion  ORBITS:  Orbital globes, optic nerves, and extraocular muscles appear symmetric and normal  There is no evidence of retrobulbar mass, abscess, or hematoma  SINUSES:  Normal  SOFT TISSUES:  There is soft tissue swelling at the anterior right mandibular region  Impression: No facial bone fracture or subluxation  Workstation performed: [id number]     Ct Spine Cervical Wo Contrast    Result Date: 5/11/2018  Narrative: CT CERVICAL SPINE - WITHOUT CONTRAST INDICATION:   trauma  COMPARISON: None  TECHNIQUE:  CT examination of the cervical spine was performed without intravenous contrast   Contiguous axial images were obtained  Sagittal and coronal reconstructions were performed  Radiation dose length product (DLP) for this visit:  529 mGy-cm   This examination, like all CT scans performed in the [facility], was performed utilizing techniques to minimize radiation dose exposure, including the use of iterative reconstruction and automated exposure control  IMAGE QUALITY:  Diagnostic   FINDINGS: ALIGNMENT:  Normal alignment of the cervical spine  No subluxation  VERTEBRAL BODIES:  No fracture  There are subcentimeter sclerotic densities noted at the left aspect of C1 and C4 likely representing bone islands  DEGENERATIVE CHANGES:  No significant cervical degenerative changes are noted  PREVERTEBRAL AND PARASPINAL SOFT TISSUES:  There is inspissated material noted at the dependent portion of the hypopharynx with an approximately 7 x 3 mm metallic density noted at the posterior midline the level of the piriform sinuses  THORACIC INLET:  Normal      Impression: No cervical spine fracture or traumatic malalignment  Subcentimeter metallic density noted within the dependent portion of the hypopharynx at the level of the piriform sinuses  The findings may be related to an aspirated dental amalgam   ENT consultation is suggested  I personally discussed this study with Vanda Sheppard 5/11/2018 at 7:02 PM   Workstation performed: [id number]     Cta Dissection Protocol Chest And Abdomen    Result Date: 5/11/2018  Narrative: CTA - CHEST AND ABDOMEN  - WITHOUT AND WITH IV CONTRAST INDICATION:   Chest pain  COMPARISON:  None  TECHNIQUE: CT examination of the chest and abdomen was performed both prior to and after the administration of intravenous contrast   Thin section angiographic arterial phase post contrast technique was used in order to evaluate for aortic dissection  3D reformatted images and volume rendering were performed on an independent workstation  Additionally, axial, sagittal, and coronal 2D reformatted images were created from the source data and submitted for interpretation  Radiation dose length product (DLP) for this visit:  1033 mGy-cm   This examination, like all CT scans performed in the [facility], was performed utilizing techniques to minimize radiation dose exposure, including the use of iterative reconstruction and automated exposure control   IV Contrast:  100 mL of iohexol (OMNIPAQUE) Enteric Contrast: Enteric contrast was not administered  FINDINGS: AORTA: There is no aortic dissection or intramural hematoma  There is no aortic aneurysm  There is a prominent ductus bump/outpouching at the ligamentum arteriosum, likely congenital   Superior indentation on the celiac axis best seen on the sagittal view 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 to median arcuate ligament syndrome  CHEST LUNGS:  Bibasilar subsegmental atelectasis  PLEURA:  Unremarkable  HEART/PULMONARY ARTERIAL TREE:  Heart is unremarkable for the patient's age  Within the limitations of this examination there is no evidence of pulmonary embolus  MEDIASTINUM AND HORACIO:  Unremarkable  CHEST WALL AND LOWER NECK:   Unremarkable  ABDOMEN LIVER/BILIARY TREE:  Mild hepatic steatosis and hepatomegaly  GALLBLADDER:  No calcified gallstones  No pericholecystic inflammatory change  SPLEEN:  Unremarkable  PANCREAS:  Unremarkable  ADRENAL GLANDS:  Unremarkable  KIDNEYS/URETERS:  Unremarkable  No hydronephrosis  VISUALIZED STOMACH AND BOWEL:  Mildly thickened proximal transverse colon likely from underdistention rather than colitis  VISUALIZED ABDOMINOPELVIC CAVITY:  No ascites or free intraperitoneal air  No lymphadenopathy  ABDOMINAL WALL:  Unremarkable  OSSEOUS STRUCTURES:  No acute fracture or destructive osseous lesion  Impression: 1  No evidence of aneurysm or dissection  2   Clear lungs  3   Mild hepatic steatosis and hepatomegaly  4   Superior indentation on the celiac axis best seen on the sagittal view 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 to median arcuate ligament syndrome  Workstation performed: [id number]       ECG:  Normal sinus rhythm with sinus arrhythmia, otherwise normal ECG      Review of Systems   Cardiovascular: Negative for chest pain, claudication, cyanosis, dyspnea on exertion, irregular heartbeat, leg swelling, near-syncope, orthopnea, palpitations, paroxysmal nocturnal dyspnea and syncope  All other systems reviewed and are negative        Vitals:    06/04/18 1225   BP: 110/86 Pulse: 78     Vitals:    06/04/18 1225   Weight: 73 9 kg (163 lb)     Height: 6' (182 9 cm)   Body mass index is 22 11 kg/m²      Physical Exam:   General appearance:  Appears stated age, alert, well appearing and in no distress  HEENT:  PERRLA, EOMI, no scleral icterus, no conjunctival pallor  NECK:  Supple, No elevated JVP, no thyromegaly, no carotid bruits  HEART:  Regular rate and rhythm, normal S1/S2, no S3/S4, no murmur or rub  LUNGS:  Clear to auscultation bilaterally  ABDOMEN:  Soft, non-tender, positive bowel sounds, no rebound or guarding, no organomegaly   EXTREMITIES:  No edema  VASCULAR:  Normal pedal pulses   SKIN: No lesions or rashes on exposed skin  NEURO:  CN II-XII intact, no focal deficits

## 2018-06-13 ENCOUNTER — OFFICE VISIT (OUTPATIENT)
Dept: INTERNAL MEDICINE CLINIC | Facility: CLINIC | Age: 48
End: 2018-06-13
Payer: COMMERCIAL

## 2018-06-13 VITALS
HEIGHT: 72 IN | HEART RATE: 68 BPM | BODY MASS INDEX: 22.27 KG/M2 | DIASTOLIC BLOOD PRESSURE: 76 MMHG | TEMPERATURE: 98.6 F | SYSTOLIC BLOOD PRESSURE: 120 MMHG | OXYGEN SATURATION: 98 % | WEIGHT: 164.4 LBS

## 2018-06-13 DIAGNOSIS — M25.512 ACUTE PAIN OF LEFT SHOULDER: ICD-10-CM

## 2018-06-13 DIAGNOSIS — R55 SYNCOPE AND COLLAPSE: ICD-10-CM

## 2018-06-13 DIAGNOSIS — Z13.6 SCREENING FOR CARDIOVASCULAR CONDITION: ICD-10-CM

## 2018-06-13 DIAGNOSIS — I48.91 ATRIAL FIBRILLATION WITH RVR (HCC): Primary | ICD-10-CM

## 2018-06-13 DIAGNOSIS — Z98.890 HISTORY OF CARDIOVERSION: ICD-10-CM

## 2018-06-13 PROCEDURE — 99214 OFFICE O/P EST MOD 30 MIN: CPT | Performed by: NURSE PRACTITIONER

## 2018-06-13 NOTE — PROGRESS NOTES
Assessment/Plan: Patient is doing well and will be taking his last dose of Eliquis tomorrow and will be starting on ASA  He is taking Toprol 50 mg daily and his BP is stable at 120/76 with a HR of 68  He was advised if any chest pain, SOB, or dizziness to go to ER  Will follow up with him in 6 months with fasting labs  Patient will notify me if he is willing to start on a muscle relaxer and would like an MRI done  If any issues or concerns please call the office  No problem-specific Assessment & Plan notes found for this encounter  Problem List Items Addressed This Visit     Atrial fibrillation with RVR ([address] ) - Primary    Relevant Orders    Comprehensive metabolic panel    CBC and differential    TSH, 3rd generation with T4 reflex    Syncope and collapse    Relevant Orders    Comprehensive metabolic panel    CBC and differential    TSH, 3rd generation with T4 reflex    History of cardioversion    Relevant Orders    Comprehensive metabolic panel    CBC and differential    TSH, 3rd generation with T4 reflex      Other Visit Diagnoses     Screening for cardiovascular condition        Relevant Orders    Lipid panel            Subjective:      Patient ID: Mirna Villanueva is a 53 y o  male  [facility] is here today for a follow up visit  He was seen by Cardiology on the 4th regarding his new onset of A fib  He underwent a AGNIESZKA-guided cardioversion was placed on an AV shea blocking regimen and oral anticoagulation with Eliquis  He continues to maintain normal sinus rhythm  He will remain on his current AV shea blocking regimen  He will complete one month of anticoagulation with Eliquis given his [id number] score of 0  It was discussed with Cardiology  again discuss implantation of a loop recorder given his atrial fibrillation and syncope  At this point he declines  He was still encouraged not to drive for 6 months  He is doing well today and states since he stopped smoking he feels wonderful    He is starting to taste and smell better and has not desire to smoke again  He does follow back up with Cardiology again in around 6 months  He denies any chest pain, SOB, or palpitations  He is checking his BP and Hrs at home and they have been stable  He is having left shoulder pain and feels this started after his fall  He has been using muscle creams and ice/heat which has been helping his symptoms  He is leary about taking muscle relaxers and will talk to his mom about this due to her being an RN  He states the pain happens when he lifts his arm and will hurt in the left shoulder blade  He denies any numbness and tingling in his arm or hand  He offers no other complaints  The following portions of the patient's history were reviewed and updated as appropriate:   He  has a past medical history of H/O von Willebrand's disease; Migraines; and Wrist fracture  He   Patient Active Problem List    Diagnosis Date Noted    History of cardioversion 06/04/2018    Atrial fibrillation with RVR ([facility] [address] ) 05/11/2018    Fall at home, initial encounter 05/11/2018    Syncope and collapse 05/11/2018    Smoker 05/11/2018    Acute pain of left shoulder 05/11/2018    Facial laceration 05/11/2018    Hypokalemia 05/11/2018    Leukocytosis 05/11/2018    Foreign body in pharynx 05/11/2018     He  has a past surgical history that includes [place] tooth extraction; pr cardioversion elective arrhythmia external (N/A, 5/15/2018); and pr echo transesophag r-t 2d w/prb img acquisj i&r (N/A, 5/15/2018)  His family history includes Breast cancer in his mother; Heart attack in his father  He  reports that he quit smoking about 4 weeks ago  His smoking use included Cigarettes  He smoked 1 00 pack per day  He has never used smokeless tobacco  He reports that he drinks alcohol  He reports that he does not use drugs    Current Outpatient Prescriptions   Medication Sig Dispense Refill    apixaban (ELIQUIS) 5 mg Take 1 tablet (5 mg total) by mouth 2 (two) times a day 30 tablet 0    metoprolol succinate (TOPROL-XL) 50 mg 24 hr tablet Take 1 tablet (50 mg total) by mouth daily 30 tablet 11     No current facility-administered medications for this visit  Current Outpatient Prescriptions on File Prior to Visit   Medication Sig    apixaban (ELIQUIS) 5 mg Take 1 tablet (5 mg total) by mouth 2 (two) times a day    metoprolol succinate (TOPROL-XL) 50 mg 24 hr tablet Take 1 tablet (50 mg total) by mouth daily    [DISCONTINUED] ALPRAZolam (XANAX) 0 25 mg tablet Take 1 tablet (0 25 mg total) by mouth daily at bedtime as needed for anxiety     No current facility-administered medications on file prior to visit  He has No Known Allergies       Review of Systems   Constitutional: Negative  HENT: Negative  Eyes: Negative  Respiratory: Negative  Cardiovascular: Negative  Gastrointestinal: Negative  Endocrine: Negative  Genitourinary: Negative  Musculoskeletal:        Left shoulder pain   Skin: Negative  Allergic/Immunologic: Negative  Neurological: Negative  Hematological: Negative            Objective:      /76 (BP Location: Right arm, Patient Position: Sitting, Cuff Size: Adult)   Pulse 68   Temp 98 6 °F (37 °C) (Temporal)   Ht 6' (1 829 m)   Wt 74 6 kg (164 lb 6 4 oz)   SpO2 98%   BMI 22 30 kg/m²          Physical Exam

## 2018-07-12 ENCOUNTER — TELEPHONE (OUTPATIENT)
Dept: INTERNAL MEDICINE CLINIC | Facility: CLINIC | Age: 48
End: 2018-07-12

## 2018-07-12 DIAGNOSIS — F41.9 ANXIETY: Primary | ICD-10-CM

## 2018-07-12 RX ORDER — ALPRAZOLAM 0.25 MG/1
0.25 TABLET ORAL 2 TIMES DAILY PRN
Qty: 60 TABLET | Refills: 0 | Status: SHIPPED | OUTPATIENT
Start: 2018-07-12 | End: 2018-12-12 | Stop reason: SDUPTHER

## 2018-07-12 NOTE — TELEPHONE ENCOUNTER
Deepti Dean can you find out who did give him the medication before and what was the dose and frequency of this??

## 2018-07-12 NOTE — TELEPHONE ENCOUNTER
It looks like you gave it to him in May at the last visit  Xanax 0 25 PRN bedtime   It was discontinued at his last visit in June

## 2018-07-12 NOTE — TELEPHONE ENCOUNTER
Patients wife called to see if we can give a refill on [facility]'s Xanax  She stated he was prescribed Xanax in may for a 30 day supply to use PRN as he was trying to quit smoking per doctors orders  He does not want to use any patches/pills for smoking cessation  She said he has only been using the Xanax as needed to take the edge off while he stops smoking and it has really been helping  Is this something we can refill for him again?

## 2018-09-26 ENCOUNTER — OFFICE VISIT (OUTPATIENT)
Dept: CARDIOLOGY CLINIC | Facility: HOSPITAL | Age: 48
End: 2018-09-26
Payer: COMMERCIAL

## 2018-09-26 VITALS
WEIGHT: 175.8 LBS | HEIGHT: 72 IN | SYSTOLIC BLOOD PRESSURE: 124 MMHG | DIASTOLIC BLOOD PRESSURE: 76 MMHG | HEART RATE: 70 BPM | BODY MASS INDEX: 23.81 KG/M2

## 2018-09-26 DIAGNOSIS — R55 SYNCOPE AND COLLAPSE: ICD-10-CM

## 2018-09-26 DIAGNOSIS — Z98.890 HISTORY OF CARDIOVERSION: ICD-10-CM

## 2018-09-26 DIAGNOSIS — I48.91 ATRIAL FIBRILLATION WITH RVR (HCC): Primary | ICD-10-CM

## 2018-09-26 PROCEDURE — 99214 OFFICE O/P EST MOD 30 MIN: CPT | Performed by: INTERNAL MEDICINE

## 2018-09-26 PROCEDURE — 93000 ELECTROCARDIOGRAM COMPLETE: CPT | Performed by: INTERNAL MEDICINE

## 2018-09-26 RX ORDER — ASPIRIN 81 MG/1
81 TABLET ORAL DAILY
COMMUNITY

## 2018-09-26 NOTE — PROGRESS NOTES
Cardiology Follow Up    Christine Nunez  1970  [id number]  [address] [facility] [address] [address]    1  Atrial fibrillation with RVR (HCC)  POCT ECG   2  History of cardioversion     3  Syncope and collapse         Discussion/Summary:  Mr Romeo Wilkinson is a pleasant 41-year-old gentleman who presents to the office today for routine follow-up  He continues to maintain normal sinus rhythm  He will remain on his current AV shea blocking regimen  He completed one month of anticoagulation post cardioversion given his low [id number] score this was discontinued  We did again discuss implantation of a loop recorder given his atrial fibrillation and syncope  At this point he again  declines  He did quit smoking since hospital discharge  He was congratulated and encouraged to remain tobacco free  He is due to have his lipids assessed in the near future be his primary care provider and I will await those results  I will see him back in the office in six months for ongoing evaluation  Interval History:   Mr Romeo Wilkinson is a 41-year-old gentleman who presents to the office today for routine follow-up  Since his last visit he has been feeling well  He is active  With the activity he is able to perform he denies any exertional chest pain or shortness of breath  He denies any signs or symptoms of congestive heart failure including increasing lower extremity edema, paroxysmal nocturnal dyspnea, orthopnea, acute weight gain or increasing abdominal girth  He denies palpitation or any sensation of recurrent atrial fibrillation  He denies lightheadedness, syncope or presyncope  He denies symptoms of claudication  He discontinued Eliquis since his last visit  He remains tobacco free      Problem List     Atrial fibrillation with RVR (HCC)    Syncope and collapse    Smoker    Acute pain of left shoulder    Facial laceration    Hypokalemia    Leukocytosis    Foreign body in pharynx    Fall at home, initial encounter        Past Medical History:   Diagnosis Date    H/O von Willebrand's disease     Migraines     Wrist fracture     left     Social History     Social History    Marital status: /Civil Union     Spouse name: N/A    Number of children: N/A    Years of education: N/A     Occupational History    Not on file  Social History Main Topics    Smoking status: Former Smoker     Packs/day: 1 00     Types: Cigarettes     Quit date: 5/11/2018    Smokeless tobacco: Never Used    Alcohol use Yes      Comment: occasional    Drug use: No    Sexual activity: Not on file     Other Topics Concern    Not on file     Social History Narrative    No narrative on file      Family History   Problem Relation Age of Onset    Breast cancer Mother     Heart attack Father      Past Surgical History:   Procedure Laterality Date    FL CARDIOVERSION ELECTIVE ARRHYTHMIA EXTERNAL N/A 5/15/2018    Procedure: CARDIOVERSION;  Surgeon: Mendez Alvarez DO;  Location: [facility];  Service: Cardiology    FL ECHO TRANSESOPHAG R-T 2D W/PRB IMG ACQUISJ I&R N/A 5/15/2018    Procedure: TRANSESOPHAGEAL ECHOCARDIOGRAM (AGNIESZKA);   Surgeon: Mendez Alvarez DO;  Location: [facility];  Service: Cardiology    WISDOM TOOTH EXTRACTION         Current Outpatient Prescriptions:     ALPRAZolam (XANAX) 0 25 mg tablet, Take 1 tablet (0 25 mg total) by mouth 2 (two) times a day as needed for anxiety, Disp: 60 tablet, Rfl: 0    aspirin (ECOTRIN LOW STRENGTH) 81 mg EC tablet, Take 81 mg by mouth daily, Disp: , Rfl:     metoprolol succinate (TOPROL-XL) 50 mg 24 hr tablet, Take 1 tablet (50 mg total) by mouth daily, Disp: 30 tablet, Rfl: 11  No Known Allergies    Labs:     Chemistry        Component Value Date/Time     05/15/2018 0525    K 4 1 05/15/2018 0525     05/15/2018 0525    CO2 27 05/15/2018 0525    BUN 12 05/15/2018 0525    CREATININE 0 82 05/15/2018 0525        Component Value Date/Time    CALCIUM 8 6 05/15/2018 0525    ALKPHOS 75 05/12/2018 0508    AST 15 05/12/2018 0508    ALT 25 05/12/2018 0508            No results found for: CHOL  Lab Results   Component Value Date    HDL 42 01/15/2016     Lab Results   Component Value Date    LDLCALC 113 (H) 01/15/2016     Lab Results   Component Value Date    TRIG 72 01/15/2016     No components found for: CHOLHDL    Imaging: Cta Head And Neck W Wo Contrast    Result Date: 5/13/2018  Narrative: CTA NECK AND BRAIN WITH AND WITHOUT CONTRAST INDICATION: syncope and collapse COMPARISON:   5/11/2018 head CT TECHNIQUE:  Routine CT imaging of the Brain without contrast   Post contrast imaging was performed after administration of iodinated contrast through the neck and brain  Post contrast axial 0 625 mm images timed to opacify the arterial system  3D rendering was performed on an independent workstation  MIP reconstructions performed  Coronal reconstructions were performed of the noncontrast portion of the brain  Radiation dose length product (DLP) for this visit:  3845 mGy-cm   This examination, like all CT scans performed in the [facility], was performed utilizing techniques to minimize radiation dose exposure, including the use of iterative reconstruction and automated exposure control  IV Contrast:  100 mL of iohexol (OMNIPAQUE)  IMAGE QUALITY:   Diagnostic FINDINGS: NONCONTRAST BRAIN PARENCHYMA:  No intracranial mass, mass effect or midline shift  No acute intracranial hemorrhage  No CT signs of acute infarction  VENTRICLES AND EXTRA-AXIAL SPACES:  Normal for patient's age  VISUALIZED ORBITS AND PARANASAL SINUSES:  Unremarkable  CALVARIUM AND EXTRACRANIAL SOFT TISSUES:   Normal  CERVICAL VASCULATURE AORTIC ARCH AND GREAT VESSELS:  Normal aortic arch and great vessel origins  Normal visualized subclavian vessels   RIGHT VERTEBRAL ARTERY CERVICAL SEGMENT:  Normal origin  The vessel is normal in caliber throughout the neck  LEFT VERTEBRAL ARTERY CERVICAL SEGMENT:  Normal origin  The vessel is normal in caliber throughout the neck  RIGHT EXTRACRANIAL CAROTID SEGMENT:  Normal caliber common carotid artery  Normal bifurcation and cervical internal carotid artery  No stenosis or dissection  LEFT EXTRACRANIAL CAROTID SEGMENT:  Normal caliber common carotid artery  Normal bifurcation and cervical internal carotid artery  No stenosis or dissection  NASCET criteria was used to determine the degree of internal carotid artery diameter stenosis  INTRACRANIAL VASCULATURE INTERNAL CAROTID ARTERIES:  Normal enhancement of the intracranial portions of the internal carotid arteries  Normal ophthalmic artery origins  Normal ICA terminus  ANTERIOR CIRCULATION:  Symmetric A1 segments and anterior cerebral arteries with normal enhancement  Normal anterior communicating artery  MIDDLE CEREBRAL ARTERY CIRCULATION:  M1 segment and middle cerebral artery branches demonstrate normal enhancement bilaterally  DISTAL VERTEBRAL ARTERIES:  Normal distal vertebral arteries  Posterior inferior cerebellar artery origins are normal  Normal vertebral basilar junction  BASILAR ARTERY:  Basilar artery is normal in caliber  Normal superior cerebellar arteries  POSTERIOR CEREBRAL ARTERIES: Both posterior cerebral arteries arises from the basilar tip  Both arteries demonstrate normal enhancement  Normal posterior communicating arteries  DURAL VENOUS SINUSES:  Normal  NON VASCULAR ANATOMY BONY STRUCTURES:  No acute osseous abnormality  SOFT TISSUES OF THE NECK:  Unremarkable  THORACIC INLET:  Unremarkable       Impression: No significant brain parenchymal abnormalities, consistent with prior study No acute intracranial hemorrhage or mass effect No evidence of significant stenosis, dissection or occlusion involving cervical carotid or vertebral segments or visualized cerebral arteries Workstation performed: [id number]     Xr Neck Soft Tissue    Result Date: 5/13/2018  Narrative: NECK  SOFT TISSUE EXAMINATION INDICATION:   Foreign body  COMPARISON:  None VIEWS:  XR NECK SOFT TISSUE FINDINGS: The epiglottis and aryepiglottic folds are unremarkable in appearance  No radiopaque foreign bodies are appreciated  There are no osseous abnormalities  Impression: Unremarkable study  Workstation performed: [id number]     Xr Chest 1 View Portable    Result Date: 5/11/2018  Narrative: CHEST INDICATION:   Trauma  COMPARISON:  None EXAM PERFORMED/VIEWS:  XR CHEST PORTABLE  AP supine portable FINDINGS: Questionable superior mediastinal widening which could be from brachiocephalic vessels/positioning, follow-up with repeat PA and lateral chest x-rays  The lungs are clear  No pleural effusion  Osseous structures appear within normal limits for patient age  Impression: Questionable superior mediastinal widening which could be from brachiocephalic vessels/positioning, follow-up with repeat PA and lateral chest x-rays  Workstation performed: [id number]     Xr Chest 2 Views    Result Date: 5/11/2018  Narrative: CHEST INDICATION: Trauma, follow-up COMPARISON:  Previous exam same date EXAM PERFORMED/VIEWS:  XR CHEST PA & LATERAL FINDINGS: Persistent somewhat oblique right paratracheal density possibly representing brachiocephalic vessels  Adenopathy could appear similarly  Vascular injury considered less likely  Normal cardiac silhouette  The lungs are clear  No pneumothorax or pleural effusion  Osseous structures appear within normal limits for patient age  Impression: Persistent right paratracheal density suspicious for brachiocephalic vessels or adenopathy  Vascular injury considered less likely Workstation performed: [id number]     Xr Shoulder 2+ Views Left    Result Date: 5/11/2018  Narrative: LEFT SHOULDER INDICATION:   trauma   COMPARISON:  None VIEWS:  XR SHOULDER 2+ VW LEFT FINDINGS: There is no acute fracture or dislocation  No significant degenerative changes  No lytic or blastic lesions are seen  Soft tissues are unremarkable  Impression: No acute osseous abnormality  Workstation performed: [id number]     Ct Head Wo Contrast    Result Date: 5/11/2018  Narrative: CT BRAIN - WITHOUT CONTRAST INDICATION:   trauma  COMPARISON:  None  TECHNIQUE:  CT examination of the brain was performed  In addition to axial images, coronal 2D reformatted images were created and submitted for interpretation  Radiation dose length product (DLP) for this visit:  1077 mGy-cm   This examination, like all CT scans performed in the [facility], was performed utilizing techniques to minimize radiation dose exposure, including the use of iterative reconstruction and automated exposure control  IMAGE QUALITY:  Diagnostic  FINDINGS: PARENCHYMA:  No intracranial mass, mass effect or midline shift  No CT signs of acute infarction  No acute intracranial hemorrhage  VENTRICLES AND EXTRA-AXIAL SPACES:  Normal for the patient's age  VISUALIZED ORBITS AND PARANASAL SINUSES:  Unremarkable  CALVARIUM AND EXTRACRANIAL SOFT TISSUES:  Normal      Impression: No acute intracranial abnormality  Workstation performed: [id number]     Ct Facial Bones Wo Contrast    Result Date: 5/11/2018  Narrative: CT FACIAL BONES WITHOUT INTRAVENOUS CONTRAST INDICATION:   facial trauma  COMPARISON: None  TECHNIQUE:  Axial CT images were obtained through the facial bones with additional sagittal and coronal reconstructions  Radiation dose length product (DLP) for this visit:  358 mGy-cm   This examination, like all CT scans performed in the [facility], was performed utilizing techniques to minimize radiation dose exposure, including the use of iterative reconstruction and automated exposure control  IMAGE QUALITY:  Diagnostic  FINDINGS: FACIAL BONES:   No facial bone fracture identified    Normal alignment of the temporomandibular joints  No lytic or blastic lesion  ORBITS:  Orbital globes, optic nerves, and extraocular muscles appear symmetric and normal  There is no evidence of retrobulbar mass, abscess, or hematoma  SINUSES:  Normal  SOFT TISSUES:  There is soft tissue swelling at the anterior right mandibular region  Impression: No facial bone fracture or subluxation  Workstation performed: [id number]     Ct Spine Cervical Wo Contrast    Result Date: 5/11/2018  Narrative: CT CERVICAL SPINE - WITHOUT CONTRAST INDICATION:   trauma  COMPARISON: None  TECHNIQUE:  CT examination of the cervical spine was performed without intravenous contrast   Contiguous axial images were obtained  Sagittal and coronal reconstructions were performed  Radiation dose length product (DLP) for this visit:  529 mGy-cm   This examination, like all CT scans performed in the [facility], was performed utilizing techniques to minimize radiation dose exposure, including the use of iterative reconstruction and automated exposure control  IMAGE QUALITY:  Diagnostic  FINDINGS: ALIGNMENT:  Normal alignment of the cervical spine  No subluxation  VERTEBRAL BODIES:  No fracture  There are subcentimeter sclerotic densities noted at the left aspect of C1 and C4 likely representing bone islands  DEGENERATIVE CHANGES:  No significant cervical degenerative changes are noted  PREVERTEBRAL AND PARASPINAL SOFT TISSUES:  There is inspissated material noted at the dependent portion of the hypopharynx with an approximately 7 x 3 mm metallic density noted at the posterior midline the level of the piriform sinuses  THORACIC INLET:  Normal      Impression: No cervical spine fracture or traumatic malalignment  Subcentimeter metallic density noted within the dependent portion of the hypopharynx at the level of the piriform sinuses  The findings may be related to an aspirated dental amalgam   ENT consultation is suggested    I personally discussed this study with Fatuma Carvalho 5/11/2018 at 7:02 PM   Workstation performed: [id number]     Cta Dissection Protocol Chest And Abdomen    Result Date: 5/11/2018  Narrative: CTA - CHEST AND ABDOMEN  - WITHOUT AND WITH IV CONTRAST INDICATION:   Chest pain  COMPARISON:  None  TECHNIQUE: CT examination of the chest and abdomen was performed both prior to and after the administration of intravenous contrast   Thin section angiographic arterial phase post contrast technique was used in order to evaluate for aortic dissection  3D reformatted images and volume rendering were performed on an independent workstation  Additionally, axial, sagittal, and coronal 2D reformatted images were created from the source data and submitted for interpretation  Radiation dose length product (DLP) for this visit:  1033 mGy-cm   This examination, like all CT scans performed in the [facility], was performed utilizing techniques to minimize radiation dose exposure, including the use of iterative reconstruction and automated exposure control  IV Contrast:  100 mL of iohexol (OMNIPAQUE) Enteric Contrast: Enteric contrast was not administered  FINDINGS: AORTA: There is no aortic dissection or intramural hematoma  There is no aortic aneurysm  There is a prominent ductus bump/outpouching at the ligamentum arteriosum, likely congenital   Superior indentation on the celiac axis best seen on the sagittal view 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 to median arcuate ligament syndrome  CHEST LUNGS:  Bibasilar subsegmental atelectasis  PLEURA:  Unremarkable  HEART/PULMONARY ARTERIAL TREE:  Heart is unremarkable for the patient's age  Within the limitations of this examination there is no evidence of pulmonary embolus  MEDIASTINUM AND HORACIO:  Unremarkable  CHEST WALL AND LOWER NECK:   Unremarkable  ABDOMEN LIVER/BILIARY TREE:  Mild hepatic steatosis and hepatomegaly  GALLBLADDER:  No calcified gallstones   No pericholecystic inflammatory change  SPLEEN:  Unremarkable  PANCREAS:  Unremarkable  ADRENAL GLANDS:  Unremarkable  KIDNEYS/URETERS:  Unremarkable  No hydronephrosis  VISUALIZED STOMACH AND BOWEL:  Mildly thickened proximal transverse colon likely from underdistention rather than colitis  VISUALIZED ABDOMINOPELVIC CAVITY:  No ascites or free intraperitoneal air  No lymphadenopathy  ABDOMINAL WALL:  Unremarkable  OSSEOUS STRUCTURES:  No acute fracture or destructive osseous lesion  Impression: 1  No evidence of aneurysm or dissection  2   Clear lungs  3   Mild hepatic steatosis and hepatomegaly  4   Superior indentation on the celiac axis best seen on the sagittal view 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 to median arcuate ligament syndrome  Workstation performed: [id number]       ECG:  Normal sinus rhythm, bi-atrial enlargement      Review of Systems   Cardiovascular: Negative for chest pain, claudication, cyanosis, dyspnea on exertion, irregular heartbeat, leg swelling, near-syncope, orthopnea, palpitations, paroxysmal nocturnal dyspnea and syncope  All other systems reviewed and are negative  Vitals:    09/26/18 1508   BP: 124/76   Pulse: 70     Vitals:    09/26/18 1508   Weight: 79 7 kg (175 lb 12 8 oz)     Height: 6' (182 9 cm)   Body mass index is 23 84 kg/m²      Physical Exam:   General appearance:  Appears stated age, alert, well appearing and in no distress  HEENT:  PERRLA, EOMI, no scleral icterus, no conjunctival pallor  NECK:  Supple, No elevated JVP, no thyromegaly, no carotid bruits  HEART:  Regular rate and rhythm, normal S1/S2, no S3/S4, no murmur or rub  LUNGS:  Clear to auscultation bilaterally  ABDOMEN:  Soft, non-tender, positive bowel sounds, no rebound or guarding, no organomegaly   EXTREMITIES:  No edema  VASCULAR:  Normal pedal pulses   SKIN: No lesions or rashes on exposed skin  NEURO:  CN II-XII intact, no focal deficits

## 2018-12-06 ENCOUNTER — LAB (OUTPATIENT)
Dept: LAB | Facility: HOSPITAL | Age: 48
End: 2018-12-06
Payer: COMMERCIAL

## 2018-12-06 DIAGNOSIS — I48.91 ATRIAL FIBRILLATION WITH RVR (HCC): ICD-10-CM

## 2018-12-06 DIAGNOSIS — Z13.6 SCREENING FOR CARDIOVASCULAR CONDITION: ICD-10-CM

## 2018-12-06 DIAGNOSIS — R55 SYNCOPE AND COLLAPSE: ICD-10-CM

## 2018-12-06 DIAGNOSIS — Z98.890 HISTORY OF CARDIOVERSION: ICD-10-CM

## 2018-12-06 LAB
ALBUMIN SERPL BCP-MCNC: 3.8 G/DL (ref 3.5–5)
ALP SERPL-CCNC: 95 U/L (ref 46–116)
ALT SERPL W P-5'-P-CCNC: 49 U/L (ref 12–78)
ANION GAP SERPL CALCULATED.3IONS-SCNC: 9 MMOL/L (ref 4–13)
AST SERPL W P-5'-P-CCNC: 18 U/L (ref 5–45)
BASOPHILS # BLD AUTO: 0.03 THOUSANDS/ΜL (ref 0–0.1)
BASOPHILS NFR BLD AUTO: 1 % (ref 0–1)
BILIRUB SERPL-MCNC: 0.4 MG/DL (ref 0.2–1)
BUN SERPL-MCNC: 13 MG/DL (ref 5–25)
CALCIUM SERPL-MCNC: 8.9 MG/DL (ref 8.3–10.1)
CHLORIDE SERPL-SCNC: 102 MMOL/L (ref 100–108)
CHOLEST SERPL-MCNC: 259 MG/DL (ref 50–200)
CO2 SERPL-SCNC: 27 MMOL/L (ref 21–32)
CREAT SERPL-MCNC: 0.86 MG/DL (ref 0.6–1.3)
EOSINOPHIL # BLD AUTO: 0.11 THOUSAND/ΜL (ref 0–0.61)
EOSINOPHIL NFR BLD AUTO: 2 % (ref 0–6)
ERYTHROCYTE [DISTWIDTH] IN BLOOD BY AUTOMATED COUNT: 11.5 % (ref 11.6–15.1)
GFR SERPL CREATININE-BSD FRML MDRD: 103 ML/MIN/1.73SQ M
GLUCOSE P FAST SERPL-MCNC: 86 MG/DL (ref 65–99)
HCT VFR BLD AUTO: 46.1 % (ref 36.5–49.3)
HDLC SERPL-MCNC: 47 MG/DL (ref 40–60)
HGB BLD-MCNC: 16 G/DL (ref 12–17)
IMM GRANULOCYTES # BLD AUTO: 0.02 THOUSAND/UL (ref 0–0.2)
IMM GRANULOCYTES NFR BLD AUTO: 0 % (ref 0–2)
LDLC SERPL CALC-MCNC: 174 MG/DL (ref 0–100)
LYMPHOCYTES # BLD AUTO: 2.43 THOUSANDS/ΜL (ref 0.6–4.47)
LYMPHOCYTES NFR BLD AUTO: 37 % (ref 14–44)
MCH RBC QN AUTO: 29.5 PG (ref 26.8–34.3)
MCHC RBC AUTO-ENTMCNC: 34.7 G/DL (ref 31.4–37.4)
MCV RBC AUTO: 85 FL (ref 82–98)
MONOCYTES # BLD AUTO: 0.73 THOUSAND/ΜL (ref 0.17–1.22)
MONOCYTES NFR BLD AUTO: 11 % (ref 4–12)
NEUTROPHILS # BLD AUTO: 3.34 THOUSANDS/ΜL (ref 1.85–7.62)
NEUTS SEG NFR BLD AUTO: 49 % (ref 43–75)
NONHDLC SERPL-MCNC: 212 MG/DL
NRBC BLD AUTO-RTO: 0 /100 WBCS
PLATELET # BLD AUTO: 253 THOUSANDS/UL (ref 149–390)
PMV BLD AUTO: 9.5 FL (ref 8.9–12.7)
POTASSIUM SERPL-SCNC: 4.2 MMOL/L (ref 3.5–5.3)
PROT SERPL-MCNC: 7.5 G/DL (ref 6.4–8.2)
RBC # BLD AUTO: 5.43 MILLION/UL (ref 3.88–5.62)
SODIUM SERPL-SCNC: 138 MMOL/L (ref 136–145)
TRIGL SERPL-MCNC: 192 MG/DL
TSH SERPL DL<=0.05 MIU/L-ACNC: 3 UIU/ML (ref 0.36–3.74)
WBC # BLD AUTO: 6.66 THOUSAND/UL (ref 4.31–10.16)

## 2018-12-06 PROCEDURE — 84443 ASSAY THYROID STIM HORMONE: CPT

## 2018-12-06 PROCEDURE — 80053 COMPREHEN METABOLIC PANEL: CPT

## 2018-12-06 PROCEDURE — 85025 COMPLETE CBC W/AUTO DIFF WBC: CPT

## 2018-12-06 PROCEDURE — 80061 LIPID PANEL: CPT

## 2018-12-06 PROCEDURE — 36415 COLL VENOUS BLD VENIPUNCTURE: CPT

## 2018-12-12 ENCOUNTER — OFFICE VISIT (OUTPATIENT)
Dept: INTERNAL MEDICINE CLINIC | Facility: CLINIC | Age: 48
End: 2018-12-12
Payer: COMMERCIAL

## 2018-12-12 VITALS
WEIGHT: 183.3 LBS | SYSTOLIC BLOOD PRESSURE: 124 MMHG | HEART RATE: 72 BPM | OXYGEN SATURATION: 98 % | BODY MASS INDEX: 24.83 KG/M2 | HEIGHT: 72 IN | TEMPERATURE: 98.4 F | DIASTOLIC BLOOD PRESSURE: 80 MMHG

## 2018-12-12 DIAGNOSIS — F41.9 ANXIETY: ICD-10-CM

## 2018-12-12 DIAGNOSIS — E78.5 HYPERLIPIDEMIA, UNSPECIFIED HYPERLIPIDEMIA TYPE: ICD-10-CM

## 2018-12-12 DIAGNOSIS — I48.91 ATRIAL FIBRILLATION WITH RVR (HCC): Primary | ICD-10-CM

## 2018-12-12 DIAGNOSIS — Z98.890 HISTORY OF CARDIOVERSION: ICD-10-CM
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
## 2018-12-12 PROBLEM — Z13.6 SCREENING FOR CARDIOVASCULAR CONDITION: Status: RESOLVED | Noted: 2018-06-13 | Resolved: 2018-12-12

## 2018-12-12 PROBLEM — E87.6 HYPOKALEMIA: Status: RESOLVED | Noted: 2018-05-11 | Resolved: 2018-12-12

## 2018-12-12 PROBLEM — M25.512 ACUTE PAIN OF LEFT SHOULDER: Status: RESOLVED | Noted: 2018-05-11 | Resolved: 2018-12-12

## 2018-12-12 PROBLEM — S01.81XA FACIAL LACERATION: Status: RESOLVED | Noted: 2018-05-11 | Resolved: 2018-12-12
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
## 2018-12-12 PROCEDURE — 3008F BODY MASS INDEX DOCD: CPT | Performed by: NURSE PRACTITIONER

## 2018-12-12 PROCEDURE — 99214 OFFICE O/P EST MOD 30 MIN: CPT | Performed by: NURSE PRACTITIONER

## 2018-12-12 PROCEDURE — 1036F TOBACCO NON-USER: CPT | Performed by: NURSE PRACTITIONER

## 2018-12-12 RX ORDER — ATORVASTATIN CALCIUM 20 MG/1
20 TABLET, FILM COATED ORAL DAILY
Qty: 30 TABLET | Refills: 3 | Status: SHIPPED | OUTPATIENT
Start: 2018-12-12 | End: 2019-02-19

## 2018-12-12 RX ORDER — ALPRAZOLAM 0.25 MG/1
0.25 TABLET ORAL 2 TIMES DAILY PRN
Qty: 60 TABLET | Refills: 0 | Status: SHIPPED | OUTPATIENT
Start: 2018-12-12

## 2018-12-12 NOTE — PROGRESS NOTES
Assessment/Plan: Recent labs did show his cholesterol is up at 259, triglycerides 192, and   Will start him on Lipitor 20 mg daily and he was advised to add fiber in his diet  Will recheck CMP in 6 weeks and lipid panel again in 3 months  Will renew Xanax inquiry ran and consistent with prescribing history  He was advised to quit smoking  Other labs are within normal limits  BP stable 124/80  Patient is deferring Flu vaccine  Will bring back in 6 months or sooner if need be  No problem-specific Assessment & Plan notes found for this encounter  Problem List Items Addressed This Visit     Atrial fibrillation with RVR ([address] ) - Primary    History of cardioversion    Anxiety    Relevant Medications    ALPRAZolam (XANAX) 0 25 mg tablet    Hyperlipidemia    Relevant Medications    atorvastatin (LIPITOR) 20 mg tablet    Other Relevant Orders    Lipid panel    Comprehensive metabolic panel            Subjective:      Patient ID: Funmi Ortiz is a 53 y o  male  Kemi Rout is here today for a follow up visit  He is doing well today and did follow up with Cardiology back in September after having an episode in the Summer and syncope and was found to be in AFIB  He has since quit smoking and was declining a loop recorder at this time  He did have his labs done before today's visit  HE states he is still smoking once in a blue moon but only around 6 cigarettes here or there  He states he would like a refill on his Xanax as well  He states this does help his symptoms with smoking  He denies any chest pain, SOB, or palpitations  He states he is feeling well otherwise  He is deferring a Flu vaccine  He offers no other complaints  The following portions of the patient's history were reviewed and updated as appropriate:   He  has a past medical history of H/O von Willebrand's disease; Migraines; and Wrist fracture    He   Patient Active Problem List    Diagnosis Date Noted    Hyperlipidemia 12/12/2018    History of cardioversion 06/04/2018    Atrial fibrillation with RVR ([address] ) 05/11/2018    Syncope and collapse 05/11/2018    Smoker 05/11/2018    Leukocytosis 05/11/2018    Foreign body in pharynx 05/11/2018    Anxiety 01/06/2016     He  has a past surgical history that includes [place] tooth extraction; pr cardioversion elective arrhythmia external (N/A, 5/15/2018); and pr echo transesophag r-t 2d w/prb img acquisj i&r (N/A, 5/15/2018)  His family history includes Breast cancer in his mother; Heart attack in his father  He  reports that he quit smoking about 7 months ago  His smoking use included Cigarettes  He smoked 1 00 pack per day  He has never used smokeless tobacco  He reports that he drinks alcohol  He reports that he does not use drugs  Current Outpatient Prescriptions   Medication Sig Dispense Refill    ALPRAZolam (XANAX) 0 25 mg tablet Take 1 tablet (0 25 mg total) by mouth 2 (two) times a day as needed for anxiety 60 tablet 0    aspirin (ECOTRIN LOW STRENGTH) 81 mg EC tablet Take 81 mg by mouth daily      metoprolol succinate (TOPROL-XL) 50 mg 24 hr tablet Take 1 tablet (50 mg total) by mouth daily 30 tablet 11    atorvastatin (LIPITOR) 20 mg tablet Take 1 tablet (20 mg total) by mouth daily 30 tablet 3     No current facility-administered medications for this visit  Current Outpatient Prescriptions on File Prior to Visit   Medication Sig    aspirin (ECOTRIN LOW STRENGTH) 81 mg EC tablet Take 81 mg by mouth daily    metoprolol succinate (TOPROL-XL) 50 mg 24 hr tablet Take 1 tablet (50 mg total) by mouth daily    [DISCONTINUED] ALPRAZolam (XANAX) 0 25 mg tablet Take 1 tablet (0 25 mg total) by mouth 2 (two) times a day as needed for anxiety     No current facility-administered medications on file prior to visit  He has No Known Allergies       Review of Systems   All other systems reviewed and are negative        Below is the patient's most recent value for Albumin, ALT, AST, BUN, Calcium, Chloride, Cholesterol, CO2, Creatinine, GFR, Glucose, HDL, Hematocrit, Hemoglobin, Hemoglobin A1C, LDL, Magnesium, Phosphorus, Platelets, Potassium, PSA, Sodium, Triglycerides, and WBC  Lab Results   Component Value Date    ALT 49 12/06/2018    AST 18 12/06/2018    BUN 13 12/06/2018    CALCIUM 8 9 12/06/2018     12/06/2018    CO2 27 12/06/2018    CREATININE 0 86 12/06/2018    HDL 47 12/06/2018    HCT 46 1 12/06/2018    HGB 16 0 12/06/2018    HGBA1C 5 5 05/12/2018    MG 1 9 05/12/2018    PHOS 3 6 05/12/2018     12/06/2018    K 4 2 12/06/2018    TRIG 192 (H) 12/06/2018    WBC 6 66 12/06/2018     Note: for a comprehensive list of the patient's lab results, access the Results Review activity  Objective:      /80 (BP Location: Right arm, Patient Position: Sitting, Cuff Size: Adult)   Pulse 72   Temp 98 4 °F (36 9 °C)   Ht 6' (1 829 m)   Wt 83 1 kg (183 lb 4 8 oz)   SpO2 98%   BMI 24 86 kg/m²          Physical Exam   Constitutional: He is oriented to person, place, and time  He appears well-developed and well-nourished  HENT:   Head: Normocephalic and atraumatic  Right Ear: External ear normal    Left Ear: External ear normal    Nose: Nose normal    Mouth/Throat: Oropharynx is clear and moist    Eyes: Pupils are equal, round, and reactive to light  Conjunctivae and EOM are normal    Neck: Normal range of motion  Neck supple  Cardiovascular: Normal rate, regular rhythm, normal heart sounds and intact distal pulses  Pulmonary/Chest: Effort normal and breath sounds normal    Abdominal: Soft  Bowel sounds are normal    Musculoskeletal: Normal range of motion  Neurological: He is alert and oriented to person, place, and time  He has normal reflexes  Skin: Skin is warm and dry  Psychiatric: He has a normal mood and affect  His behavior is normal  Judgment and thought content normal    Vitals reviewed

## 2019-01-26 ENCOUNTER — LAB (OUTPATIENT)
Dept: LAB | Facility: HOSPITAL | Age: 49
End: 2019-01-26
Payer: COMMERCIAL

## 2019-01-26 DIAGNOSIS — E78.5 HYPERLIPIDEMIA, UNSPECIFIED HYPERLIPIDEMIA TYPE: ICD-10-CM

## 2019-01-26 LAB
ALBUMIN SERPL BCP-MCNC: 3.7 G/DL (ref 3.5–5)
ALP SERPL-CCNC: 103 U/L (ref 46–116)
ALT SERPL W P-5'-P-CCNC: 41 U/L (ref 12–78)
ANION GAP SERPL CALCULATED.3IONS-SCNC: 11 MMOL/L (ref 4–13)
AST SERPL W P-5'-P-CCNC: 19 U/L (ref 5–45)
BILIRUB SERPL-MCNC: 0.5 MG/DL (ref 0.2–1)
BUN SERPL-MCNC: 11 MG/DL (ref 5–25)
CALCIUM SERPL-MCNC: 9 MG/DL (ref 8.3–10.1)
CHLORIDE SERPL-SCNC: 103 MMOL/L (ref 100–108)
CO2 SERPL-SCNC: 28 MMOL/L (ref 21–32)
CREAT SERPL-MCNC: 0.96 MG/DL (ref 0.6–1.3)
GFR SERPL CREATININE-BSD FRML MDRD: 94 ML/MIN/1.73SQ M
GLUCOSE P FAST SERPL-MCNC: 89 MG/DL (ref 65–99)
POTASSIUM SERPL-SCNC: 4.3 MMOL/L (ref 3.5–5.3)
PROT SERPL-MCNC: 6.8 G/DL (ref 6.4–8.2)
SODIUM SERPL-SCNC: 142 MMOL/L (ref 136–145)

## 2019-01-26 PROCEDURE — 36415 COLL VENOUS BLD VENIPUNCTURE: CPT

## 2019-01-26 PROCEDURE — 80053 COMPREHEN METABOLIC PANEL: CPT

## 2019-02-18 ENCOUNTER — TELEPHONE (OUTPATIENT)
Dept: INTERNAL MEDICINE CLINIC | Facility: CLINIC | Age: 49
End: 2019-02-18

## 2019-02-18 NOTE — TELEPHONE ENCOUNTER
Received a call from Franklin's wife  She states that Franklin's been taking lipitor since December 2018, stating that for the past 2 months he's been having pain in the Left arm, and a stiffness in both legs  He is having a hard time getting out of bed due to the pain and stiffness  She states that his L arm is in so much pain that he can't really move it properly and can't lift anything with it  She states that they looked up the side effects of the medication, and as of last night he stopped taking the Lipitor  They wanted to make [facility] aware at this point  Please advise on next steps  They are aware that [facility] is not in the office today

## 2019-02-19 DIAGNOSIS — E78.2 MIXED HYPERLIPIDEMIA: Primary | ICD-10-CM

## 2019-02-19 RX ORDER — EZETIMIBE 10 MG/1
10 TABLET ORAL DAILY
Qty: 30 TABLET | Refills: 3 | Status: SHIPPED | OUTPATIENT
Start: 2019-02-19 | End: 2019-07-24 | Stop reason: SDUPTHER

## 2019-02-19 NOTE — TELEPHONE ENCOUNTER
Patient wife called back and was wondering what the status was from talking with Nitza Marie yesterday  Per your conversation that he should take something different  They would like a non statin medication called in to [facility] in [place]

## 2019-02-19 NOTE — TELEPHONE ENCOUNTER
He should really take the Lipitor I am a little concerned why he is having left arm pain and stiffness he can try taking the medication three days a week or I can try to switch him to something else    Due to his cholesterol levels I think he should continue something

## 2019-02-28 NOTE — PLAN OF CARE
Problem: PAIN - ADULT  Goal: Verbalizes/displays adequate comfort level or baseline comfort level  Interventions:  - Encourage patient to monitor pain and request assistance  - Assess pain using appropriate pain scale  - Administer analgesics based on type and severity of pain and evaluate response  - Implement non-pharmacological measures as appropriate and evaluate response  - Consider cultural and social influences on pain and pain management  - Notify physician/advanced practitioner if interventions unsuccessful or patient reports new pain   Outcome: Completed Date Met: 05/15/18      Problem: SAFETY ADULT  Goal: Patient will remain free of falls  INTERVENTIONS:  - Assess patient frequently for physical needs  -  Identify cognitive and physical deficits and behaviors that affect risk of falls    -  [place] fall precautions as indicated by assessment   - Educate patient/family on patient safety including physical limitations  - Instruct patient to call for assistance with activity based on assessment  - Modify environment to reduce risk of injury  - Consider OT/PT consult to assist with strengthening/mobility    Outcome: Completed Date Met: 05/15/18    Goal: Maintain or return to baseline ADL function  INTERVENTIONS:  -  Assess patient's ability to carry out ADLs; assess patient's baseline for ADL function and identify physical deficits which impact ability to perform ADLs (bathing, care of mouth/teeth, toileting, grooming, dressing, etc )  - Assess/evaluate cause of self-care deficits   - Assess range of motion  - Assess patient's mobility; develop plan if impaired  - Assess patient's need for assistive devices and provide as appropriate  - Encourage maximum independence but intervene and supervise when necessary  ¯ Involve family in performance of ADLs  ¯ Assess for home care needs following discharge   ¯ Request OT consult to assist with ADL evaluation and planning for discharge  ¯ Provide patient education as appropriate   Outcome: Completed Date Met: 05/15/18    Goal: Maintain or return mobility status to optimal level  INTERVENTIONS:  - Assess patient's baseline mobility status (ambulation, transfers, stairs, etc )    - Identify cognitive and physical deficits and behaviors that affect mobility  - Identify mobility aids required to assist with transfers and/or ambulation (gait belt, sit-to-stand, lift, walker, cane, etc )  - [place] fall precautions as indicated by assessment  - Record patient progress and toleration of activity level on Mobility SBAR; progress patient to next Phase/Stage  - Instruct patient to call for assistance with activity based on assessment  - Request Rehabilitation consult to assist with strengthening/weightbearing, etc    Outcome: Completed Date Met: 05/15/18      Problem: DISCHARGE PLANNING  Goal: Discharge to home or other facility with appropriate resources  INTERVENTIONS:  - Identify barriers to discharge w/patient and caregiver  - Arrange for needed discharge resources and transportation as appropriate  - Identify discharge learning needs (meds, wound care, etc )  - Arrange for interpretive services to assist at discharge as needed  - Refer to Case Management Department for coordinating discharge planning if the patient needs post-hospital services based on physician/advanced practitioner order or complex needs related to functional status, cognitive ability, or social support system   Outcome: Completed Date Met: 05/15/18      Problem: Knowledge Deficit  Goal: Patient/family/caregiver demonstrates understanding of disease process, treatment plan, medications, and discharge instructions  Complete learning assessment and assess knowledge base    Interventions:  - Provide teaching at level of understanding  - Provide teaching via preferred learning methods   Outcome: Completed Date Met: 05/15/18      Problem: NEUROSENSORY - ADULT  Goal: Achieves stable or improved neurological Gastritis    Pneumonia    Vertigo status  INTERVENTIONS  - Monitor and report changes in neurological status  - Initiate measures to prevent increased intracranial pressure  - Maintain blood pressure and fluid volume within ordered parameters to optimize cerebral perfusion  - Monitor temperature, glucose, and sodium or any other associated labs  Initiate appropriate interventions as ordered  - Monitor for seizure activity   - Administer anti-seizure medications as ordered   Outcome: Completed Date Met: 05/15/18      Problem: CARDIOVASCULAR - ADULT  Goal: Maintains optimal cardiac output and hemodynamic stability  INTERVENTIONS:  - Monitor I/O, vital signs and rhythm  - Monitor for S/S and trends of decreased cardiac output i e  bleeding, hypotension  - Administer and titrate ordered vasoactive medications to optimize hemodynamic stability  - Assess quality of pulses, skin color and temperature  - Assess for signs of decreased coronary artery perfusion - ex  Angina  - Instruct patient to report change in severity of symptoms   Outcome: Completed Date Met: 05/15/18    Goal: Absence of cardiac dysrhythmias or at baseline rhythm  INTERVENTIONS:  - Continuous cardiac monitoring, monitor vital signs, obtain 12 lead EKG if indicated  - Administer antiarrhythmic and heart rate control medications as ordered  - Monitor electrolytes and administer replacement therapy as ordered   Outcome: Completed Date Met: 05/15/18      Problem: Potential for Falls  Goal: Patient will remain free of falls  INTERVENTIONS:  - Assess patient frequently for physical needs  -  Identify cognitive and physical deficits and behaviors that affect risk of falls    -  [place] fall precautions as indicated by assessment   - Educate patient/family on patient safety including physical limitations  - Instruct patient to call for assistance with activity based on assessment  - Modify environment to reduce risk of injury  - Consider OT/PT consult to assist with strengthening/mobility Outcome: Completed Date Met: 05/15/18

## 2019-03-19 ENCOUNTER — OFFICE VISIT (OUTPATIENT)
Dept: CARDIOLOGY CLINIC | Facility: HOSPITAL | Age: 49
End: 2019-03-19
Payer: COMMERCIAL

## 2019-03-19 VITALS
DIASTOLIC BLOOD PRESSURE: 84 MMHG | HEIGHT: 72 IN | HEART RATE: 65 BPM | SYSTOLIC BLOOD PRESSURE: 122 MMHG | WEIGHT: 181 LBS | BODY MASS INDEX: 24.52 KG/M2

## 2019-03-19 DIAGNOSIS — R55 SYNCOPE AND COLLAPSE: ICD-10-CM

## 2019-03-19 DIAGNOSIS — I48.91 ATRIAL FIBRILLATION WITH RVR (HCC): Primary | ICD-10-CM

## 2019-03-19 DIAGNOSIS — E78.5 DYSLIPIDEMIA: ICD-10-CM

## 2019-03-19 PROCEDURE — 93000 ELECTROCARDIOGRAM COMPLETE: CPT | Performed by: INTERNAL MEDICINE

## 2019-03-19 PROCEDURE — 99214 OFFICE O/P EST MOD 30 MIN: CPT | Performed by: INTERNAL MEDICINE

## 2019-03-19 NOTE — PROGRESS NOTES
Cardiology Follow Up    Marisabel Jones  1970  [id number]  [address] [place] [address] [facility] [address] [address]    1  History of atrial fibrillation with RVR ([facility] [address] )     2  Syncope and collapse     3  Dyslipidemia         Discussion/Summary:  Mr Joe Merino is a pleasant 64-year-old gentleman who presents to the office today for routine follow-up  Since his last visit cardiac wise he has been feeling well  He offers no cardiopulmonary complaints  He continues to maintain normal sinus rhythm with no signs or symptoms of recurrent PAF  He will remain on his current AV shea blocking regimen  He is not on systemic anticoagulation given his low [id number] score  His most recent lipids were reviewed  He was started on atorvastatin by his primary care provider although this caused myalgias particularly in his left shoulder which improved after discontinuation  He was recently started on Zetia and is due to have his lipids reassessed  I will await those results  No other testing is indicated  I will see him back in the office in six months for ongoing evaluation  Interval History:   Mr Joe Merino is a 64-year-old gentleman who presents to the office today for routine follow-up  Since his last visit he has been feeling well  He is active  With the activity he is able to perform he denies any exertional chest pain or shortness of breath  He denies any signs or symptoms of congestive heart failure including increasing lower extremity edema, paroxysmal nocturnal dyspnea, orthopnea, acute weight gain or increasing abdominal girth  He denies palpitations or any sensation of recurrent atrial fibrillation  He denies lightheadedness, syncope or presyncope  He denies symptoms of claudication  He remains tobacco free      Problem List     Atrial fibrillation with RVR ([facility] [address] ) Syncope and collapse    Smoker    Acute pain of left shoulder    Facial laceration    Hypokalemia    Leukocytosis    Foreign body in pharynx    Fall at home, initial encounter        Past Medical History:   Diagnosis Date    H/O von Willebrand's disease     Migraines     Wrist fracture     left     Social History     Socioeconomic History    Marital status: /Civil Union     Spouse name: Not on file    Number of children: Not on file    Years of education: Not on file    Highest education level: Not on file   Occupational History    Not on file   Social Needs    Financial resource strain: Not on file    Food insecurity:     Worry: Not on file     Inability: Not on file    Transportation needs:     Medical: Not on file     Non-medical: Not on file   Tobacco Use    Smoking status: Former Smoker     Packs/day: 1 00     Types: Cigarettes     Last attempt to quit: 2018     Years since quittin 8    Smokeless tobacco: Never Used   Substance and Sexual Activity    Alcohol use: Yes     Comment: occasional    Drug use: No    Sexual activity: Not on file   Lifestyle    Physical activity:     Days per week: Not on file     Minutes per session: Not on file    Stress: Not on file   Relationships    Social connections:     Talks on phone: Not on file     Gets together: Not on file     Attends [employer] service: Not on file     Active member of club or organization: Not on file     Attends meetings of clubs or organizations: Not on file     Relationship status: Not on file    Intimate partner violence:     Fear of current or ex partner: Not on file     Emotionally abused: Not on file     Physically abused: Not on file     Forced sexual activity: Not on file   Other Topics Concern    Not on file   Social History Narrative    Not on file      Family History   Problem Relation Age of Onset    Breast cancer Mother     Heart attack Father      Past Surgical History:   Procedure Laterality Date  MO CARDIOVERSION ELECTIVE ARRHYTHMIA EXTERNAL N/A 5/15/2018    Procedure: CARDIOVERSION;  Surgeon: Lindsey Londono DO;  Location: [facility];  Service: Cardiology    MO ECHO TRANSESOPHAG R-T 2D W/PRB IMG ALICIA I&R N/A 5/15/2018    Procedure: TRANSESOPHAGEAL ECHOCARDIOGRAM (AGNIESZKA); Surgeon: Lindsey Londono DO;  Location: [facility];  Service: Cardiology    WISDOM TOOTH EXTRACTION         Current Outpatient Medications:     ALPRAZolam (XANAX) 0 25 mg tablet, Take 1 tablet (0 25 mg total) by mouth 2 (two) times a day as needed for anxiety, Disp: 60 tablet, Rfl: 0    aspirin (ECOTRIN LOW STRENGTH) 81 mg EC tablet, Take 81 mg by mouth daily, Disp: , Rfl:     ezetimibe (ZETIA) 10 mg tablet, Take 1 tablet (10 mg total) by mouth daily, Disp: 30 tablet, Rfl: 3    metoprolol succinate (TOPROL-XL) 50 mg 24 hr tablet, Take 1 tablet (50 mg total) by mouth daily, Disp: 30 tablet, Rfl: 11  No Known Allergies    Labs:     Chemistry        Component Value Date/Time    K 4 3 01/26/2019 0826     01/26/2019 0826    CO2 28 01/26/2019 0826    BUN 11 01/26/2019 0826    CREATININE 0 96 01/26/2019 0826        Component Value Date/Time    CALCIUM 9 0 01/26/2019 0826    ALKPHOS 103 01/26/2019 0826    AST 19 01/26/2019 0826    ALT 41 01/26/2019 0826            No results found for: CHOL  Lab Results   Component Value Date    HDL 47 12/06/2018    HDL 42 01/15/2016     Lab Results   Component Value Date    LDLCALC 174 (H) 12/06/2018    LDLCALC 113 (H) 01/15/2016     Lab Results   Component Value Date    TRIG 192 (H) 12/06/2018    TRIG 72 01/15/2016     No results found for: CHOLHDL    Imaging: Cta Head And Neck W Wo Contrast    Result Date: 5/13/2018  Narrative: CTA NECK AND BRAIN WITH AND WITHOUT CONTRAST INDICATION: syncope and collapse COMPARISON:   5/11/2018 head CT TECHNIQUE:  Routine CT imaging of the Brain without contrast   Post contrast imaging was performed after administration of iodinated contrast through the neck and brain  Post contrast axial 0 625 mm images timed to opacify the arterial system  3D rendering was performed on an independent workstation  MIP reconstructions performed  Coronal reconstructions were performed of the noncontrast portion of the brain  Radiation dose length product (DLP) for this visit:  4902 mGy-cm   This examination, like all CT scans performed in the [facility], was performed utilizing techniques to minimize radiation dose exposure, including the use of iterative reconstruction and automated exposure control  IV Contrast:  100 mL of iohexol (OMNIPAQUE)  IMAGE QUALITY:   Diagnostic FINDINGS: NONCONTRAST BRAIN PARENCHYMA:  No intracranial mass, mass effect or midline shift  No acute intracranial hemorrhage  No CT signs of acute infarction  VENTRICLES AND EXTRA-AXIAL SPACES:  Normal for patient's age  VISUALIZED ORBITS AND PARANASAL SINUSES:  Unremarkable  CALVARIUM AND EXTRACRANIAL SOFT TISSUES:   Normal  CERVICAL VASCULATURE AORTIC ARCH AND GREAT VESSELS:  Normal aortic arch and great vessel origins  Normal visualized subclavian vessels  RIGHT VERTEBRAL ARTERY CERVICAL SEGMENT:  Normal origin  The vessel is normal in caliber throughout the neck  LEFT VERTEBRAL ARTERY CERVICAL SEGMENT:  Normal origin  The vessel is normal in caliber throughout the neck  RIGHT EXTRACRANIAL CAROTID SEGMENT:  Normal caliber common carotid artery  Normal bifurcation and cervical internal carotid artery  No stenosis or dissection  LEFT EXTRACRANIAL CAROTID SEGMENT:  Normal caliber common carotid artery  Normal bifurcation and cervical internal carotid artery  No stenosis or dissection  NASCET criteria was used to determine the degree of internal carotid artery diameter stenosis  INTRACRANIAL VASCULATURE INTERNAL CAROTID ARTERIES:  Normal enhancement of the intracranial portions of the internal carotid arteries  Normal ophthalmic artery origins  Normal ICA terminus   ANTERIOR CIRCULATION:  Symmetric A1 segments and anterior cerebral arteries with normal enhancement  Normal anterior communicating artery  MIDDLE CEREBRAL ARTERY CIRCULATION:  M1 segment and middle cerebral artery branches demonstrate normal enhancement bilaterally  DISTAL VERTEBRAL ARTERIES:  Normal distal vertebral arteries  Posterior inferior cerebellar artery origins are normal  Normal vertebral basilar junction  BASILAR ARTERY:  Basilar artery is normal in caliber  Normal superior cerebellar arteries  POSTERIOR CEREBRAL ARTERIES: Both posterior cerebral arteries arises from the basilar tip  Both arteries demonstrate normal enhancement  Normal posterior communicating arteries  DURAL VENOUS SINUSES:  Normal  NON VASCULAR ANATOMY BONY STRUCTURES:  No acute osseous abnormality  SOFT TISSUES OF THE NECK:  Unremarkable  THORACIC INLET:  Unremarkable  Impression: No significant brain parenchymal abnormalities, consistent with prior study No acute intracranial hemorrhage or mass effect No evidence of significant stenosis, dissection or occlusion involving cervical carotid or vertebral segments or visualized cerebral arteries Workstation performed: [id number]     Xr Neck Soft Tissue    Result Date: 5/13/2018  Narrative: NECK  SOFT TISSUE EXAMINATION INDICATION:   Foreign body  COMPARISON:  None VIEWS:  XR NECK SOFT TISSUE FINDINGS: The epiglottis and aryepiglottic folds are unremarkable in appearance  No radiopaque foreign bodies are appreciated  There are no osseous abnormalities  Impression: Unremarkable study  Workstation performed: [id number]     Xr Chest 1 View Portable    Result Date: 5/11/2018  Narrative: CHEST INDICATION:   Trauma  COMPARISON:  None EXAM PERFORMED/VIEWS:  XR CHEST PORTABLE  AP supine portable FINDINGS: Questionable superior mediastinal widening which could be from brachiocephalic vessels/positioning, follow-up with repeat PA and lateral chest x-rays  The lungs are clear  No pleural effusion   Osseous structures appear within normal limits for patient age  Impression: Questionable superior mediastinal widening which could be from brachiocephalic vessels/positioning, follow-up with repeat PA and lateral chest x-rays  Workstation performed: [id number]     Xr Chest 2 Views    Result Date: 5/11/2018  Narrative: CHEST INDICATION: Trauma, follow-up COMPARISON:  Previous exam same date EXAM PERFORMED/VIEWS:  XR CHEST PA & LATERAL FINDINGS: Persistent somewhat oblique right paratracheal density possibly representing brachiocephalic vessels  Adenopathy could appear similarly  Vascular injury considered less likely  Normal cardiac silhouette  The lungs are clear  No pneumothorax or pleural effusion  Osseous structures appear within normal limits for patient age  Impression: Persistent right paratracheal density suspicious for brachiocephalic vessels or adenopathy  Vascular injury considered less likely Workstation performed: [id number]     Xr Shoulder 2+ Views Left    Result Date: 5/11/2018  Narrative: LEFT SHOULDER INDICATION:   trauma  COMPARISON:  None VIEWS:  XR SHOULDER 2+ VW LEFT FINDINGS: There is no acute fracture or dislocation  No significant degenerative changes  No lytic or blastic lesions are seen  Soft tissues are unremarkable  Impression: No acute osseous abnormality  Workstation performed: [id number]     Ct Head Wo Contrast    Result Date: 5/11/2018  Narrative: CT BRAIN - WITHOUT CONTRAST INDICATION:   trauma  COMPARISON:  None  TECHNIQUE:  CT examination of the brain was performed  In addition to axial images, coronal 2D reformatted images were created and submitted for interpretation  Radiation dose length product (DLP) for this visit:  1077 mGy-cm   This examination, like all CT scans performed in the [facility], was performed utilizing techniques to minimize radiation dose exposure, including the use of iterative reconstruction and automated exposure control    IMAGE QUALITY: Diagnostic  FINDINGS: PARENCHYMA:  No intracranial mass, mass effect or midline shift  No CT signs of acute infarction  No acute intracranial hemorrhage  VENTRICLES AND EXTRA-AXIAL SPACES:  Normal for the patient's age  VISUALIZED ORBITS AND PARANASAL SINUSES:  Unremarkable  CALVARIUM AND EXTRACRANIAL SOFT TISSUES:  Normal      Impression: No acute intracranial abnormality  Workstation performed: [id number]     Ct Facial Bones Wo Contrast    Result Date: 5/11/2018  Narrative: CT FACIAL BONES WITHOUT INTRAVENOUS CONTRAST INDICATION:   facial trauma  COMPARISON: None  TECHNIQUE:  Axial CT images were obtained through the facial bones with additional sagittal and coronal reconstructions  Radiation dose length product (DLP) for this visit:  358 mGy-cm   This examination, like all CT scans performed in the [facility], was performed utilizing techniques to minimize radiation dose exposure, including the use of iterative reconstruction and automated exposure control  IMAGE QUALITY:  Diagnostic  FINDINGS: FACIAL BONES:   No facial bone fracture identified  Normal alignment of the temporomandibular joints  No lytic or blastic lesion  ORBITS:  Orbital globes, optic nerves, and extraocular muscles appear symmetric and normal  There is no evidence of retrobulbar mass, abscess, or hematoma  SINUSES:  Normal  SOFT TISSUES:  There is soft tissue swelling at the anterior right mandibular region  Impression: No facial bone fracture or subluxation  Workstation performed: [id number]     Ct Spine Cervical Wo Contrast    Result Date: 5/11/2018  Narrative: CT CERVICAL SPINE - WITHOUT CONTRAST INDICATION:   trauma  COMPARISON: None  TECHNIQUE:  CT examination of the cervical spine was performed without intravenous contrast   Contiguous axial images were obtained  Sagittal and coronal reconstructions were performed  Radiation dose length product (DLP) for this visit:  529 mGy-cm     This examination, like all CT scans performed in the [facility], was performed utilizing techniques to minimize radiation dose exposure, including the use of iterative reconstruction and automated exposure control  IMAGE QUALITY:  Diagnostic  FINDINGS: ALIGNMENT:  Normal alignment of the cervical spine  No subluxation  VERTEBRAL BODIES:  No fracture  There are subcentimeter sclerotic densities noted at the left aspect of C1 and C4 likely representing bone islands  DEGENERATIVE CHANGES:  No significant cervical degenerative changes are noted  PREVERTEBRAL AND PARASPINAL SOFT TISSUES:  There is inspissated material noted at the dependent portion of the hypopharynx with an approximately 7 x 3 mm metallic density noted at the posterior midline the level of the piriform sinuses  THORACIC INLET:  Normal      Impression: No cervical spine fracture or traumatic malalignment  Subcentimeter metallic density noted within the dependent portion of the hypopharynx at the level of the piriform sinuses  The findings may be related to an aspirated dental amalgam   ENT consultation is suggested  I personally discussed this study with Nicky Neal 5/11/2018 at 7:02 PM   Workstation performed: [id number]     Cta Dissection Protocol Chest And Abdomen    Result Date: 5/11/2018  Narrative: CTA - CHEST AND ABDOMEN  - WITHOUT AND WITH IV CONTRAST INDICATION:   Chest pain  COMPARISON:  None  TECHNIQUE: CT examination of the chest and abdomen was performed both prior to and after the administration of intravenous contrast   Thin section angiographic arterial phase post contrast technique was used in order to evaluate for aortic dissection  3D reformatted images and volume rendering were performed on an independent workstation  Additionally, axial, sagittal, and coronal 2D reformatted images were created from the source data and submitted for interpretation  Radiation dose length product (DLP) for this visit:  1033 mGy-cm     This examination, like all CT scans performed in the [facility], was performed utilizing techniques to minimize radiation dose exposure, including the use of iterative reconstruction and automated exposure control  IV Contrast:  100 mL of iohexol (OMNIPAQUE) Enteric Contrast: Enteric contrast was not administered  FINDINGS: AORTA: There is no aortic dissection or intramural hematoma  There is no aortic aneurysm  There is a prominent ductus bump/outpouching at the ligamentum arteriosum, likely congenital   Superior indentation on the celiac axis best seen on the sagittal view 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 to median arcuate ligament syndrome  CHEST LUNGS:  Bibasilar subsegmental atelectasis  PLEURA:  Unremarkable  HEART/PULMONARY ARTERIAL TREE:  Heart is unremarkable for the patient's age  Within the limitations of this examination there is no evidence of pulmonary embolus  MEDIASTINUM AND HORACIO:  Unremarkable  CHEST WALL AND LOWER NECK:   Unremarkable  ABDOMEN LIVER/BILIARY TREE:  Mild hepatic steatosis and hepatomegaly  GALLBLADDER:  No calcified gallstones  No pericholecystic inflammatory change  SPLEEN:  Unremarkable  PANCREAS:  Unremarkable  ADRENAL GLANDS:  Unremarkable  KIDNEYS/URETERS:  Unremarkable  No hydronephrosis  VISUALIZED STOMACH AND BOWEL:  Mildly thickened proximal transverse colon likely from underdistention rather than colitis  VISUALIZED ABDOMINOPELVIC CAVITY:  No ascites or free intraperitoneal air  No lymphadenopathy  ABDOMINAL WALL:  Unremarkable  OSSEOUS STRUCTURES:  No acute fracture or destructive osseous lesion  Impression: 1  No evidence of aneurysm or dissection  2   Clear lungs  3   Mild hepatic steatosis and hepatomegaly  4   Superior indentation on the celiac axis best seen on the sagittal view 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 to median arcuate ligament syndrome   Workstation performed: [id number]       ECG:  Normal sinus rhythm, bi-atrial enlargement      Review of Systems   Cardiovascular: Negative for chest pain, claudication, cyanosis, dyspnea on exertion, irregular heartbeat, leg swelling, near-syncope, orthopnea, palpitations, paroxysmal nocturnal dyspnea and syncope  All other systems reviewed and are negative  There were no vitals filed for this visit  There were no vitals filed for this visit  There is no height or weight on file to calculate BMI      Physical Exam:  General:  Alert and cooperative, appears stated age  HEENT:  PERRLA, EOMI, no scleral icterus, no conjunctival pallor  Neck:  No lymphadenopathy, no thyromegaly, no carotid bruits, no elevated JVP  Heart[de-identified]  Regular rate and rhythm, normal S1/S2, no S3/S4, no murmur  Lungs:  Clear to auscultation bilaterally   Abdomen:  Soft, non-tender, positive bowel sounds, no rebound or guarding,   no organomegaly   Extremities:  No clubbing, cyanosis or edema   Vascular:  2+ pedal pulses  Skin:  No rashes or lesions on exposed skin  Neurologic:  Cranial nerves II-XII grossly intact without focal deficits

## 2019-03-23 ENCOUNTER — APPOINTMENT (OUTPATIENT)
Dept: LAB | Facility: HOSPITAL | Age: 49
End: 2019-03-23
Payer: COMMERCIAL

## 2019-03-23 DIAGNOSIS — E78.5 HYPERLIPIDEMIA, UNSPECIFIED HYPERLIPIDEMIA TYPE: ICD-10-CM

## 2019-03-23 LAB
CHOLEST SERPL-MCNC: 170 MG/DL (ref 50–200)
HDLC SERPL-MCNC: 37 MG/DL (ref 40–60)
LDLC SERPL CALC-MCNC: 111 MG/DL (ref 0–100)
NONHDLC SERPL-MCNC: 133 MG/DL
TRIGL SERPL-MCNC: 111 MG/DL

## 2019-03-23 PROCEDURE — 36415 COLL VENOUS BLD VENIPUNCTURE: CPT

## 2019-03-23 PROCEDURE — 80061 LIPID PANEL: CPT

## 2019-03-26 ENCOUNTER — TELEPHONE (OUTPATIENT)
Dept: INTERNAL MEDICINE CLINIC | Facility: CLINIC | Age: 49
End: 2019-03-26

## 2019-04-05 ENCOUNTER — TELEPHONE (OUTPATIENT)
Dept: INTERNAL MEDICINE CLINIC | Facility: CLINIC | Age: 49
End: 2019-04-05

## 2019-06-19 ENCOUNTER — OFFICE VISIT (OUTPATIENT)
Dept: INTERNAL MEDICINE CLINIC | Facility: CLINIC | Age: 49
End: 2019-06-19
Payer: COMMERCIAL

## 2019-06-19 VITALS
WEIGHT: 177.8 LBS | OXYGEN SATURATION: 98 % | HEART RATE: 57 BPM | HEIGHT: 72 IN | DIASTOLIC BLOOD PRESSURE: 80 MMHG | SYSTOLIC BLOOD PRESSURE: 122 MMHG | TEMPERATURE: 98.8 F | BODY MASS INDEX: 24.08 KG/M2

## 2019-06-19 DIAGNOSIS — E78.5 DYSLIPIDEMIA: Primary | ICD-10-CM

## 2019-06-19 DIAGNOSIS — I48.91 ATRIAL FIBRILLATION WITH RVR (HCC): ICD-10-CM

## 2019-06-19 DIAGNOSIS — Z98.890 HISTORY OF CARDIOVERSION: ICD-10-CM

## 2019-06-19 PROBLEM — T17.208A FOREIGN BODY IN PHARYNX: Status: RESOLVED | Noted: 2018-05-11 | Resolved: 2019-06-19

## 2019-06-19 PROCEDURE — 99214 OFFICE O/P EST MOD 30 MIN: CPT | Performed by: NURSE PRACTITIONER

## 2019-06-19 PROCEDURE — 3008F BODY MASS INDEX DOCD: CPT | Performed by: NURSE PRACTITIONER

## 2019-06-19 PROCEDURE — 1036F TOBACCO NON-USER: CPT | Performed by: NURSE PRACTITIONER

## 2019-06-30 DIAGNOSIS — I48.91 ATRIAL FIBRILLATION WITH RVR (HCC): ICD-10-CM

## 2019-06-30 RX ORDER — METOPROLOL SUCCINATE 50 MG/1
TABLET, EXTENDED RELEASE ORAL
Qty: 30 TABLET | Refills: 8 | Status: SHIPPED | OUTPATIENT
Start: 2019-06-30 | End: 2020-04-20 | Stop reason: SDUPTHER

## 2019-07-24 DIAGNOSIS — E78.2 MIXED HYPERLIPIDEMIA: ICD-10-CM

## 2019-07-24 RX ORDER — EZETIMIBE 10 MG/1
10 TABLET ORAL DAILY
Qty: 30 TABLET | Refills: 3 | Status: SHIPPED | OUTPATIENT
Start: 2019-07-24 | End: 2019-12-09 | Stop reason: SDUPTHER

## 2019-10-15 NOTE — PROGRESS NOTES
Cardiology Follow Up    Amor Lucero  1970  [id number]  [address] [facility] [address] [address]    1  History of atrial fibrillation with RVR ([address] )     2  History of cardioversion     3  Dyslipidemia         Discussion/Summary:  Mr Kaden Sharma is a pleasant 45-year-old gentleman who presents to the office today for routine follow-up  Since his last visit cardiac wise he has been feeling well  He offers no cardiopulmonary complaints  He continues to maintain normal sinus rhythm on his current AV shea blocking regimen to which no changes were made  Given his low [id number] score he is not maintained on systemic anticoagulation  He was placed on statin therapy by his primary care provider due to dyslipidemia  He was intolerant due to myalgias  He is tolerating Zetia without side effects  His most recent lipids were reviewed  His LDL has much improved on Zetia but remains above goal   Therapeutic lifestyle modifications were recommended and discussed  No other testing is indicated  I will see him in the office in one year or sooner if deemed necessary  Interval History:   Mr Kaden Sharma is a 45-year-old gentleman who presents to the office today for routine follow-up  Since his last visit he has been feeling well  He is active at his job  With the activity he performs he denies any exertional chest pain or shortness of breath  He denies any recurrent syncope  He denies any signs or symptoms of congestive heart failure including increasing lower extremity edema, paroxysmal nocturnal dyspnea, orthopnea, acute weight gain or increasing abdominal girth  He denies palpitations or symptoms suggestive of recurrent atrial fibrillation  He denies any symptoms of claudication  He remains tobacco free      Problem List     Atrial fibrillation with RVR ([address] ) Syncope and collapse    Smoker    Acute pain of left shoulder    Facial laceration    Hypokalemia    Leukocytosis    Foreign body in pharynx    Fall at home, initial encounter        Past Medical History:   Diagnosis Date    H/O von Willebrand's disease     Migraines     Wrist fracture     left     Social History     Socioeconomic History    Marital status: /Civil Union     Spouse name: Not on file    Number of children: Not on file    Years of education: Not on file    Highest education level: Not on file   Occupational History    Not on file   Social Needs    Financial resource strain: Not on file    Food insecurity:     Worry: Not on file     Inability: Not on file    Transportation needs:     Medical: Not on file     Non-medical: Not on file   Tobacco Use    Smoking status: Former Smoker     Packs/day: 1 00     Types: Cigarettes     Last attempt to quit: 2018     Years since quittin 4    Smokeless tobacco: Never Used   Substance and Sexual Activity    Alcohol use: Yes     Comment: occasional    Drug use: No    Sexual activity: Not on file   Lifestyle    Physical activity:     Days per week: Not on file     Minutes per session: Not on file    Stress: Not on file   Relationships    Social connections:     Talks on phone: Not on file     Gets together: Not on file     Attends Mosque service: Not on file     Active member of club or organization: Not on file     Attends meetings of clubs or organizations: Not on file     Relationship status: Not on file    Intimate partner violence:     Fear of current or ex partner: Not on file     Emotionally abused: Not on file     Physically abused: Not on file     Forced sexual activity: Not on file   Other Topics Concern    Not on file   Social History Narrative    Not on file      Family History   Problem Relation Age of Onset    Breast cancer Mother     Heart attack Father      Past Surgical History:   Procedure Laterality Date  VT CARDIOVERSION ELECTIVE ARRHYTHMIA EXTERNAL N/A 5/15/2018    Procedure: CARDIOVERSION;  Surgeon: Gato Albarado DO;  Location: [facility];  Service: Cardiology    VT ECHO TRANSESOPHAG R-T 2D W/PRB IMG ALICIA I&R N/A 5/15/2018    Procedure: TRANSESOPHAGEAL ECHOCARDIOGRAM (AGNIESZKA);   Surgeon: Gato Albarado DO;  Location: [facility];  Service: Cardiology    WISDOM TOOTH EXTRACTION         Current Outpatient Medications:     ALPRAZolam (XANAX) 0 25 mg tablet, Take 1 tablet (0 25 mg total) by mouth 2 (two) times a day as needed for anxiety, Disp: 60 tablet, Rfl: 0    aspirin (ECOTRIN LOW STRENGTH) 81 mg EC tablet, Take 81 mg by mouth daily, Disp: , Rfl:     ezetimibe (ZETIA) 10 mg tablet, Take 1 tablet (10 mg total) by mouth daily, Disp: 30 tablet, Rfl: 3    metoprolol succinate (TOPROL-XL) 50 mg 24 hr tablet, take 1 tablet by mouth once daily, Disp: 30 tablet, Rfl: 8  Allergies   Allergen Reactions    Atorvastatin        Labs:     Chemistry        Component Value Date/Time    K 4 3 01/26/2019 0826     01/26/2019 0826    CO2 28 01/26/2019 0826    BUN 11 01/26/2019 0826    CREATININE 0 96 01/26/2019 0826        Component Value Date/Time    CALCIUM 9 0 01/26/2019 0826    ALKPHOS 103 01/26/2019 0826    AST 19 01/26/2019 0826    ALT 41 01/26/2019 0826            No results found for: CHOL  Lab Results   Component Value Date    HDL 37 (L) 03/23/2019    HDL 47 12/06/2018    HDL 42 01/15/2016     Lab Results   Component Value Date    LDLCALC 111 (H) 03/23/2019    LDLCALC 174 (H) 12/06/2018    LDLCALC 113 (H) 01/15/2016     Lab Results   Component Value Date    TRIG 111 03/23/2019    TRIG 192 (H) 12/06/2018    TRIG 72 01/15/2016     No results found for: CHOLHDL    Imaging: Cta Head And Neck W Wo Contrast    Result Date: 5/13/2018  Narrative: CTA NECK AND BRAIN WITH AND WITHOUT CONTRAST INDICATION: syncope and collapse COMPARISON:   5/11/2018 head CT TECHNIQUE:  Routine CT imaging of the Brain without contrast   Post contrast imaging was performed after administration of iodinated contrast through the neck and brain  Post contrast axial 0 625 mm images timed to opacify the arterial system  3D rendering was performed on an independent workstation  MIP reconstructions performed  Coronal reconstructions were performed of the noncontrast portion of the brain  Radiation dose length product (DLP) for this visit:  4436 mGy-cm   This examination, like all CT scans performed in the [facility], was performed utilizing techniques to minimize radiation dose exposure, including the use of iterative reconstruction and automated exposure control  IV Contrast:  100 mL of iohexol (OMNIPAQUE)  IMAGE QUALITY:   Diagnostic FINDINGS: NONCONTRAST BRAIN PARENCHYMA:  No intracranial mass, mass effect or midline shift  No acute intracranial hemorrhage  No CT signs of acute infarction  VENTRICLES AND EXTRA-AXIAL SPACES:  Normal for patient's age  VISUALIZED ORBITS AND PARANASAL SINUSES:  Unremarkable  CALVARIUM AND EXTRACRANIAL SOFT TISSUES:   Normal  CERVICAL VASCULATURE AORTIC ARCH AND GREAT VESSELS:  Normal aortic arch and great vessel origins  Normal visualized subclavian vessels  RIGHT VERTEBRAL ARTERY CERVICAL SEGMENT:  Normal origin  The vessel is normal in caliber throughout the neck  LEFT VERTEBRAL ARTERY CERVICAL SEGMENT:  Normal origin  The vessel is normal in caliber throughout the neck  RIGHT EXTRACRANIAL CAROTID SEGMENT:  Normal caliber common carotid artery  Normal bifurcation and cervical internal carotid artery  No stenosis or dissection  LEFT EXTRACRANIAL CAROTID SEGMENT:  Normal caliber common carotid artery  Normal bifurcation and cervical internal carotid artery  No stenosis or dissection  NASCET criteria was used to determine the degree of internal carotid artery diameter stenosis   INTRACRANIAL VASCULATURE INTERNAL CAROTID ARTERIES:  Normal enhancement of the intracranial portions of the internal carotid arteries  Normal ophthalmic artery origins  Normal ICA terminus  ANTERIOR CIRCULATION:  Symmetric A1 segments and anterior cerebral arteries with normal enhancement  Normal anterior communicating artery  MIDDLE CEREBRAL ARTERY CIRCULATION:  M1 segment and middle cerebral artery branches demonstrate normal enhancement bilaterally  DISTAL VERTEBRAL ARTERIES:  Normal distal vertebral arteries  Posterior inferior cerebellar artery origins are normal  Normal vertebral basilar junction  BASILAR ARTERY:  Basilar artery is normal in caliber  Normal superior cerebellar arteries  POSTERIOR CEREBRAL ARTERIES: Both posterior cerebral arteries arises from the basilar tip  Both arteries demonstrate normal enhancement  Normal posterior communicating arteries  DURAL VENOUS SINUSES:  Normal  NON VASCULAR ANATOMY BONY STRUCTURES:  No acute osseous abnormality  SOFT TISSUES OF THE NECK:  Unremarkable  THORACIC INLET:  Unremarkable  Impression: No significant brain parenchymal abnormalities, consistent with prior study No acute intracranial hemorrhage or mass effect No evidence of significant stenosis, dissection or occlusion involving cervical carotid or vertebral segments or visualized cerebral arteries Workstation performed: [id number]     Xr Neck Soft Tissue    Result Date: 5/13/2018  Narrative: NECK  SOFT TISSUE EXAMINATION INDICATION:   Foreign body  COMPARISON:  None VIEWS:  XR NECK SOFT TISSUE FINDINGS: The epiglottis and aryepiglottic folds are unremarkable in appearance  No radiopaque foreign bodies are appreciated  There are no osseous abnormalities  Impression: Unremarkable study  Workstation performed: [id number]     Xr Chest 1 View Portable    Result Date: 5/11/2018  Narrative: CHEST INDICATION:   Trauma   COMPARISON:  None EXAM PERFORMED/VIEWS:  XR CHEST PORTABLE  AP supine portable FINDINGS: Questionable superior mediastinal widening which could be from brachiocephalic vessels/positioning, follow-up with repeat PA and lateral chest x-rays  The lungs are clear  No pleural effusion  Osseous structures appear within normal limits for patient age  Impression: Questionable superior mediastinal widening which could be from brachiocephalic vessels/positioning, follow-up with repeat PA and lateral chest x-rays  Workstation performed: [id number]     Xr Chest 2 Views    Result Date: 5/11/2018  Narrative: CHEST INDICATION: Trauma, follow-up COMPARISON:  Previous exam same date EXAM PERFORMED/VIEWS:  XR CHEST PA & LATERAL FINDINGS: Persistent somewhat oblique right paratracheal density possibly representing brachiocephalic vessels  Adenopathy could appear similarly  Vascular injury considered less likely  Normal cardiac silhouette  The lungs are clear  No pneumothorax or pleural effusion  Osseous structures appear within normal limits for patient age  Impression: Persistent right paratracheal density suspicious for brachiocephalic vessels or adenopathy  Vascular injury considered less likely Workstation performed: [id number]     Xr Shoulder 2+ Views Left    Result Date: 5/11/2018  Narrative: LEFT SHOULDER INDICATION:   trauma  COMPARISON:  None VIEWS:  XR SHOULDER 2+ VW LEFT FINDINGS: There is no acute fracture or dislocation  No significant degenerative changes  No lytic or blastic lesions are seen  Soft tissues are unremarkable  Impression: No acute osseous abnormality  Workstation performed: [id number]     Ct Head Wo Contrast    Result Date: 5/11/2018  Narrative: CT BRAIN - WITHOUT CONTRAST INDICATION:   trauma  COMPARISON:  None  TECHNIQUE:  CT examination of the brain was performed  In addition to axial images, coronal 2D reformatted images were created and submitted for interpretation  Radiation dose length product (DLP) for this visit:  1077 mGy-cm     This examination, like all CT scans performed in the [facility], was performed utilizing techniques to minimize radiation dose exposure, including the use of iterative reconstruction and automated exposure control  IMAGE QUALITY:  Diagnostic  FINDINGS: PARENCHYMA:  No intracranial mass, mass effect or midline shift  No CT signs of acute infarction  No acute intracranial hemorrhage  VENTRICLES AND EXTRA-AXIAL SPACES:  Normal for the patient's age  VISUALIZED ORBITS AND PARANASAL SINUSES:  Unremarkable  CALVARIUM AND EXTRACRANIAL SOFT TISSUES:  Normal      Impression: No acute intracranial abnormality  Workstation performed: [id number]     Ct Facial Bones Wo Contrast    Result Date: 5/11/2018  Narrative: CT FACIAL BONES WITHOUT INTRAVENOUS CONTRAST INDICATION:   facial trauma  COMPARISON: None  TECHNIQUE:  Axial CT images were obtained through the facial bones with additional sagittal and coronal reconstructions  Radiation dose length product (DLP) for this visit:  358 mGy-cm   This examination, like all CT scans performed in the [facility], was performed utilizing techniques to minimize radiation dose exposure, including the use of iterative reconstruction and automated exposure control  IMAGE QUALITY:  Diagnostic  FINDINGS: FACIAL BONES:   No facial bone fracture identified  Normal alignment of the temporomandibular joints  No lytic or blastic lesion  ORBITS:  Orbital globes, optic nerves, and extraocular muscles appear symmetric and normal  There is no evidence of retrobulbar mass, abscess, or hematoma  SINUSES:  Normal  SOFT TISSUES:  There is soft tissue swelling at the anterior right mandibular region  Impression: No facial bone fracture or subluxation  Workstation performed: [id number]     Ct Spine Cervical Wo Contrast    Result Date: 5/11/2018  Narrative: CT CERVICAL SPINE - WITHOUT CONTRAST INDICATION:   trauma  COMPARISON: None  TECHNIQUE:  CT examination of the cervical spine was performed without intravenous contrast   Contiguous axial images were obtained    Sagittal and coronal reconstructions were performed  Radiation dose length product (DLP) for this visit:  529 mGy-cm   This examination, like all CT scans performed in the [facility], was performed utilizing techniques to minimize radiation dose exposure, including the use of iterative reconstruction and automated exposure control  IMAGE QUALITY:  Diagnostic  FINDINGS: ALIGNMENT:  Normal alignment of the cervical spine  No subluxation  VERTEBRAL BODIES:  No fracture  There are subcentimeter sclerotic densities noted at the left aspect of C1 and C4 likely representing bone islands  DEGENERATIVE CHANGES:  No significant cervical degenerative changes are noted  PREVERTEBRAL AND PARASPINAL SOFT TISSUES:  There is inspissated material noted at the dependent portion of the hypopharynx with an approximately 7 x 3 mm metallic density noted at the posterior midline the level of the piriform sinuses  THORACIC INLET:  Normal      Impression: No cervical spine fracture or traumatic malalignment  Subcentimeter metallic density noted within the dependent portion of the hypopharynx at the level of the piriform sinuses  The findings may be related to an aspirated dental amalgam   ENT consultation is suggested  I personally discussed this study with Get Morton 5/11/2018 at 7:02 PM   Workstation performed: [id number]     Cta Dissection Protocol Chest And Abdomen    Result Date: 5/11/2018  Narrative: CTA - CHEST AND ABDOMEN  - WITHOUT AND WITH IV CONTRAST INDICATION:   Chest pain  COMPARISON:  None  TECHNIQUE: CT examination of the chest and abdomen was performed both prior to and after the administration of intravenous contrast   Thin section angiographic arterial phase post contrast technique was used in order to evaluate for aortic dissection  3D reformatted images and volume rendering were performed on an independent workstation    Additionally, axial, sagittal, and coronal 2D reformatted images were created from the source data and submitted for interpretation  Radiation dose length product (DLP) for this visit:  1033 mGy-cm   This examination, like all CT scans performed in the [facility], was performed utilizing techniques to minimize radiation dose exposure, including the use of iterative reconstruction and automated exposure control  IV Contrast:  100 mL of iohexol (OMNIPAQUE) Enteric Contrast: Enteric contrast was not administered  FINDINGS: AORTA: There is no aortic dissection or intramural hematoma  There is no aortic aneurysm  There is a prominent ductus bump/outpouching at the ligamentum arteriosum, likely congenital   Superior indentation on the celiac axis best seen on the sagittal view 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 to median arcuate ligament syndrome  CHEST LUNGS:  Bibasilar subsegmental atelectasis  PLEURA:  Unremarkable  HEART/PULMONARY ARTERIAL TREE:  Heart is unremarkable for the patient's age  Within the limitations of this examination there is no evidence of pulmonary embolus  MEDIASTINUM AND HORACIO:  Unremarkable  CHEST WALL AND LOWER NECK:   Unremarkable  ABDOMEN LIVER/BILIARY TREE:  Mild hepatic steatosis and hepatomegaly  GALLBLADDER:  No calcified gallstones  No pericholecystic inflammatory change  SPLEEN:  Unremarkable  PANCREAS:  Unremarkable  ADRENAL GLANDS:  Unremarkable  KIDNEYS/URETERS:  Unremarkable  No hydronephrosis  VISUALIZED STOMACH AND BOWEL:  Mildly thickened proximal transverse colon likely from underdistention rather than colitis  VISUALIZED ABDOMINOPELVIC CAVITY:  No ascites or free intraperitoneal air  No lymphadenopathy  ABDOMINAL WALL:  Unremarkable  OSSEOUS STRUCTURES:  No acute fracture or destructive osseous lesion  Impression: 1  No evidence of aneurysm or dissection  2   Clear lungs  3   Mild hepatic steatosis and hepatomegaly  4   Superior indentation on the celiac axis best seen on the sagittal view 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 to median arcuate ligament syndrome   Workstation performed: [id number]       Review of Systems   Cardiovascular: Negative for chest pain, claudication, cyanosis, dyspnea on exertion, irregular heartbeat, leg swelling, near-syncope and orthopnea  All other systems reviewed and are negative  Vitals:    10/16/19 1529   BP: 120/76   Pulse: 68   SpO2: 95%     Vitals:    10/16/19 1529   Weight: 83 kg (182 lb 15 7 oz)     Height: 6' (182 9 cm)   Body mass index is 24 82 kg/m²      Physical Exam:  General:  Alert and cooperative, appears stated age  HEENT:  PERRLA, EOMI, no scleral icterus, no conjunctival pallor  Neck:  No lymphadenopathy, no thyromegaly, no carotid bruits, no elevated JVP  Heart:  Regular rate and rhythm, normal S1/S2, no S3/S4, no murmur  Lungs:  Clear to auscultation bilaterally   Abdomen:  Soft, non-tender, positive bowel sounds, no rebound or guarding,   no organomegaly   Extremities:  No clubbing, cyanosis or edema   Vascular:  2+ pedal pulses  Skin:  No rashes or lesions on exposed skin  Neurologic:  Cranial nerves II-XII grossly intact without focal deficits

## 2019-10-16 ENCOUNTER — OFFICE VISIT (OUTPATIENT)
Dept: CARDIOLOGY CLINIC | Facility: HOSPITAL | Age: 49
End: 2019-10-16
Payer: COMMERCIAL

## 2019-10-16 VITALS
DIASTOLIC BLOOD PRESSURE: 76 MMHG | HEIGHT: 72 IN | WEIGHT: 182.98 LBS | SYSTOLIC BLOOD PRESSURE: 120 MMHG | HEART RATE: 68 BPM | OXYGEN SATURATION: 95 % | BODY MASS INDEX: 24.78 KG/M2

## 2019-10-16 DIAGNOSIS — I48.91 ATRIAL FIBRILLATION WITH RVR (HCC): Primary | ICD-10-CM

## 2019-10-16 DIAGNOSIS — Z98.890 HISTORY OF CARDIOVERSION: ICD-10-CM

## 2019-10-16 DIAGNOSIS — E78.5 DYSLIPIDEMIA: ICD-10-CM

## 2019-10-16 PROCEDURE — 99214 OFFICE O/P EST MOD 30 MIN: CPT | Performed by: INTERNAL MEDICINE

## 2019-11-02 ENCOUNTER — APPOINTMENT (OUTPATIENT)
Dept: LAB | Facility: HOSPITAL | Age: 49
End: 2019-11-02
Payer: COMMERCIAL

## 2019-11-02 DIAGNOSIS — Z98.890 HISTORY OF CARDIOVERSION: ICD-10-CM

## 2019-11-02 DIAGNOSIS — I48.91 ATRIAL FIBRILLATION WITH RVR (HCC): ICD-10-CM

## 2019-11-02 DIAGNOSIS — E78.5 DYSLIPIDEMIA: ICD-10-CM

## 2019-11-02 LAB
ALBUMIN SERPL BCP-MCNC: 3.6 G/DL (ref 3.5–5)
ALP SERPL-CCNC: 105 U/L (ref 46–116)
ALT SERPL W P-5'-P-CCNC: 27 U/L (ref 12–78)
ANION GAP SERPL CALCULATED.3IONS-SCNC: 7 MMOL/L (ref 4–13)
AST SERPL W P-5'-P-CCNC: 20 U/L (ref 5–45)
BASOPHILS # BLD AUTO: 0.04 THOUSANDS/ΜL (ref 0–0.1)
BASOPHILS NFR BLD AUTO: 1 % (ref 0–1)
BILIRUB SERPL-MCNC: 0.4 MG/DL (ref 0.2–1)
BUN SERPL-MCNC: 13 MG/DL (ref 5–25)
CALCIUM SERPL-MCNC: 8.7 MG/DL (ref 8.3–10.1)
CHLORIDE SERPL-SCNC: 104 MMOL/L (ref 100–108)
CHOLEST SERPL-MCNC: 203 MG/DL (ref 50–200)
CO2 SERPL-SCNC: 27 MMOL/L (ref 21–32)
CREAT SERPL-MCNC: 0.94 MG/DL (ref 0.6–1.3)
EOSINOPHIL # BLD AUTO: 0.1 THOUSAND/ΜL (ref 0–0.61)
EOSINOPHIL NFR BLD AUTO: 2 % (ref 0–6)
ERYTHROCYTE [DISTWIDTH] IN BLOOD BY AUTOMATED COUNT: 11.3 % (ref 11.6–15.1)
GFR SERPL CREATININE-BSD FRML MDRD: 95 ML/MIN/1.73SQ M
GLUCOSE P FAST SERPL-MCNC: 88 MG/DL (ref 65–99)
HCT VFR BLD AUTO: 45.2 % (ref 36.5–49.3)
HDLC SERPL-MCNC: 40 MG/DL
HGB BLD-MCNC: 15.2 G/DL (ref 12–17)
IMM GRANULOCYTES # BLD AUTO: 0.01 THOUSAND/UL (ref 0–0.2)
IMM GRANULOCYTES NFR BLD AUTO: 0 % (ref 0–2)
LDLC SERPL CALC-MCNC: 132 MG/DL (ref 0–100)
LYMPHOCYTES # BLD AUTO: 2.28 THOUSANDS/ΜL (ref 0.6–4.47)
LYMPHOCYTES NFR BLD AUTO: 33 % (ref 14–44)
MCH RBC QN AUTO: 29.6 PG (ref 26.8–34.3)
MCHC RBC AUTO-ENTMCNC: 33.6 G/DL (ref 31.4–37.4)
MCV RBC AUTO: 88 FL (ref 82–98)
MONOCYTES # BLD AUTO: 1.04 THOUSAND/ΜL (ref 0.17–1.22)
MONOCYTES NFR BLD AUTO: 15 % (ref 4–12)
NEUTROPHILS # BLD AUTO: 3.4 THOUSANDS/ΜL (ref 1.85–7.62)
NEUTS SEG NFR BLD AUTO: 49 % (ref 43–75)
NONHDLC SERPL-MCNC: 163 MG/DL
NRBC BLD AUTO-RTO: 0 /100 WBCS
PLATELET # BLD AUTO: 250 THOUSANDS/UL (ref 149–390)
PMV BLD AUTO: 8.9 FL (ref 8.9–12.7)
POTASSIUM SERPL-SCNC: 4.1 MMOL/L (ref 3.5–5.3)
PROT SERPL-MCNC: 7.5 G/DL (ref 6.4–8.2)
RBC # BLD AUTO: 5.13 MILLION/UL (ref 3.88–5.62)
SODIUM SERPL-SCNC: 138 MMOL/L (ref 136–145)
TRIGL SERPL-MCNC: 155 MG/DL
TSH SERPL DL<=0.05 MIU/L-ACNC: 2.33 UIU/ML (ref 0.36–3.74)
WBC # BLD AUTO: 6.87 THOUSAND/UL (ref 4.31–10.16)

## 2019-11-02 PROCEDURE — 80061 LIPID PANEL: CPT

## 2019-11-02 PROCEDURE — 84443 ASSAY THYROID STIM HORMONE: CPT

## 2019-11-02 PROCEDURE — 80053 COMPREHEN METABOLIC PANEL: CPT

## 2019-11-02 PROCEDURE — 36415 COLL VENOUS BLD VENIPUNCTURE: CPT

## 2019-11-02 PROCEDURE — 85025 COMPLETE CBC W/AUTO DIFF WBC: CPT

## 2019-12-09 DIAGNOSIS — E78.2 MIXED HYPERLIPIDEMIA: ICD-10-CM

## 2019-12-09 RX ORDER — EZETIMIBE 10 MG/1
10 TABLET ORAL DAILY
Qty: 30 TABLET | Refills: 3 | Status: SHIPPED | OUTPATIENT
Start: 2019-12-09 | End: 2020-04-06 | Stop reason: SDUPTHER

## 2020-04-06 DIAGNOSIS — E78.2 MIXED HYPERLIPIDEMIA: ICD-10-CM

## 2020-04-06 RX ORDER — EZETIMIBE 10 MG/1
10 TABLET ORAL DAILY
Qty: 30 TABLET | Refills: 3 | Status: SHIPPED | OUTPATIENT
Start: 2020-04-06 | End: 2020-08-17

## 2020-04-20 DIAGNOSIS — I48.91 ATRIAL FIBRILLATION WITH RVR (HCC): ICD-10-CM

## 2020-04-20 RX ORDER — METOPROLOL SUCCINATE 50 MG/1
TABLET, EXTENDED RELEASE ORAL
Qty: 30 TABLET | Refills: 8 | Status: SHIPPED | OUTPATIENT
Start: 2020-04-20 | End: 2021-02-02

## 2020-06-24 ENCOUNTER — OFFICE VISIT (OUTPATIENT)
Dept: INTERNAL MEDICINE CLINIC | Facility: CLINIC | Age: 50
End: 2020-06-24
Payer: COMMERCIAL

## 2020-06-24 VITALS
HEIGHT: 72 IN | HEART RATE: 85 BPM | DIASTOLIC BLOOD PRESSURE: 80 MMHG | TEMPERATURE: 97.6 F | WEIGHT: 175.3 LBS | BODY MASS INDEX: 23.74 KG/M2 | OXYGEN SATURATION: 96 % | SYSTOLIC BLOOD PRESSURE: 108 MMHG

## 2020-06-24 DIAGNOSIS — Z98.890 HISTORY OF CARDIOVERSION: ICD-10-CM

## 2020-06-24 DIAGNOSIS — E78.5 DYSLIPIDEMIA: ICD-10-CM

## 2020-06-24 DIAGNOSIS — I48.91 ATRIAL FIBRILLATION WITH RVR (HCC): ICD-10-CM

## 2020-06-24 DIAGNOSIS — Z00.00 ROUTINE ADULT HEALTH MAINTENANCE: Primary | ICD-10-CM

## 2020-06-24 PROCEDURE — 3008F BODY MASS INDEX DOCD: CPT | Performed by: NURSE PRACTITIONER

## 2020-06-24 PROCEDURE — 99396 PREV VISIT EST AGE 40-64: CPT | Performed by: NURSE PRACTITIONER

## 2020-08-17 DIAGNOSIS — E78.2 MIXED HYPERLIPIDEMIA: ICD-10-CM

## 2020-08-17 RX ORDER — EZETIMIBE 10 MG/1
TABLET ORAL
Qty: 30 TABLET | Refills: 3 | Status: SHIPPED | OUTPATIENT
Start: 2020-08-17 | End: 2020-12-20

## 2020-09-19 ENCOUNTER — APPOINTMENT (OUTPATIENT)
Dept: LAB | Facility: HOSPITAL | Age: 50
End: 2020-09-19
Payer: COMMERCIAL

## 2020-09-19 DIAGNOSIS — I48.91 ATRIAL FIBRILLATION WITH RVR (HCC): ICD-10-CM

## 2020-09-19 DIAGNOSIS — E78.5 DYSLIPIDEMIA: ICD-10-CM

## 2020-09-19 DIAGNOSIS — Z98.890 HISTORY OF CARDIOVERSION: ICD-10-CM

## 2020-09-19 LAB
ALBUMIN SERPL BCP-MCNC: 3.7 G/DL (ref 3.5–5)
ALP SERPL-CCNC: 96 U/L (ref 46–116)
ALT SERPL W P-5'-P-CCNC: 25 U/L (ref 12–78)
ANION GAP SERPL CALCULATED.3IONS-SCNC: 7 MMOL/L (ref 4–13)
AST SERPL W P-5'-P-CCNC: 16 U/L (ref 5–45)
BASOPHILS # BLD AUTO: 0.04 THOUSANDS/ΜL (ref 0–0.1)
BASOPHILS NFR BLD AUTO: 1 % (ref 0–1)
BILIRUB SERPL-MCNC: 0.4 MG/DL (ref 0.2–1)
BUN SERPL-MCNC: 14 MG/DL (ref 5–25)
CALCIUM SERPL-MCNC: 8.9 MG/DL (ref 8.3–10.1)
CHLORIDE SERPL-SCNC: 108 MMOL/L (ref 100–108)
CHOLEST SERPL-MCNC: 159 MG/DL (ref 50–200)
CO2 SERPL-SCNC: 28 MMOL/L (ref 21–32)
CREAT SERPL-MCNC: 0.91 MG/DL (ref 0.6–1.3)
EOSINOPHIL # BLD AUTO: 0.1 THOUSAND/ΜL (ref 0–0.61)
EOSINOPHIL NFR BLD AUTO: 1 % (ref 0–6)
ERYTHROCYTE [DISTWIDTH] IN BLOOD BY AUTOMATED COUNT: 11.5 % (ref 11.6–15.1)
GFR SERPL CREATININE-BSD FRML MDRD: 99 ML/MIN/1.73SQ M
GLUCOSE P FAST SERPL-MCNC: 95 MG/DL (ref 65–99)
HCT VFR BLD AUTO: 43.9 % (ref 36.5–49.3)
HDLC SERPL-MCNC: 36 MG/DL
HGB BLD-MCNC: 14.5 G/DL (ref 12–17)
IMM GRANULOCYTES # BLD AUTO: 0.03 THOUSAND/UL (ref 0–0.2)
IMM GRANULOCYTES NFR BLD AUTO: 0 % (ref 0–2)
LDLC SERPL CALC-MCNC: 105 MG/DL (ref 0–100)
LYMPHOCYTES # BLD AUTO: 2.54 THOUSANDS/ΜL (ref 0.6–4.47)
LYMPHOCYTES NFR BLD AUTO: 34 % (ref 14–44)
MCH RBC QN AUTO: 29.3 PG (ref 26.8–34.3)
MCHC RBC AUTO-ENTMCNC: 33 G/DL (ref 31.4–37.4)
MCV RBC AUTO: 89 FL (ref 82–98)
MONOCYTES # BLD AUTO: 0.86 THOUSAND/ΜL (ref 0.17–1.22)
MONOCYTES NFR BLD AUTO: 12 % (ref 4–12)
NEUTROPHILS # BLD AUTO: 3.87 THOUSANDS/ΜL (ref 1.85–7.62)
NEUTS SEG NFR BLD AUTO: 52 % (ref 43–75)
NONHDLC SERPL-MCNC: 123 MG/DL
NRBC BLD AUTO-RTO: 0 /100 WBCS
PLATELET # BLD AUTO: 260 THOUSANDS/UL (ref 149–390)
PMV BLD AUTO: 9.2 FL (ref 8.9–12.7)
POTASSIUM SERPL-SCNC: 4.2 MMOL/L (ref 3.5–5.3)
PROT SERPL-MCNC: 7.3 G/DL (ref 6.4–8.2)
RBC # BLD AUTO: 4.95 MILLION/UL (ref 3.88–5.62)
SODIUM SERPL-SCNC: 143 MMOL/L (ref 136–145)
TRIGL SERPL-MCNC: 91 MG/DL
TSH SERPL DL<=0.05 MIU/L-ACNC: 1.3 UIU/ML (ref 0.36–3.74)
WBC # BLD AUTO: 7.44 THOUSAND/UL (ref 4.31–10.16)

## 2020-09-19 PROCEDURE — 80053 COMPREHEN METABOLIC PANEL: CPT

## 2020-09-19 PROCEDURE — 84443 ASSAY THYROID STIM HORMONE: CPT

## 2020-09-19 PROCEDURE — 80061 LIPID PANEL: CPT

## 2020-09-19 PROCEDURE — 36415 COLL VENOUS BLD VENIPUNCTURE: CPT

## 2020-09-19 PROCEDURE — 85025 COMPLETE CBC W/AUTO DIFF WBC: CPT

## 2020-10-22 ENCOUNTER — OFFICE VISIT (OUTPATIENT)
Dept: CARDIOLOGY CLINIC | Facility: HOSPITAL | Age: 50
End: 2020-10-22
Payer: COMMERCIAL

## 2020-10-22 ENCOUNTER — IMMUNIZATIONS (OUTPATIENT)
Dept: INTERNAL MEDICINE CLINIC | Facility: CLINIC | Age: 50
End: 2020-10-22
Payer: COMMERCIAL

## 2020-10-22 VITALS
HEART RATE: 61 BPM | BODY MASS INDEX: 23.84 KG/M2 | SYSTOLIC BLOOD PRESSURE: 112 MMHG | HEIGHT: 72 IN | DIASTOLIC BLOOD PRESSURE: 72 MMHG | WEIGHT: 176 LBS

## 2020-10-22 DIAGNOSIS — I48.91 ATRIAL FIBRILLATION WITH RVR (HCC): Primary | ICD-10-CM

## 2020-10-22 DIAGNOSIS — E78.5 DYSLIPIDEMIA: ICD-10-CM

## 2020-10-22 DIAGNOSIS — Z23 ENCOUNTER FOR IMMUNIZATION: ICD-10-CM

## 2020-10-22 DIAGNOSIS — Z98.890 HISTORY OF CARDIOVERSION: ICD-10-CM

## 2020-10-22 PROCEDURE — 90471 IMMUNIZATION ADMIN: CPT

## 2020-10-22 PROCEDURE — 1036F TOBACCO NON-USER: CPT | Performed by: INTERNAL MEDICINE

## 2020-10-22 PROCEDURE — 93000 ELECTROCARDIOGRAM COMPLETE: CPT | Performed by: INTERNAL MEDICINE

## 2020-10-22 PROCEDURE — 90686 IIV4 VACC NO PRSV 0.5 ML IM: CPT

## 2020-10-22 PROCEDURE — 99214 OFFICE O/P EST MOD 30 MIN: CPT | Performed by: INTERNAL MEDICINE

## 2020-12-20 DIAGNOSIS — E78.2 MIXED HYPERLIPIDEMIA: ICD-10-CM

## 2020-12-20 RX ORDER — EZETIMIBE 10 MG/1
TABLET ORAL
Qty: 30 TABLET | Refills: 3 | Status: SHIPPED | OUTPATIENT
Start: 2020-12-20 | End: 2021-04-20

## 2021-01-08 ENCOUNTER — TELEMEDICINE (OUTPATIENT)
Dept: INTERNAL MEDICINE CLINIC | Facility: CLINIC | Age: 51
End: 2021-01-08
Payer: COMMERCIAL

## 2021-01-08 VITALS — HEART RATE: 92 BPM | OXYGEN SATURATION: 98 %

## 2021-01-08 DIAGNOSIS — Z20.822 EXPOSURE TO COVID-19 VIRUS: Primary | ICD-10-CM

## 2021-01-08 PROCEDURE — U0003 INFECTIOUS AGENT DETECTION BY NUCLEIC ACID (DNA OR RNA); SEVERE ACUTE RESPIRATORY SYNDROME CORONAVIRUS 2 (SARS-COV-2) (CORONAVIRUS DISEASE [COVID-19]), AMPLIFIED PROBE TECHNIQUE, MAKING USE OF HIGH THROUGHPUT TECHNOLOGIES AS DESCRIBED BY CMS-2020-01-R: HCPCS | Performed by: NURSE PRACTITIONER

## 2021-01-08 PROCEDURE — 99213 OFFICE O/P EST LOW 20 MIN: CPT | Performed by: NURSE PRACTITIONER

## 2021-01-08 PROCEDURE — U0005 INFEC AGEN DETEC AMPLI PROBE: HCPCS | Performed by: NURSE PRACTITIONER

## 2021-01-08 NOTE — LETTER
January 8, 2021     Patient: Indu Lopez   YOB: 1970   Date of Visit: 1/8/2021       To Whom it May Concern:    Indu Lopez is under my professional care  He was seen in my office on 1/8/2021  Tasha Burgess was tested for Covid, and will be off of work until further notice  If you have any questions or concerns, please don't hesitate to call           Sincerely,              Dedrick Ellis

## 2021-01-08 NOTE — PROGRESS NOTES
COVID-19 Virtual Visit     Assessment/Plan: Patient has positive exposure at work on Monday  Employee did test positive  He is per their protocol to be tested  Will obtain swab and did advise to self isolate  Will download my chart for results as well  Will notify once swab is back  Problem List Items Addressed This Visit     None      Visit Diagnoses     Exposure to COVID-19 virus    -  Primary    Relevant Orders    Novel Coronavirus (COVID-19), PCR [facility] - Collected in Office         Disposition:     I have spent 15 minutes directly with the patient  Encounter provider Latoya 1690, [facility]    Provider located at [address] [place] [address]  [facility]  [id number]    Recent Visits  No visits were found meeting these conditions  Showing recent visits within past 7 days and meeting all other requirements     Today's Visits  Date Type Provider Dept   01/08/21 Telemedicine [facility]AN[facility]   Showing today's visits and meeting all other requirements     Future Appointments  No visits were found meeting these conditions  Showing future appointments within next 150 days and meeting all other requirements        Patient agrees to participate in a virtual check in via telephone or video visit instead of presenting to the office to address urgent/immediate medical needs  Patient is aware this is a billable service  After connecting through Telephone, the patient was identified by name and date of birth  Vanessa Martini was informed that this was a telemedicine visit and that the exam was being conducted confidentially over secure lines  My office door was closed  No one else was in the room  Vanessa Martini acknowledged consent and understanding of privacy and security of the telemedicine visit   I informed the patient that I have reviewed his record in Epic and presented the opportunity for him to ask any questions regarding the visit today  The patient agreed to participate  It was my intent to perform this visit via video technology but the patient was not able to do a video connection so the visit was completed via audio telephone only  Subjective:   Vanessa Martini is a 53 y o  male who is concerned about COVID-19  Patient is currently asymptomatic  Patient denies fever, chills, fatigue, malaise, congestion, rhinorrhea, sore throat, anosmia, loss of taste, cough, shortness of breath, chest tightness, abdominal pain, nausea, vomiting, diarrhea, myalgias and headaches  Exposure:   Contact with a person who is under investigation (PUI) for or who is positive for COVID-19 within the last 14 days?: Yes    Hospitalized recently for fever and/or lower respiratory symptoms?: No      Currently a healthcare worker that is involved in direct patient care?: No      Works in a special setting where the risk of COVID-19 transmission may be high? (this may include long-term care, correctional and long-term facilities; homeless shelters; assisted-living facilities and group homes ): No      Resident in a special setting where the risk of COVID-19 transmission may be high? (this may include long-term care, correctional and long-term facilities; homeless shelters; assisted-living facilities and group homes ): No      No results found for: Gui Cheathamsebastian, [address] [place] [address], [address] [place] [address],[facility] [address] [address]  Past Medical History:   Diagnosis Date    H/O von Willebrand's disease     Migraines     Wrist fracture     left     Past Surgical History:   Procedure Laterality Date    [place] CARDIOVERSION ELECTIVE ARRHYTHMIA EXTERNAL N/A 5/15/2018    Procedure: CARDIOVERSION;  Surgeon: Delmer Gaston DO;  [facility];  Service: Cardiology    [place] ECHO TRANSESOPHAG R-T 2D W/PRB IMG ACQUISJ I&R N/A 5/15/2018    Procedure: TRANSESOPHAGEAL ECHOCARDIOGRAM (AGNIESZKA);   Surgeon: Delmer Gaston DO;  Location: [facility];  Service: Cardiology    WISDOM TOOTH EXTRACTION       Current Outpatient Medications   Medication Sig Dispense Refill    ALPRAZolam (XANAX) 0 25 mg tablet Take 1 tablet (0 25 mg total) by mouth 2 (two) times a day as needed for anxiety (Patient not taking: Reported on 10/22/2020) 60 tablet 0    aspirin (ECOTRIN LOW STRENGTH) 81 mg EC tablet Take 81 mg by mouth daily      ezetimibe (ZETIA) 10 mg tablet take 1 tablet by mouth once daily 30 tablet 3    metoprolol succinate (TOPROL-XL) 50 mg 24 hr tablet take 1 tablet by mouth once daily 30 tablet 8     No current facility-administered medications for this visit  Allergies   Allergen Reactions    Atorvastatin        Review of Systems   Constitutional: Negative for chills, fatigue and fever  HENT: Negative for congestion, rhinorrhea and sore throat  Respiratory: Negative for cough, chest tightness and shortness of breath  Gastrointestinal: Negative for abdominal pain, diarrhea, nausea and vomiting  Musculoskeletal: Negative for myalgias  Neurological: Negative for headaches  Objective:    Vitals:    01/08/21 1245   Pulse: 92   SpO2: 98%       Physical Exam  Constitutional:       Appearance: Normal appearance  He is normal weight  Neurological:      Mental Status: He is alert  Psychiatric:         Mood and Affect: Mood normal          Behavior: Behavior normal          Thought Content: Thought content normal          Judgment: Judgment normal        VIRTUAL VISIT DISCLAIMER    Misha Ojeda acknowledges that he has consented to an online visit or consultation  He understands that the online visit is based solely on information provided by him, and that, in the absence of a face-to-face physical evaluation by the physician, the diagnosis he receives is both limited and provisional in terms of accuracy and completeness  This is not intended to replace a full medical face-to-face evaluation by the physician   Misha Ojeda understands and accepts these terms

## 2021-01-10 LAB — SARS-COV-2 RNA SPEC QL NAA+PROBE: NOT DETECTED

## 2021-02-02 DIAGNOSIS — I48.91 ATRIAL FIBRILLATION WITH RVR (HCC): ICD-10-CM

## 2021-02-02 RX ORDER — METOPROLOL SUCCINATE 50 MG/1
TABLET, EXTENDED RELEASE ORAL
Qty: 30 TABLET | Refills: 8 | Status: SHIPPED | OUTPATIENT
Start: 2021-02-02 | End: 2021-11-08

## 2021-04-20 DIAGNOSIS — E78.2 MIXED HYPERLIPIDEMIA: ICD-10-CM

## 2021-04-20 RX ORDER — EZETIMIBE 10 MG/1
TABLET ORAL
Qty: 30 TABLET | Refills: 3 | Status: SHIPPED | OUTPATIENT
Start: 2021-04-20 | End: 2021-08-18

## 2021-05-06 NOTE — H&P
[facility]  H&P- Jennifer Engel 1970, 53 y o  male MRN: [id number]    Unit/Bed#:  Encounter: [id number]    Primary Care Provider: Zafar Rogers MD   Date and time admitted to hospital: 5/11/2018  5:45 PM        Syncope and collapse   Assessment & Plan    -Occurred today while at home   Was outdoors doing yard cleaning and passed out  -Reports being found by wife, not sure of duration he was on ground  -No prior hx of Syncope, seizures  -Probably secondary to new onset arrhythmia versus dehydration  -IV normal saline   -Neuro checks, orthostatic vital signs  -Continue management of A-fib with RVR  -AM labs  -Supportive care        * Atrial fibrillation with RVR ([facility] [address] )   Assessment & Plan    -This is probably new onset as patient denies prior hx   -Discovered by EMS after syncope/fall with facial injuries  -Started on cardizem drip in ER  -Admit to ICU  -Telemetry monitoring  -Continue Cardizem Drip  -IV normal saline  -AM labs  -ECHO  -Cardiology consult        Hypokalemia   Assessment & Plan    -Potassium at 2 9  -Replace potassium  -AM CMP        Facial laceration   Assessment & Plan    -Sustained during fall at home  -No associated facial bone fracture, or severe bleeding  -Head CT- No acute intracranial abnormality   -No visual disturbances  -Sutures applied in ER  -monitor for new onset bleeding, headaches  -Supportive care        Leukocytosis   Assessment & Plan    -WBC at 12 18  -No fever, chills or immediate source of infection  -IV normal saline  -AM CBC        Acute pain of left shoulder   Assessment & Plan    -pain secondary to fall  -No fracture noted on X-ray  -Pain management PRN        Smoker   Assessment & Plan    -Smoking cessation counseling  -Nicotine patch        Abnormal finding on CT scan   Assessment & Plan    -S/P syncope with collapse and facial injuries  -CT of cervical spine shows-Subcentimeter metallic density noted within the dependent portion of the hypopharynx at the level of the piriform sinuses  The findings may be related to an aspirated dental amalgam   ENT consultation is suggested   -No airway compromise, no difficulty swallowing  -Consider repeat imaging study  -ENT consult for further recomendations                  VTE Prophylaxis: Low risk after VTE screen  / reason for no mechanical VTE prophylaxis low risk after VTE screen   Code Status: Level 1 Full code  POLST: POLST is not applicable to this patient    Anticipated Length of Stay:  Patient will be admitted on an Inpatient basis with an anticipated length of stay of  > 2 midnights  Justification for Hospital Stay:  Atrial fibrillation with RVR, syncope with collapse, hypokalemia  Total Time for Visit, including Counseling / Coordination of Care: 30 minutes  Greater than 50% of this total time spent on direct patient counseling and coordination of care  Chief Complaint:   S/P fall with facial lacerations    History of Present Illness:    Sandy Mcpherson is a 53 y o  male who presents to the emergency room brought in by EMS status post syncope while at home sustaining facial lacerations  Patient reports being at home doing all the cleaning then remembered waking up in his yard bleeding from his face  His wife states that she heard a thump but did not check upon it immediately upon checking a few minutes later she found them her lying on the ground  He was found to be in rapid AFib by EMS  Rate low at without intervention but became a rapid again upon arrival in emergency room  Patient denies feeling ill her having any dizziness weakness, palpitation or chest pain prior to incident  He also denies having prior history of atrial fibrillation  Labs completed in emergency room with results as shown below  Imaging studies completed with results as shown below       At bedside patient is awake and alert complains about pain to the left shoulder denies any dizziness, visual disturbances, headache nausea  Patient has been admitted to CCU for further workup and management of rapid AFib with RVR possibly new onset, syncope, hypokalemia  Review of Systems:    Review of Systems   Constitutional: Negative for activity change, chills, fever and unexpected weight change  HENT: Negative for congestion, rhinorrhea, sore throat, trouble swallowing and voice change  Eyes: Negative  Respiratory: Negative for apnea, cough, choking, shortness of breath and wheezing  Cardiovascular: Negative for chest pain, palpitations and leg swelling  Gastrointestinal: Negative for abdominal distention, abdominal pain, diarrhea, nausea and vomiting  Endocrine: Negative  Genitourinary: Negative for difficulty urinating, dysuria, flank pain, frequency, hematuria and urgency  Musculoskeletal: Negative for arthralgias, back pain, gait problem, neck pain and neck stiffness  Skin: Negative for color change, pallor and rash  Neurological: Positive for syncope  Negative for dizziness, weakness, light-headedness and headaches  Hematological: Negative  Psychiatric/Behavioral: Negative for agitation, confusion and sleep disturbance  The patient is not nervous/anxious  Past Medical and Surgical History:     Past Medical History:   Diagnosis Date    H/O von Willebrand's disease     Wrist fracture     left       Past Surgical History:   Procedure Laterality Date    WISDOM TOOTH EXTRACTION         Meds/Allergies:    Prior to Admission medications    Not on File     I have reviewed home medications with patient personally      Allergies: No Known Allergies    Social History:     Marital Status: /Civil Union   Occupation:    Patient Pre-hospital Living Situation:  Lives with family  Patient Pre-hospital Level of Mobility:  Active  Patient Pre-hospital Diet Restrictions:  None reported  Substance Use History:   History   Alcohol Use No     History   Smoking Status    Current Every Day Smoker    Packs/day: 1 00    Types: Cigarettes   Smokeless Tobacco    Never Used     History   Drug Use No       Family History:    History reviewed  No pertinent family history  Physical Exam:     Vitals:   Blood Pressure: 129/93 (05/11/18 2149)  Pulse: 101 (05/11/18 2149)  Temperature: 99 °F (37 2 °C) (05/11/18 1901)  Temp Source: Temporal (05/11/18 1901)  Respirations: 21 (05/11/18 2149)  Height: 5' 11" (180 3 cm) (05/11/18 1749)  Weight - Scale: 70 7 kg (155 lb 13 8 oz) (05/11/18 2245)  SpO2: 99 % (05/11/18 2149)    Physical Exam   Constitutional: He is oriented to person, place, and time  He appears well-developed and well-nourished  No distress  HENT:   Head: Normocephalic  Right facial lacerations, bruising   Eyes: EOM are normal  Pupils are equal, round, and reactive to light  Right eye exhibits no discharge  Left eye exhibits no discharge  Neck: Normal range of motion  Neck supple  No JVD present  No tracheal deviation present  Cardiovascular:   Tachycardic, irregularly irregular rhythm   Pulmonary/Chest: Effort normal and breath sounds normal  No stridor  No respiratory distress  He has no wheezes  Abdominal: Soft  Bowel sounds are normal  He exhibits no distension  There is no tenderness  Musculoskeletal: Normal range of motion  He exhibits no edema  Bruising to bilateral knees   Lymphadenopathy:     He has no cervical adenopathy  Neurological: He is oriented to person, place, and time  No cranial nerve deficit  Decreased ROM to left shoulder, no swelling or deformity    Skin: Skin is warm and dry  No rash noted  No erythema  Psychiatric: He has a normal mood and affect  Additional Data:     Lab Results: I have personally reviewed pertinent reports          Results from last 7 days  Lab Units 05/11/18  1759   WBC Thousand/uL 12 18*   HEMOGLOBIN g/dL 15 0   HEMATOCRIT % 40 6   PLATELETS Thousands/uL 302   NEUTROS PCT % 42*   LYMPHS PCT % 47*   MONOS PCT % 10   EOS PCT % 1       Results from last 7 days  Lab Units 05/11/18  1759   SODIUM mmol/L 139   POTASSIUM mmol/L 2 9*   CHLORIDE mmol/L 102   CO2 mmol/L 23   BUN mg/dL 12   CREATININE mg/dL 1 07   CALCIUM mg/dL 8 7   GLUCOSE RANDOM mg/dL 182*       Results from last 7 days  Lab Units 05/11/18  1759   INR  1 06       Results from last 7 days  Lab Units 05/11/18  1805   POC GLUCOSE mg/dl 136           Imaging: I have personally reviewed pertinent reports  CTA dissection protocol chest and abdomen   Final Result by Elizabeth Coleman MD (05/11 2217)         1  No evidence of aneurysm or dissection  2   Clear lungs  3   Mild hepatic steatosis and hepatomegaly  4   Superior indentation on the celiac axis best seen on the sagittal view 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 to median arcuate ligament syndrome  Workstation performed: [id number]         XR chest 2 views   Final Result by Joanne Weinstein MD (05/11 2056)   Persistent right paratracheal density suspicious for brachiocephalic vessels or adenopathy  Vascular injury considered less likely            Workstation performed: [id number]         XR chest 1 view portable   Final Result by Elizabeth Coleman MD (05/11 1944)      Questionable superior mediastinal widening which could be from brachiocephalic vessels/positioning, follow-up with repeat PA and lateral chest x-rays  Workstation performed: [id number]         CT facial bones wo contrast   Final Result by Marsha Neal MD (05/11 1911)      No facial bone fracture or subluxation  Workstation performed: [id number]         CT head wo contrast   Final Result by Marsha Neal MD (05/11 1908)      No acute intracranial abnormality  Workstation performed: [id number]         CT spine cervical wo contrast   Final Result by Marsha Neal MD (05/11 1907)      No cervical spine fracture or traumatic malalignment        Subcentimeter metallic density noted within the dependent portion of the hypopharynx at the level of the piriform sinuses  The findings may be related to an aspirated dental amalgam   ENT consultation is suggested  I personally discussed this study with Lorretta Goldberg 5/11/2018 at 7:02 PM                    Workstation performed: [id number]         XR shoulder 2+ views LEFT   Final Result by Valerie Marte MD (05/11 1821)      No acute osseous abnormality  Workstation performed: [id number]             EKG, Pathology, and Other Studies Reviewed on Admission:   · EKG:  Atrial fibrillation with RVR    Allscripts / Epic Records Reviewed: Yes     ** Please Note: This note has been constructed using a voice recognition system   ** Attending Only

## 2021-08-18 DIAGNOSIS — E78.2 MIXED HYPERLIPIDEMIA: ICD-10-CM

## 2021-08-18 RX ORDER — EZETIMIBE 10 MG/1
TABLET ORAL
Qty: 30 TABLET | Refills: 3 | Status: SHIPPED | OUTPATIENT
Start: 2021-08-18 | End: 2021-12-27

## 2021-11-08 DIAGNOSIS — I48.91 ATRIAL FIBRILLATION WITH RVR (HCC): ICD-10-CM

## 2021-11-08 RX ORDER — METOPROLOL SUCCINATE 50 MG/1
TABLET, EXTENDED RELEASE ORAL
Qty: 30 TABLET | Refills: 8 | Status: SHIPPED | OUTPATIENT
Start: 2021-11-08

## 2021-12-10 DIAGNOSIS — Z20.822 EXPOSURE TO COVID-19 VIRUS: Primary | ICD-10-CM

## 2021-12-10 PROCEDURE — U0003 INFECTIOUS AGENT DETECTION BY NUCLEIC ACID (DNA OR RNA); SEVERE ACUTE RESPIRATORY SYNDROME CORONAVIRUS 2 (SARS-COV-2) (CORONAVIRUS DISEASE [COVID-19]), AMPLIFIED PROBE TECHNIQUE, MAKING USE OF HIGH THROUGHPUT TECHNOLOGIES AS DESCRIBED BY CMS-2020-01-R: HCPCS | Performed by: NURSE PRACTITIONER

## 2021-12-10 PROCEDURE — U0005 INFEC AGEN DETEC AMPLI PROBE: HCPCS | Performed by: NURSE PRACTITIONER

## 2021-12-26 DIAGNOSIS — E78.2 MIXED HYPERLIPIDEMIA: ICD-10-CM

## 2021-12-27 RX ORDER — EZETIMIBE 10 MG/1
TABLET ORAL
Qty: 30 TABLET | Refills: 3 | Status: SHIPPED | OUTPATIENT
Start: 2021-12-27 | End: 2022-05-04

## 2021-12-28 ENCOUNTER — OFFICE VISIT (OUTPATIENT)
Dept: CARDIOLOGY CLINIC | Facility: HOSPITAL | Age: 51
End: 2021-12-28
Payer: COMMERCIAL

## 2021-12-28 VITALS
HEART RATE: 57 BPM | DIASTOLIC BLOOD PRESSURE: 80 MMHG | SYSTOLIC BLOOD PRESSURE: 130 MMHG | BODY MASS INDEX: 24.11 KG/M2 | WEIGHT: 178 LBS | HEIGHT: 72 IN

## 2021-12-28 DIAGNOSIS — I48.91 ATRIAL FIBRILLATION WITH RVR (HCC): Primary | ICD-10-CM

## 2021-12-28 DIAGNOSIS — E78.5 DYSLIPIDEMIA: ICD-10-CM

## 2021-12-28 PROCEDURE — 93000 ELECTROCARDIOGRAM COMPLETE: CPT | Performed by: INTERNAL MEDICINE

## 2021-12-28 PROCEDURE — 99214 OFFICE O/P EST MOD 30 MIN: CPT | Performed by: INTERNAL MEDICINE

## 2021-12-28 PROCEDURE — 3008F BODY MASS INDEX DOCD: CPT | Performed by: INTERNAL MEDICINE

## 2022-03-24 ENCOUNTER — OFFICE VISIT (OUTPATIENT)
Dept: URGENT CARE | Facility: MEDICAL CENTER | Age: 52
End: 2022-03-24
Payer: COMMERCIAL

## 2022-03-24 VITALS
TEMPERATURE: 98.5 F | HEIGHT: 72 IN | DIASTOLIC BLOOD PRESSURE: 95 MMHG | BODY MASS INDEX: 24.79 KG/M2 | WEIGHT: 183 LBS | SYSTOLIC BLOOD PRESSURE: 125 MMHG | RESPIRATION RATE: 18 BRPM | OXYGEN SATURATION: 98 % | HEART RATE: 78 BPM

## 2022-03-24 DIAGNOSIS — R05.1 ACUTE COUGH: Primary | ICD-10-CM

## 2022-03-24 DIAGNOSIS — B34.9 VIRAL SYNDROME: ICD-10-CM

## 2022-03-24 PROCEDURE — 99214 OFFICE O/P EST MOD 30 MIN: CPT | Performed by: PHYSICIAN ASSISTANT

## 2022-03-24 RX ORDER — FLUTICASONE PROPIONATE 50 MCG
1 SPRAY, SUSPENSION (ML) NASAL DAILY
Qty: 16 G | Refills: 0 | Status: SHIPPED | OUTPATIENT
Start: 2022-03-24

## 2022-03-24 RX ORDER — BENZONATATE 200 MG/1
200 CAPSULE ORAL 3 TIMES DAILY PRN
Qty: 20 CAPSULE | Refills: 0 | Status: SHIPPED | OUTPATIENT
Start: 2022-03-24

## 2022-03-24 NOTE — LETTER
March 24, 2022     Patient: Good Ugalde   YOB: 1970   Date of Visit: 3/24/2022       To Whom It May Concern: It is my medical opinion that Good Ugalde should remain out of work until he has been fever free for 24 hours without fever reducing medications and symptoms have been improving  If you have any questions or concerns, please don't hesitate to call           Sincerely,        Juan Diego Willis PA-C    CC: Good Ugalde

## 2022-03-24 NOTE — PROGRESS NOTES
[facility]        NAME: Jenaro Winston is a 47 y o  male  : 1970    MRN: [id number]  DATE: 2022  TIME: 10:47 AM    Assessment and Plan   Acute cough [R05 1]  1  Acute cough  benzonatate (TESSALON) 200 MG capsule   2  Viral syndrome  fluticasone (FLONASE) 50 mcg/act nasal spray       Patient Instructions     Flonase and tessalon as prescribed  Vitamin D3 2000 IU daily  Vitamin C 1000mg twice per day  Multivitamin daily  Fluids and rest  Tylenol/Ibuprofen for pain/fever  Salt water gargles and chloraseptic spray  Warm compresses over sinuses  Steam treatment (utilize proper safety precautions when in contact with hot water/steam)  Follow up with PCP in 3-5 days  Proceed to  ER if symptoms worsen  Chief Complaint     Chief Complaint   Patient presents with    Cold Like Symptoms     flu symptoms since monday, headache, sore scartchy throat, sinus pressure, dry cough, unvaccinated for flu, pt  wife tested positive for Flu last week         History of Present Illness       Wife tested positive for influenza last week  Patient tested positive for COVID in December  URI   This is a new problem  Episode onset: Monday  There has been no fever  Associated symptoms include coughing, headaches and a sore throat  Pertinent negatives include no abdominal pain, congestion, diarrhea, ear pain, nausea, rash, rhinorrhea, sneezing, vomiting or wheezing  He has tried nothing for the symptoms  Review of Systems   Review of Systems   Constitutional: Negative for chills and fever  HENT: Positive for sinus pressure and sore throat  Negative for congestion, dental problem, ear discharge, ear pain, facial swelling, postnasal drip, rhinorrhea, sneezing and trouble swallowing  Eyes: Negative for itching  Respiratory: Positive for cough  Negative for chest tightness, shortness of breath and wheezing      Gastrointestinal: Negative for abdominal pain, constipation, diarrhea, nausea and vomiting  Musculoskeletal: Negative for myalgias  Skin: Negative for rash  Neurological: Positive for headaches  Negative for dizziness, weakness and light-headedness  Current Medications       Current Outpatient Medications:     ALPRAZolam (XANAX) 0 25 mg tablet, Take 1 tablet (0 25 mg total) by mouth 2 (two) times a day as needed for anxiety, Disp: 60 tablet, Rfl: 0    ezetimibe (ZETIA) 10 mg tablet, take 1 tablet by mouth once daily, Disp: 30 tablet, Rfl: 3    metoprolol succinate (TOPROL-XL) 50 mg 24 hr tablet, take 1 tablet by mouth once daily, Disp: 30 tablet, Rfl: 8    aspirin (ECOTRIN LOW STRENGTH) 81 mg EC tablet, Take 81 mg by mouth daily (Patient not taking: Reported on 3/24/2022 ), Disp: , Rfl:     benzonatate (TESSALON) 200 MG capsule, Take 1 capsule (200 mg total) by mouth 3 (three) times a day as needed for cough, Disp: 20 capsule, Rfl: 0    fluticasone (FLONASE) 50 mcg/act nasal spray, 1 spray into each nostril daily, Disp: 16 g, Rfl: 0    Current Allergies     Allergies as of 03/24/2022 - Reviewed 03/24/2022   Allergen Reaction Noted    Atorvastatin  06/19/2019            The following portions of the patient's history were reviewed and updated as appropriate: allergies, current medications, past family history, past medical history, past social history, past surgical history and problem list      Past Medical History:   Diagnosis Date    H/O von Willebrand's disease     Migraines     Wrist fracture     left       Past Surgical History:   Procedure Laterality Date    AL CARDIOVERSION ELECTIVE ARRHYTHMIA EXTERNAL N/A 5/15/2018    Procedure: CARDIOVERSION;  Surgeon: Leilani Vick DO;  [facility]: [facility];  Service: Cardiology    [place] ECHO TRANSESOPHAG R-T 2D W/PRB IMG ACQUISJ I&R N/A 5/15/2018    Procedure: TRANSESOPHAGEAL ECHOCARDIOGRAM (AGNIESZKA);   Surgeon: Leilani Vick DO;  Location: [facility];  Service: Cardiology    WISDOM TOOTH EXTRACTION         Family History   Problem Relation Age of Onset    Breast cancer Mother     Heart attack Father          Medications have been verified  Objective   /95   Pulse 78   Temp 98 5 °F (36 9 °C)   Resp 18   Ht 6' (1 829 m)   Wt 83 kg (183 lb)   SpO2 98%   BMI 24 82 kg/m²   No LMP for male patient  Physical Exam     Physical Exam  Vitals reviewed  Constitutional:       General: He is not in acute distress  Appearance: He is well-developed  He is not diaphoretic  HENT:      Head: Normocephalic  Right Ear: Tympanic membrane and external ear normal       Left Ear: Tympanic membrane and external ear normal       Nose: Nose normal       Mouth/Throat:      Pharynx: No oropharyngeal exudate or posterior oropharyngeal erythema  Eyes:      Conjunctiva/sclera: Conjunctivae normal    Cardiovascular:      Rate and Rhythm: Normal rate and regular rhythm  Heart sounds: Normal heart sounds  No murmur heard  No friction rub  No gallop  Pulmonary:      Effort: Pulmonary effort is normal  No respiratory distress  Breath sounds: Normal breath sounds  No wheezing or rales  Chest:      Chest wall: No tenderness  Lymphadenopathy:      Cervical: No cervical adenopathy  Skin:     General: Skin is warm  Neurological:      Mental Status: He is alert and oriented to person, place, and time  Psychiatric:         Behavior: Behavior normal          Thought Content:  Thought content normal          Judgment: Judgment normal

## 2022-03-24 NOTE — PATIENT INSTRUCTIONS
Flonase and tessalon as prescribed  Vitamin D3 2000 IU daily  Vitamin C 1000mg twice per day  Multivitamin daily  Fluids and rest  Tylenol/Ibuprofen for pain/fever  Salt water gargles and chloraseptic spray  Warm compresses over sinuses  Steam treatment (utilize proper safety precautions when in contact with hot water/steam)  Follow up with PCP in 3-5 days  Proceed to  ER if symptoms worsen  Influenza   AMBULATORY CARE:   Influenza  (the flu) is an infection caused by the influenza virus  The flu is easily spread when an infected person coughs, sneezes, or has close contact with others  You may be able to spread the flu to others for 1 week or longer after signs or symptoms appear  Common signs and symptoms include the following:   · Fever and chills    · Headaches, body aches, and muscle or joint pain    · Cough, runny nose, and sore throat    · Loss of appetite, nausea, vomiting, or diarrhea    · Tiredness    · Trouble breathing    Call your local emergency number (911 in the [address] [facility]) if:   · You have trouble breathing, and your lips look purple or blue  · You have a seizure  Seek care immediately if:   · You are dizzy, or you are urinating less or not at all  · You have a headache with a stiff neck, and you feel tired or confused  · You have new pain or pressure in your chest     · Your symptoms, such as shortness of breath, vomiting, or diarrhea, get worse  · Your symptoms, such as fever and coughing, seem to get better, but then get worse  Call your doctor if:   · You have new muscle pain or weakness  · You have questions or concerns about your condition or care  Treatment for influenza  may include any of the following:  · Acetaminophen  decreases pain and fever  It is available without a doctor's order  Ask how much to take and how often to take it  Follow directions   Read the labels of all other medicines you are using to see if they also contain acetaminophen, or ask your doctor or pharmacist  Acetaminophen can cause liver damage if not taken correctly  Do not use more than 4 grams (4,000 milligrams) total of acetaminophen in one day  · NSAIDs , such as ibuprofen, help decrease swelling, pain, and fever  This medicine is available with or without a doctor's order  NSAIDs can cause stomach bleeding or kidney problems in certain people  If you take blood thinner medicine, always ask your healthcare provider if NSAIDs are safe for you  Always read the medicine label and follow directions  · Antivirals  help fight a viral infection  Manage your symptoms:   · Rest  as much as you can to help you recover  · Drink liquids as directed  to help prevent dehydration  Ask how much liquid to drink each day and which liquids are best for you  Prevent the spread of germs:       · Wash your hands often  Wash your hands several times each day  Wash after you use the bathroom, change a child's diaper, and before you prepare or eat food  Use soap and water every time  Rub your soapy hands together, lacing your fingers  Wash the front and back of your hands, and in between your fingers  Use the fingers of one hand to scrub under the fingernails of the other hand  Wash for at least 20 seconds  Rinse with warm, running water for several seconds  Then dry your hands with a clean towel or paper towel  Use hand  that contains alcohol if soap and water are not available  Do not touch your eyes, nose, or mouth without washing your hands first          · Cover a sneeze or cough  Use a tissue that covers your mouth and nose  Throw the tissue away in a trash can right away  Use the bend of your arm if a tissue is not available  Wash your hands well with soap and water or use a hand   · Stay away from others while you are sick  Avoid crowds as much as possible  · Ask about vaccines you may need  Talk to your healthcare provider about your vaccine history   He or she will tell you which vaccines you need, and when to get them  ? Get the influenza (flu) vaccine as soon as it is available  Flu viruses change, so it is important to get a flu vaccine every year  ? Get the pneumonia vaccine if recommended  This vaccine is usually recommended every 5 years  Your provider will tell you when to get this vaccine, if needed  Follow up with your doctor as directed:  Write down your questions so you remember to ask them during your visits  © Copyright [employer] 2022 Information is for End User's use only and may not be sold, redistributed or otherwise used for commercial purposes  All illustrations and images included in CareNotes® are the copyrighted property of A D A M , Inc  or [facility][note 3] Anuja Maxwell   The above information is an  only  It is not intended as medical advice for individual conditions or treatments  Talk to your doctor, nurse or pharmacist before following any medical regimen to see if it is safe and effective for you

## 2022-05-03 DIAGNOSIS — E78.2 MIXED HYPERLIPIDEMIA: ICD-10-CM

## 2022-05-04 RX ORDER — EZETIMIBE 10 MG/1
TABLET ORAL
Qty: 30 TABLET | Refills: 3 | Status: SHIPPED | OUTPATIENT
Start: 2022-05-04

## 2022-05-28 ENCOUNTER — APPOINTMENT (OUTPATIENT)
Dept: LAB | Facility: HOSPITAL | Age: 52
End: 2022-05-28
Attending: INTERNAL MEDICINE
Payer: COMMERCIAL

## 2022-05-28 DIAGNOSIS — E78.5 DYSLIPIDEMIA: ICD-10-CM

## 2022-05-28 LAB
ALBUMIN SERPL BCP-MCNC: 3.6 G/DL (ref 3.5–5)
ALP SERPL-CCNC: 98 U/L (ref 46–116)
ALT SERPL W P-5'-P-CCNC: 41 U/L (ref 12–78)
ANION GAP SERPL CALCULATED.3IONS-SCNC: 8 MMOL/L (ref 4–13)
AST SERPL W P-5'-P-CCNC: 22 U/L (ref 5–45)
BILIRUB SERPL-MCNC: 0.37 MG/DL (ref 0.2–1)
BUN SERPL-MCNC: 13 MG/DL (ref 5–25)
CALCIUM SERPL-MCNC: 8.4 MG/DL (ref 8.3–10.1)
CHLORIDE SERPL-SCNC: 102 MMOL/L (ref 100–108)
CHOLEST SERPL-MCNC: 172 MG/DL
CO2 SERPL-SCNC: 27 MMOL/L (ref 21–32)
CREAT SERPL-MCNC: 0.91 MG/DL (ref 0.6–1.3)
GFR SERPL CREATININE-BSD FRML MDRD: 97 ML/MIN/1.73SQ M
GLUCOSE P FAST SERPL-MCNC: 95 MG/DL (ref 65–99)
HDLC SERPL-MCNC: 42 MG/DL
LDLC SERPL CALC-MCNC: 106 MG/DL (ref 0–100)
LDLC SERPL DIRECT ASSAY-MCNC: 109 MG/DL (ref 0–100)
NONHDLC SERPL-MCNC: 130 MG/DL
POTASSIUM SERPL-SCNC: 3.9 MMOL/L (ref 3.5–5.3)
PROT SERPL-MCNC: 7.3 G/DL (ref 6.4–8.2)
SODIUM SERPL-SCNC: 137 MMOL/L (ref 136–145)
TRIGL SERPL-MCNC: 118 MG/DL

## 2022-05-28 PROCEDURE — 36415 COLL VENOUS BLD VENIPUNCTURE: CPT | Performed by: INTERNAL MEDICINE

## 2022-05-28 PROCEDURE — 83721 ASSAY OF BLOOD LIPOPROTEIN: CPT

## 2022-05-28 PROCEDURE — 80053 COMPREHEN METABOLIC PANEL: CPT | Performed by: INTERNAL MEDICINE

## 2022-05-28 PROCEDURE — 80061 LIPID PANEL: CPT

## 2022-06-13 ENCOUNTER — VBI (OUTPATIENT)
Dept: ADMINISTRATIVE | Facility: OTHER | Age: 52
End: 2022-06-13

## 2022-08-16 DIAGNOSIS — I48.91 ATRIAL FIBRILLATION WITH RVR (HCC): ICD-10-CM

## 2022-08-16 RX ORDER — METOPROLOL SUCCINATE 50 MG/1
TABLET, EXTENDED RELEASE ORAL
Qty: 30 TABLET | Refills: 8 | Status: SHIPPED | OUTPATIENT
Start: 2022-08-16

## 2022-09-03 DIAGNOSIS — E78.2 MIXED HYPERLIPIDEMIA: ICD-10-CM

## 2022-09-06 RX ORDER — EZETIMIBE 10 MG/1
TABLET ORAL
Qty: 30 TABLET | Refills: 3 | Status: SHIPPED | OUTPATIENT
Start: 2022-09-06 | End: 2022-09-12

## 2022-09-09 ENCOUNTER — VBI (OUTPATIENT)
Dept: ADMINISTRATIVE | Facility: OTHER | Age: 52
End: 2022-09-09

## 2022-09-12 DIAGNOSIS — E78.2 MIXED HYPERLIPIDEMIA: ICD-10-CM

## 2022-09-12 RX ORDER — EZETIMIBE 10 MG/1
TABLET ORAL
Qty: 30 TABLET | Refills: 3 | Status: SHIPPED | OUTPATIENT
Start: 2022-09-12

## 2022-09-24 ENCOUNTER — OFFICE VISIT (OUTPATIENT)
Dept: INTERNAL MEDICINE CLINIC | Facility: CLINIC | Age: 52
End: 2022-09-24
Payer: COMMERCIAL

## 2022-09-24 VITALS
DIASTOLIC BLOOD PRESSURE: 82 MMHG | SYSTOLIC BLOOD PRESSURE: 128 MMHG | BODY MASS INDEX: 23.22 KG/M2 | HEIGHT: 72 IN | TEMPERATURE: 97.8 F | HEART RATE: 60 BPM | OXYGEN SATURATION: 98 % | WEIGHT: 171.4 LBS

## 2022-09-24 DIAGNOSIS — I48.91 ATRIAL FIBRILLATION WITH RVR (HCC): ICD-10-CM

## 2022-09-24 DIAGNOSIS — E78.5 DYSLIPIDEMIA: ICD-10-CM

## 2022-09-24 DIAGNOSIS — F41.9 ANXIETY: ICD-10-CM

## 2022-09-24 DIAGNOSIS — Z12.11 SCREENING FOR COLON CANCER: ICD-10-CM

## 2022-09-24 DIAGNOSIS — Z00.00 ROUTINE ADULT HEALTH MAINTENANCE: Primary | ICD-10-CM

## 2022-09-24 PROBLEM — R55 SYNCOPE AND COLLAPSE: Status: RESOLVED | Noted: 2018-05-11 | Resolved: 2022-09-24

## 2022-09-24 PROCEDURE — 3725F SCREEN DEPRESSION PERFORMED: CPT | Performed by: NURSE PRACTITIONER

## 2022-09-24 PROCEDURE — 99396 PREV VISIT EST AGE 40-64: CPT | Performed by: NURSE PRACTITIONER

## 2022-09-24 RX ORDER — ALPRAZOLAM 0.25 MG/1
0.25 TABLET ORAL 2 TIMES DAILY PRN
Qty: 60 TABLET | Refills: 0 | Status: SHIPPED | OUTPATIENT
Start: 2022-09-24

## 2022-09-24 NOTE — PROGRESS NOTES
Name: Vibha Santamaria      : 1970      MRN: [id number]  Encounter Provider: RANULFO Diop  Encounter Date: 2022   Encounter department: [address] [place] [address], [address]  Did have labs done in May  Continues to see Cardiology with no changes to his medications  Deferring Flu vaccine  Will renew his Xanax inquiry done and consistent with prescribing history  Will follow up in one year or sooner if need be  1  Routine adult health maintenance  -     Comprehensive metabolic panel; Future  -     CBC and differential; Future  -     TSH, 3rd generation with Free T4 reflex; Future    2  Dyslipidemia  -     Comprehensive metabolic panel; Future  -     CBC and differential; Future  -     TSH, 3rd generation with Free T4 reflex; Future  -     Lipid panel; Future    3  History of atrial fibrillation with RVR (HCC)  -     Comprehensive metabolic panel; Future  -     CBC and differential; Future  -     TSH, 3rd generation with Free T4 reflex; Future    4  Screening for colon cancer  -     Cologuard    5  Anxiety  -     ALPRAZolam (XANAX) 0 25 mg tablet; Take 1 tablet (0 25 mg total) by mouth 2 (two) times a day as needed for anxiety         Subjective      Belenlila Watkins is for a wellness  He recently did loose his Father to Cancer and would like something called in for his nerves  He is up to date on his labs and does see Cardiology  He is willing to do a Cologuard  He denies any chest pain, SOB, or palpitations  He denies any depression  He is deferring the Flu vaccine  He did have the covid vaccines  He offers no other issues  Review of Systems   All other systems reviewed and are negative        Current Outpatient Medications on File Prior to Visit   Medication Sig    ezetimibe (ZETIA) 10 mg tablet take 1 tablet by mouth once daily    metoprolol succinate (TOPROL-XL) 50 mg 24 hr tablet take 1 tablet by mouth once daily    [DISCONTINUED] ALPRAZolam (XANAX) 0 25 mg tablet Take 1 tablet (0 25 mg total) by mouth 2 (two) times a day as needed for anxiety    [DISCONTINUED] aspirin (ECOTRIN LOW STRENGTH) 81 mg EC tablet Take 81 mg by mouth daily (Patient not taking: No sig reported)    [DISCONTINUED] benzonatate (TESSALON) 200 MG capsule Take 1 capsule (200 mg total) by mouth 3 (three) times a day as needed for cough (Patient not taking: Reported on 9/24/2022)    [DISCONTINUED] fluticasone (FLONASE) 50 mcg/act nasal spray 1 spray into each nostril daily (Patient not taking: Reported on 9/24/2022)       Objective     /82 (BP Location: Left arm, Patient Position: Sitting, Cuff Size: Standard)   Pulse 60   Temp 97 8 °F (36 6 °C)   Ht 6' (1 829 m)   Wt 77 7 kg (171 lb 6 4 oz)   SpO2 98%   BMI 23 25 kg/m²     Physical Exam  Vitals reviewed  Constitutional:       Appearance: Normal appearance  He is normal weight  HENT:      Head: Normocephalic and atraumatic  Right Ear: Tympanic membrane, ear canal and external ear normal       Left Ear: Tympanic membrane, ear canal and external ear normal       Nose: Nose normal       Mouth/Throat:      Mouth: Mucous membranes are moist       Pharynx: Oropharynx is clear  Eyes:      Extraocular Movements: Extraocular movements intact  Conjunctiva/sclera: Conjunctivae normal       Pupils: Pupils are equal, round, and reactive to light  Cardiovascular:      Rate and Rhythm: Normal rate and regular rhythm  Pulses: Normal pulses  Heart sounds: Normal heart sounds  Pulmonary:      Effort: Pulmonary effort is normal       Breath sounds: Normal breath sounds  Abdominal:      General: Abdomen is flat  Bowel sounds are normal       Palpations: Abdomen is soft  Musculoskeletal:         General: Normal range of motion  Cervical back: Normal range of motion and neck supple  Skin:     General: Skin is warm and dry  Capillary Refill: Capillary refill takes less than 2 seconds     Neurological:      General: No focal deficit present  Mental Status: He is alert and oriented to person, place, and time  Mental status is at baseline  Psychiatric:         Mood and Affect: Mood normal          Behavior: Behavior normal          Thought Content:  Thought content normal          Judgment: Judgment normal        RANULFO Reilly

## 2022-11-09 LAB — COLOGUARD RESULT REPORTABLE: NEGATIVE

## 2022-11-23 PROBLEM — Z12.11 SCREENING FOR COLON CANCER: Status: RESOLVED | Noted: 2022-09-24 | Resolved: 2022-11-23

## 2023-01-11 DIAGNOSIS — E78.2 MIXED HYPERLIPIDEMIA: ICD-10-CM

## 2023-01-11 DIAGNOSIS — F41.9 ANXIETY: ICD-10-CM

## 2023-01-11 RX ORDER — EZETIMIBE 10 MG/1
10 TABLET ORAL DAILY
Qty: 30 TABLET | Refills: 0 | Status: CANCELLED | OUTPATIENT
Start: 2023-01-11

## 2023-01-16 DIAGNOSIS — E78.2 MIXED HYPERLIPIDEMIA: ICD-10-CM

## 2023-01-16 RX ORDER — EZETIMIBE 10 MG/1
10 TABLET ORAL DAILY
Qty: 30 TABLET | Refills: 3 | Status: SHIPPED | OUTPATIENT
Start: 2023-01-16

## 2023-01-17 RX ORDER — ALPRAZOLAM 0.25 MG/1
0.25 TABLET ORAL 2 TIMES DAILY PRN
Qty: 60 TABLET | Refills: 0 | Status: SHIPPED | OUTPATIENT
Start: 2023-01-17

## 2023-01-26 NOTE — PROGRESS NOTES
Cardiology Follow Up    Pierre Frost  1970  [id number]  [address] [facility] [address] [facility]  [place]  [place] [address]  [phone]    1  History of atrial fibrillation with RVR ([address] )        2  History of cardioversion        3  Dyslipidemia             Discussion/Summary:  Overall he has been feeling well from a cardiac standpoint  He had a remote episode of syncope and atrial fibrillation with RVR requiring cardioversion in 2018  He has not had any recurrent syncope nor evidence of recurrent atrial fibrillation  He will remain on metoprolol succinate as prescribed  Given remote episode and low [id number] score he remains off of anticoagulation  His lipids have overall improved with most recent direct   He was intolerant of statin therapy and remains on Zetia  His blood pressure was mildly elevated in the office today but came down to 130/86 upon my recheck  He did have a salty meal prior to his office visit and was on a rushed schedule today  He does have the capability to monitor his blood pressure at home  He will begin doing so and notify the office in the next few weeks with updated readings  A low sodium diet was reinforced  Otherwise no cardiac testing is advised at this time  He will RTO in 1 year with Dr Alicja Meehan or sooner if necessary  He will call with any concerns  Interval History:   Pierre Frost is a 47 y o  male with prior syncope in 2018 found to have new onset atrial fibrillation with RVR  s/p cardioversion and dyslipidemia who presents to the office today for routine follow up  Since his last office visit he has been feeling well  He walks approximately 19,000 steps daily as part of his job at work  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  To get from one building to the next is on a incline    He is able to perform all of this activity without any shortness of breath or chest pain/pressure/discomfort  He denies any lightheadedness, dizziness, or recurrent syncope  He denies palpitations  He denies lower extremity edema        Medical Problems     Problem List     History of atrial fibrillation with RVR (HCC)    Smoker    Leukocytosis    History of cardioversion    Anxiety    Dyslipidemia    Routine adult health maintenance        Past Medical History:   Diagnosis Date   • H/O von Willebrand's disease    • Migraines    • Wrist fracture     left     Social History     Socioeconomic History   • Marital status: /Civil Union     Spouse name: Not on file   • Number of children: Not on file   • Years of education: Not on file   • Highest education level: Not on file   Occupational History   • Not on file   Tobacco Use   • Smoking status: Former     Packs/day: 1 00     Types: Cigarettes     Quit date: 2018     Years since quittin 7   • Smokeless tobacco: Never   Vaping Use   • Vaping Use: Never used   Substance and Sexual Activity   • Alcohol use: Yes     Comment: occasional   • Drug use: No   • Sexual activity: Not on file   Other Topics Concern   • Not on file   Social History Narrative   • Not on file     Social Determinants of Health     Financial Resource Strain: Not on file   Food Insecurity: Not on file   Transportation Needs: Not on file   Physical Activity: Not on file   Stress: Not on file   Social Connections: Not on file   Intimate Partner Violence: Not on file   Housing Stability: Not on file      Family History   Problem Relation Age of Onset   • Breast cancer Mother    • Heart attack Father      Past Surgical History:   Procedure Laterality Date   • MI CARDIOVERSION ELECTIVE ARRHYTHMIA EXTERNAL N/A 5/15/2018    Procedure: CARDIOVERSION;  Surgeon: Maryland Osler, DO;  Location: [facility];  Service: Cardiology   • MI ECHO TRANSESOPHAG R-T 2D W/PRB IMG ACQUISJ I&R N/A 5/15/2018    Procedure: TRANSESOPHAGEAL ECHOCARDIOGRAM (AGNIESZKA); Surgeon: Alonso Sunshine DO;  Location: [facility];  Service: Cardiology   • WISDOM TOOTH EXTRACTION         Current Outpatient Medications:   •  ALPRAZolam (XANAX) 0 25 mg tablet, Take 1 tablet (0 25 mg total) by mouth 2 (two) times a day as needed for anxiety, Disp: 60 tablet, Rfl: 0  •  ezetimibe (ZETIA) 10 mg tablet, Take 1 tablet (10 mg total) by mouth daily, Disp: 30 tablet, Rfl: 3  •  metoprolol succinate (TOPROL-XL) 50 mg 24 hr tablet, take 1 tablet by mouth once daily, Disp: 30 tablet, Rfl: 8  Allergies   Allergen Reactions   • Atorvastatin        Labs:     Chemistry        Component Value Date/Time    K 3 9 05/28/2022 0803     05/28/2022 0803    CO2 27 05/28/2022 0803    BUN 13 05/28/2022 0803    CREATININE 0 91 05/28/2022 0803        Component Value Date/Time    CALCIUM 8 4 05/28/2022 0803    ALKPHOS 98 05/28/2022 0803    AST 22 05/28/2022 0803    ALT 41 05/28/2022 0803            No results found for: CHOL  Lab Results   Component Value Date    HDL 42 05/28/2022    HDL 36 (L) 09/19/2020    HDL 40 11/02/2019     Lab Results   Component Value Date    LDLCALC 106 (H) 05/28/2022    LDLCALC 105 (H) 09/19/2020    LDLCALC 132 (H) 11/02/2019     Lab Results   Component Value Date    TRIG 118 05/28/2022    TRIG 91 09/19/2020    TRIG 155 (H) 11/02/2019     No results found for: CHOLHDL    Imaging: No results found  ECG: normal sinus rhythm    Review of Systems   All other systems reviewed and are negative  Vitals:    01/27/23 1435   BP: 138/90   Pulse: 81     Vitals:    01/27/23 1435   Weight: 74 4 kg (164 lb)     Height: 6' (182 9 cm)   Body mass index is 22 24 kg/m²  Physical Exam:  Physical Exam  Vitals reviewed  Constitutional:       General: He is not in acute distress  Appearance: He is well-developed  He is not diaphoretic  HENT:      Head: Normocephalic and atraumatic  Eyes:      Pupils: Pupils are equal, round, and reactive to light     Neck:      Vascular: No carotid bruit  Cardiovascular:      Rate and Rhythm: Normal rate and regular rhythm  Pulses:           Radial pulses are 2+ on the right side and 2+ on the left side  Heart sounds: S1 normal and S2 normal  No murmur heard  Pulmonary:      Effort: Pulmonary effort is normal  No respiratory distress  Breath sounds: Normal breath sounds  No wheezing or rales  Abdominal:      General: There is no distension  Palpations: Abdomen is soft  Tenderness: There is no abdominal tenderness  Musculoskeletal:         General: Normal range of motion  Cervical back: Normal range of motion  Right lower leg: No edema  Left lower leg: No edema  Skin:     General: Skin is warm and dry  Findings: No erythema  Neurological:      General: No focal deficit present  Mental Status: He is alert and oriented to person, place, and time     Psychiatric:         Mood and Affect: Mood normal          Behavior: Behavior normal

## 2023-01-27 ENCOUNTER — OFFICE VISIT (OUTPATIENT)
Dept: CARDIOLOGY CLINIC | Facility: HOSPITAL | Age: 53
End: 2023-01-27

## 2023-01-27 VITALS
DIASTOLIC BLOOD PRESSURE: 86 MMHG | HEIGHT: 72 IN | SYSTOLIC BLOOD PRESSURE: 130 MMHG | HEART RATE: 81 BPM | WEIGHT: 164 LBS | BODY MASS INDEX: 22.21 KG/M2

## 2023-01-27 DIAGNOSIS — E78.5 DYSLIPIDEMIA: ICD-10-CM

## 2023-01-27 DIAGNOSIS — Z98.890 HISTORY OF CARDIOVERSION: ICD-10-CM

## 2023-01-27 DIAGNOSIS — I48.91 ATRIAL FIBRILLATION WITH RVR (HCC): Primary | ICD-10-CM

## 2023-05-23 DIAGNOSIS — E78.2 MIXED HYPERLIPIDEMIA: ICD-10-CM

## 2023-05-23 RX ORDER — EZETIMIBE 10 MG/1
TABLET ORAL
Qty: 30 TABLET | Refills: 3 | Status: SHIPPED | OUTPATIENT
Start: 2023-05-23

## 2023-06-11 DIAGNOSIS — I48.91 ATRIAL FIBRILLATION WITH RVR (HCC): ICD-10-CM

## 2023-06-12 RX ORDER — METOPROLOL SUCCINATE 50 MG/1
TABLET, EXTENDED RELEASE ORAL
Qty: 30 TABLET | Refills: 8 | Status: SHIPPED | OUTPATIENT
Start: 2023-06-12

## 2023-08-02 DIAGNOSIS — E78.2 MIXED HYPERLIPIDEMIA: ICD-10-CM

## 2023-08-03 RX ORDER — EZETIMIBE 10 MG/1
10 TABLET ORAL DAILY
Qty: 30 TABLET | Refills: 0 | Status: SHIPPED | OUTPATIENT
Start: 2023-08-03

## 2023-09-01 DIAGNOSIS — E78.2 MIXED HYPERLIPIDEMIA: ICD-10-CM

## 2023-09-01 RX ORDER — EZETIMIBE 10 MG/1
10 TABLET ORAL DAILY
Qty: 30 TABLET | Refills: 0 | Status: SHIPPED | OUTPATIENT
Start: 2023-09-01

## 2023-11-21 ENCOUNTER — TELEPHONE (OUTPATIENT)
Dept: INTERNAL MEDICINE CLINIC | Facility: CLINIC | Age: 53
End: 2023-11-21

## 2023-11-21 DIAGNOSIS — E78.2 MIXED HYPERLIPIDEMIA: ICD-10-CM

## 2023-11-21 RX ORDER — EZETIMIBE 10 MG/1
10 TABLET ORAL DAILY
Qty: 30 TABLET | Refills: 0 | Status: CANCELLED | OUTPATIENT
Start: 2023-11-21

## 2023-11-27 ENCOUNTER — OFFICE VISIT (OUTPATIENT)
Dept: INTERNAL MEDICINE CLINIC | Facility: CLINIC | Age: 53
End: 2023-11-27
Payer: COMMERCIAL

## 2023-11-27 VITALS
OXYGEN SATURATION: 99 % | SYSTOLIC BLOOD PRESSURE: 124 MMHG | WEIGHT: 163.6 LBS | TEMPERATURE: 98 F | HEART RATE: 62 BPM | BODY MASS INDEX: 22.16 KG/M2 | DIASTOLIC BLOOD PRESSURE: 76 MMHG | HEIGHT: 72 IN

## 2023-11-27 DIAGNOSIS — Z92.89 HISTORY OF CARDIOVERSION: ICD-10-CM

## 2023-11-27 DIAGNOSIS — E78.2 MIXED HYPERLIPIDEMIA: ICD-10-CM

## 2023-11-27 DIAGNOSIS — I48.91 ATRIAL FIBRILLATION WITH RVR (HCC): ICD-10-CM

## 2023-11-27 DIAGNOSIS — Z12.5 SCREENING FOR PROSTATE CANCER: ICD-10-CM

## 2023-11-27 DIAGNOSIS — E78.5 DYSLIPIDEMIA: ICD-10-CM

## 2023-11-27 DIAGNOSIS — Z00.00 ROUTINE ADULT HEALTH MAINTENANCE: Primary | ICD-10-CM

## 2023-11-27 DIAGNOSIS — F41.9 ANXIETY: ICD-10-CM

## 2023-11-27 PROCEDURE — 99396 PREV VISIT EST AGE 40-64: CPT | Performed by: NURSE PRACTITIONER

## 2023-11-27 RX ORDER — EZETIMIBE 10 MG/1
10 TABLET ORAL DAILY
Qty: 30 TABLET | Refills: 3 | Status: SHIPPED | OUTPATIENT
Start: 2023-11-27

## 2023-11-27 NOTE — PROGRESS NOTES
Name: Memo Dejesus      : 1970      MRN: [id number]  Encounter Provider: RANULFO Garcia  Encounter Date: 2023   Encounter department: [address] [place] [address] Will repeat fasting labs. Screenings up to date. Will follow up in one year or sooner if need be. 1. Routine adult health maintenance  -     Comprehensive metabolic panel; Future  -     CBC and differential; Future  -     TSH, 3rd generation with Free T4 reflex; Future    2. History of cardioversion  -     Comprehensive metabolic panel; Future  -     CBC and differential; Future  -     TSH, 3rd generation with Free T4 reflex; Future    3. History of atrial fibrillation with RVR (HCC)  -     Comprehensive metabolic panel; Future  -     CBC and differential; Future  -     TSH, 3rd generation with Free T4 reflex; Future    4. Anxiety  -     Comprehensive metabolic panel; Future  -     CBC and differential; Future  -     TSH, 3rd generation with Free T4 reflex; Future    5. Dyslipidemia  -     Comprehensive metabolic panel; Future  -     CBC and differential; Future  -     TSH, 3rd generation with Free T4 reflex; Future  -     Lipid panel; Future    6. Mixed hyperlipidemia  -     ezetimibe (ZETIA) 10 mg tablet; Take 1 tablet (10 mg total) by mouth daily    7. Screening for prostate cancer  -     PSA, Total Screen; Future        Depression Screening and Follow-up Plan: Patient was screened for depression during today's encounter. They screened negative with a PHQ-2 score of 0. Subjective Frederico Rinne is for a wellness. He is doing well and mainly needed his medications filled. He denies any chest pain, SOB, or palpitations. He denies any other issues. He is willing to go for labs. He does see Cardiology as needed. He offers no other issues. Review of Systems   All other systems reviewed and are negative.       Current Outpatient Medications on File Prior to Visit   Medication Sig ALPRAZolam (XANAX) 0.25 mg tablet Take 1 tablet (0.25 mg total) by mouth 2 (two) times a day as needed for anxiety    metoprolol succinate (TOPROL-XL) 50 mg 24 hr tablet take 1 tablet by mouth once daily    [DISCONTINUED] ezetimibe (ZETIA) 10 mg tablet take 1 tablet by mouth once daily       Objective     /76   Pulse 62   Temp 98 °F (36.7 °C) (Temporal)   Ht 6' (1.829 m)   Wt 74.2 kg (163 lb 9.6 oz)   SpO2 99%   BMI 22.19 kg/m²     Physical Exam  Vitals reviewed. Constitutional:       Appearance: Normal appearance. He is normal weight. HENT:      Head: Normocephalic and atraumatic. Right Ear: Tympanic membrane, ear canal and external ear normal.      Left Ear: Tympanic membrane, ear canal and external ear normal.      Nose: Nose normal.      Mouth/Throat:      Mouth: Mucous membranes are moist.      Pharynx: Oropharynx is clear. Eyes:      Extraocular Movements: Extraocular movements intact. Conjunctiva/sclera: Conjunctivae normal.      Pupils: Pupils are equal, round, and reactive to light. Cardiovascular:      Rate and Rhythm: Normal rate and regular rhythm. Pulses: Normal pulses. Heart sounds: Normal heart sounds. Pulmonary:      Effort: Pulmonary effort is normal.      Breath sounds: Normal breath sounds. Abdominal:      General: Abdomen is flat. Bowel sounds are normal.      Palpations: Abdomen is soft. Musculoskeletal:         General: Normal range of motion. Cervical back: Normal range of motion and neck supple. Skin:     General: Skin is warm and dry. Capillary Refill: Capillary refill takes less than 2 seconds. Neurological:      General: No focal deficit present. Mental Status: He is alert and oriented to person, place, and time. Mental status is at baseline. Psychiatric:         Mood and Affect: Mood normal.         Behavior: Behavior normal.         Thought Content:  Thought content normal.         Judgment: Judgment normal.       Toney Beckman [place]

## 2023-12-07 ENCOUNTER — TELEPHONE (OUTPATIENT)
Dept: CARDIOLOGY CLINIC | Facility: HOSPITAL | Age: 53
End: 2023-12-07

## 2023-12-07 NOTE — TELEPHONE ENCOUNTER
Attempted to contact patient to schedule an appointment with cardiology. A letter has been sent to have them contact our office.

## 2023-12-22 ENCOUNTER — APPOINTMENT (OUTPATIENT)
Dept: LAB | Facility: HOSPITAL | Age: 53
End: 2023-12-22
Payer: COMMERCIAL

## 2023-12-22 DIAGNOSIS — E78.5 DYSLIPIDEMIA: ICD-10-CM

## 2023-12-22 DIAGNOSIS — Z00.00 ROUTINE ADULT HEALTH MAINTENANCE: ICD-10-CM

## 2023-12-22 DIAGNOSIS — Z92.89 HISTORY OF CARDIOVERSION: ICD-10-CM

## 2023-12-22 DIAGNOSIS — I48.91 ATRIAL FIBRILLATION WITH RVR (HCC): ICD-10-CM

## 2023-12-22 DIAGNOSIS — Z12.5 SCREENING FOR PROSTATE CANCER: ICD-10-CM

## 2023-12-22 DIAGNOSIS — F41.9 ANXIETY: ICD-10-CM

## 2023-12-22 LAB
ALBUMIN SERPL BCP-MCNC: 4.3 G/DL (ref 3.5–5)
ALP SERPL-CCNC: 76 U/L (ref 34–104)
ALT SERPL W P-5'-P-CCNC: 16 U/L (ref 7–52)
ANION GAP SERPL CALCULATED.3IONS-SCNC: 7 MMOL/L
AST SERPL W P-5'-P-CCNC: 13 U/L (ref 13–39)
BASOPHILS # BLD AUTO: 0.03 THOUSANDS/ÂΜL (ref 0–0.1)
BASOPHILS NFR BLD AUTO: 1 % (ref 0–1)
BILIRUB SERPL-MCNC: 0.51 MG/DL (ref 0.2–1)
BUN SERPL-MCNC: 11 MG/DL (ref 5–25)
CALCIUM SERPL-MCNC: 9.5 MG/DL (ref 8.4–10.2)
CHLORIDE SERPL-SCNC: 104 MMOL/L (ref 96–108)
CHOLEST SERPL-MCNC: 156 MG/DL
CO2 SERPL-SCNC: 28 MMOL/L (ref 21–32)
CREAT SERPL-MCNC: 0.9 MG/DL (ref 0.6–1.3)
EOSINOPHIL # BLD AUTO: 0.1 THOUSAND/ÂΜL (ref 0–0.61)
EOSINOPHIL NFR BLD AUTO: 2 % (ref 0–6)
ERYTHROCYTE [DISTWIDTH] IN BLOOD BY AUTOMATED COUNT: 11.5 % (ref 11.6–15.1)
GFR SERPL CREATININE-BSD FRML MDRD: 97 ML/MIN/1.73SQ M
GLUCOSE P FAST SERPL-MCNC: 94 MG/DL (ref 65–99)
HCT VFR BLD AUTO: 47 % (ref 36.5–49.3)
HDLC SERPL-MCNC: 41 MG/DL
HGB BLD-MCNC: 15.7 G/DL (ref 12–17)
IMM GRANULOCYTES # BLD AUTO: 0.01 THOUSAND/UL (ref 0–0.2)
IMM GRANULOCYTES NFR BLD AUTO: 0 % (ref 0–2)
LDLC SERPL CALC-MCNC: 96 MG/DL (ref 0–100)
LYMPHOCYTES # BLD AUTO: 2.23 THOUSANDS/ÂΜL (ref 0.6–4.47)
LYMPHOCYTES NFR BLD AUTO: 37 % (ref 14–44)
MCH RBC QN AUTO: 29.5 PG (ref 26.8–34.3)
MCHC RBC AUTO-ENTMCNC: 33.4 G/DL (ref 31.4–37.4)
MCV RBC AUTO: 88 FL (ref 82–98)
MONOCYTES # BLD AUTO: 0.72 THOUSAND/ÂΜL (ref 0.17–1.22)
MONOCYTES NFR BLD AUTO: 12 % (ref 4–12)
NEUTROPHILS # BLD AUTO: 2.9 THOUSANDS/ÂΜL (ref 1.85–7.62)
NEUTS SEG NFR BLD AUTO: 48 % (ref 43–75)
NONHDLC SERPL-MCNC: 115 MG/DL
NRBC BLD AUTO-RTO: 0 /100 WBCS
PLATELET # BLD AUTO: 267 THOUSANDS/UL (ref 149–390)
PMV BLD AUTO: 9.2 FL (ref 8.9–12.7)
POTASSIUM SERPL-SCNC: 4.2 MMOL/L (ref 3.5–5.3)
PROT SERPL-MCNC: 6.7 G/DL (ref 6.4–8.4)
PSA SERPL-MCNC: 0.37 NG/ML (ref 0–4)
RBC # BLD AUTO: 5.33 MILLION/UL (ref 3.88–5.62)
SODIUM SERPL-SCNC: 139 MMOL/L (ref 135–147)
TRIGL SERPL-MCNC: 97 MG/DL
TSH SERPL DL<=0.05 MIU/L-ACNC: 1.67 UIU/ML (ref 0.45–4.5)
WBC # BLD AUTO: 5.99 THOUSAND/UL (ref 4.31–10.16)

## 2023-12-22 PROCEDURE — G0103 PSA SCREENING: HCPCS

## 2023-12-22 PROCEDURE — 36415 COLL VENOUS BLD VENIPUNCTURE: CPT

## 2023-12-22 PROCEDURE — 84443 ASSAY THYROID STIM HORMONE: CPT

## 2023-12-22 PROCEDURE — 80053 COMPREHEN METABOLIC PANEL: CPT

## 2023-12-22 PROCEDURE — 85025 COMPLETE CBC W/AUTO DIFF WBC: CPT

## 2023-12-22 PROCEDURE — 80061 LIPID PANEL: CPT

## 2024-01-26 ENCOUNTER — OFFICE VISIT (OUTPATIENT)
Dept: CARDIOLOGY CLINIC | Facility: HOSPITAL | Age: 54
End: 2024-01-26
Payer: COMMERCIAL

## 2024-01-26 VITALS
DIASTOLIC BLOOD PRESSURE: 64 MMHG | HEIGHT: 72 IN | WEIGHT: 165.5 LBS | HEART RATE: 63 BPM | BODY MASS INDEX: 22.42 KG/M2 | SYSTOLIC BLOOD PRESSURE: 112 MMHG

## 2024-01-26 DIAGNOSIS — Z92.89 HISTORY OF CARDIOVERSION: ICD-10-CM

## 2024-01-26 DIAGNOSIS — I48.91 ATRIAL FIBRILLATION WITH RVR (HCC): Primary | ICD-10-CM

## 2024-01-26 DIAGNOSIS — E78.5 DYSLIPIDEMIA: ICD-10-CM

## 2024-01-26 PROBLEM — Z12.5 SCREENING FOR PROSTATE CANCER: Status: RESOLVED | Noted: 2023-11-27 | Resolved: 2024-01-26

## 2024-01-26 PROCEDURE — 99214 OFFICE O/P EST MOD 30 MIN: CPT | Performed by: PHYSICIAN ASSISTANT

## 2024-01-26 PROCEDURE — 93000 ELECTROCARDIOGRAM COMPLETE: CPT | Performed by: PHYSICIAN ASSISTANT

## 2024-01-26 NOTE — PROGRESS NOTES
Cardiology Follow Up    Franklin Barcenas  1970  [id number]  [facility] [address] [id number] [phone]    1. History of atrial fibrillation with RVR (HCC)        2. History of cardioversion        3. Dyslipidemia            Discussion/Summary:  He presents to the office today for routine follow up. He offers no cardiopulmonary complaints.    He has a history of atrial fibrillation with RVR which was found at the time of a syncopal episode. He underwent cardioversion. He has not had any documented recurrent atrial fibrillation. He had one brief episode of palpitations during the summer which may have been in the setting of dehydration but has not had recurrence. He now has a smart watch which can detect irregular heart rhythms and has the capability to do an EKG.  He will notify the office with any recurrent palpitations or any atrial fibrillation. He was advised to stay well hydrated. He will continue metoprolol succinate.  He remains off of anticoagulation given his [id number] score of 0.     Lipids have improved per last lipid panel. He has made many dietary changes in the recent past. He remains on Zetia.    His blood pressure is well controlled.    No testing is advised.    He will RTO in 1 year with Dr. Lowery or sooner if necessary. He will call the office with any concerns in the interim.    Interval History:   Franklin Barcenas is a 53 y.o. male with prior syncope in 2018 found to have new onset atrial fibrillation with RVR  s/p cardioversion and dyslipidemia who presents to the office today for routine follow up.     Since his last office visit he has been feeling well. He reports one episode over the summer where he felt palpitations. He was outside in the heat and likely was not hydrating well. He sat down, drank a few bottles of water, and after about 15 minutes his  palpitations resolved. He has not had any recurrent episodes. He sometimes walks 13,000 steps in an 8 hour shift at work. He denies any exertional chest pain/pressure/discomfort or shortness of breath. He denies lower extremity edema, orthopnea, or PND. Denies lightheadedness, dizziness, or syncope. He does not smoke.     Medical Problems       Problem List       History of atrial fibrillation with RVR (HCC)    Smoker    Leukocytosis    History of cardioversion    Anxiety    Dyslipidemia    Routine adult health maintenance    Mixed hyperlipidemia    Screening for prostate cancer        Past Medical History:   Diagnosis Date    H/O von Willebrand's disease     Migraines     Wrist fracture     left     Social History     Socioeconomic History    Marital status: /Civil Union     Spouse name: Not on file    Number of children: Not on file    Years of education: Not on file    Highest education level: Not on file   Occupational History    Not on file   Tobacco Use    Smoking status: Former     Current packs/day: 0.00     Types: Cigarettes     Quit date: 2018     Years since quittin.7    Smokeless tobacco: Never   Vaping Use    Vaping status: Never Used   Substance and Sexual Activity    Alcohol use: Yes     Comment: occasional    Drug use: No    Sexual activity: Yes     Partners: Female   Other Topics Concern    Not on file   Social History Narrative    Not on file     Social Determinants of Health     Financial Resource Strain: Not on file   Food Insecurity: Not on file   Transportation Needs: Not on file   Physical Activity: Not on file   Stress: Not on file   Social Connections: Not on file   Intimate Partner Violence: Not on file   Housing Stability: Not on file      Family History   Problem Relation Age of Onset    Breast cancer Mother     Heart attack Father      Past Surgical History:   Procedure Laterality Date    [place] CARDIOVERSION ELECTIVE ARRHYTHMIA EXTERNAL N/A 5/15/2018    Procedure:  "CARDIOVERSION;  Surgeon: Ary Lowery DO;  Location: [facility];  Service: Cardiology    [place] ECHO TRANSESOPHAG R-T 2D W/PRB IMG ACQUISJ I&R N/A 5/15/2018    Procedure: TRANSESOPHAGEAL ECHOCARDIOGRAM (AGNIESZKA);  Surgeon: Ary Lowery DO;  Location: [facility];  Service: Cardiology    WISDOM TOOTH EXTRACTION         Current Outpatient Medications:     ALPRAZolam (XANAX) 0.25 mg tablet, Take 1 tablet (0.25 mg total) by mouth 2 (two) times a day as needed for anxiety, Disp: 60 tablet, Rfl: 0    ezetimibe (ZETIA) 10 mg tablet, Take 1 tablet (10 mg total) by mouth daily, Disp: 30 tablet, Rfl: 3    metoprolol succinate (TOPROL-XL) 50 mg 24 hr tablet, take 1 tablet by mouth once daily, Disp: 30 tablet, Rfl: 8  Allergies   Allergen Reactions    Atorvastatin        Labs:     Chemistry        Component Value Date/Time    K 4.2 12/22/2023 0758     12/22/2023 0758    CO2 28 12/22/2023 0758    BUN 11 12/22/2023 0758    CREATININE 0.90 12/22/2023 0758        Component Value Date/Time    CALCIUM 9.5 12/22/2023 0758    ALKPHOS 76 12/22/2023 0758    AST 13 12/22/2023 0758    ALT 16 12/22/2023 0758            No results found for: \"CHOL\"  Lab Results   Component Value Date    HDL 41 12/22/2023    HDL 42 05/28/2022    HDL 36 (L) 09/19/2020     Lab Results   Component Value Date    LDLCALC 96 12/22/2023    LDLCALC 106 (H) 05/28/2022    LDLCALC 105 (H) 09/19/2020     Lab Results   Component Value Date    TRIG 97 12/22/2023    TRIG 118 05/28/2022    TRIG 91 09/19/2020     No results found for: \"CHOLHDL\"    Imaging: No results found.    ECG: normal sinus rhythm    Review of Systems   Musculoskeletal:  Positive for muscle cramps.   All other systems reviewed and are negative.      Vitals:    01/26/24 1224   BP: 112/64   Pulse: 63     Vitals:    01/26/24 1224   Weight: 75.1 kg (165 lb 8 oz)     Height: 6' (182.9 cm)   Body mass index is 22.45 kg/m².    Physical Exam:  Physical Exam  Vitals reviewed.   Constitutional:       General: He is not " in acute distress.     Appearance: He is well-developed. He is not diaphoretic.   HENT:      Head: Normocephalic and atraumatic.   Eyes:      Pupils: Pupils are equal, round, and reactive to light.   Neck:      Vascular: No carotid bruit.   Cardiovascular:      Rate and Rhythm: Normal rate and regular rhythm.      Pulses:           Radial pulses are 2+ on the right side and 2+ on the left side.        Dorsalis pedis pulses are 2+ on the right side and 2+ on the left side.      Heart sounds: S1 normal and S2 normal. No murmur heard.  Pulmonary:      Effort: No respiratory distress.      Breath sounds: Normal breath sounds. No wheezing or rales.   Musculoskeletal:         General: Normal range of motion.      Cervical back: Normal range of motion.      Right lower leg: No edema.      Left lower leg: No edema.   Skin:     General: Skin is warm and dry.      Findings: No erythema.   Neurological:      General: No focal deficit present.      Mental Status: He is alert and oriented to person, place, and time. Mental status is at baseline.   Psychiatric:         Mood and Affect: Mood normal.         Behavior: Behavior normal.

## 2024-02-21 PROBLEM — Z00.00 ROUTINE ADULT HEALTH MAINTENANCE: Status: RESOLVED | Noted: 2020-06-24 | Resolved: 2024-02-21

## 2024-04-10 DIAGNOSIS — I48.91 ATRIAL FIBRILLATION WITH RVR (HCC): ICD-10-CM

## 2024-04-10 DIAGNOSIS — E78.2 MIXED HYPERLIPIDEMIA: ICD-10-CM

## 2024-04-10 RX ORDER — METOPROLOL SUCCINATE 50 MG/1
TABLET, EXTENDED RELEASE ORAL
Qty: 30 TABLET | Refills: 5 | Status: SHIPPED | OUTPATIENT
Start: 2024-04-10

## 2024-04-10 RX ORDER — EZETIMIBE 10 MG/1
10 TABLET ORAL DAILY
Qty: 30 TABLET | Refills: 3 | Status: SHIPPED | OUTPATIENT
Start: 2024-04-10

## 2024-08-15 DIAGNOSIS — E78.2 MIXED HYPERLIPIDEMIA: ICD-10-CM

## 2024-08-15 RX ORDER — EZETIMIBE 10 MG/1
10 TABLET ORAL DAILY
Qty: 30 TABLET | Refills: 5 | Status: SHIPPED | OUTPATIENT
Start: 2024-08-15

## 2024-09-16 DIAGNOSIS — E78.2 MIXED HYPERLIPIDEMIA: ICD-10-CM

## 2024-09-16 DIAGNOSIS — I48.91 ATRIAL FIBRILLATION WITH RVR (HCC): ICD-10-CM

## 2024-09-17 RX ORDER — METOPROLOL SUCCINATE 50 MG/1
TABLET, EXTENDED RELEASE ORAL
Qty: 90 TABLET | Refills: 1 | Status: SHIPPED | OUTPATIENT
Start: 2024-09-17

## 2024-09-18 RX ORDER — EZETIMIBE 10 MG/1
10 TABLET ORAL DAILY
Qty: 90 TABLET | Refills: 1 | Status: SHIPPED | OUTPATIENT
Start: 2024-09-18

## 2025-01-24 DIAGNOSIS — U07.1 COVID-19: Primary | ICD-10-CM

## 2025-01-24 RX ORDER — METHYLPREDNISOLONE 4 MG/1
TABLET ORAL
Qty: 21 EACH | Refills: 0 | Status: SHIPPED | OUTPATIENT
Start: 2025-01-24

## 2025-01-29 ENCOUNTER — TELEPHONE (OUTPATIENT)
Age: 55
End: 2025-01-29

## 2025-01-29 NOTE — TELEPHONE ENCOUNTER
Kerri, wife of pt, called in stating that both her & her  temps are now between 98-99.1    Kerri states they still both don't feel 100%, but would like for Kylah to send a doc note of returning to work this Friday 01/31. To please send it via [employer].     Please advise & call pt back with update. Thank you!    Kerri: [phone]

## 2025-01-30 NOTE — TELEPHONE ENCOUNTER
"Spouse, Kerri, called to follow up on request for \"Return to Work\" note.    Please expedite this request as it was placed yesterday, 1/29/25, and is needed today.   "

## 2025-06-06 ENCOUNTER — OFFICE VISIT (OUTPATIENT)
Dept: CARDIOLOGY CLINIC | Facility: HOSPITAL | Age: 55
End: 2025-06-06
Payer: COMMERCIAL

## 2025-06-06 VITALS
BODY MASS INDEX: 22.21 KG/M2 | DIASTOLIC BLOOD PRESSURE: 68 MMHG | HEIGHT: 72 IN | HEART RATE: 73 BPM | WEIGHT: 164 LBS | SYSTOLIC BLOOD PRESSURE: 118 MMHG

## 2025-06-06 DIAGNOSIS — Z92.89 HISTORY OF CARDIOVERSION: ICD-10-CM

## 2025-06-06 DIAGNOSIS — E78.5 DYSLIPIDEMIA: ICD-10-CM

## 2025-06-06 DIAGNOSIS — I48.91 ATRIAL FIBRILLATION WITH RVR (HCC): Primary | ICD-10-CM

## 2025-06-06 PROCEDURE — 99214 OFFICE O/P EST MOD 30 MIN: CPT | Performed by: INTERNAL MEDICINE

## 2025-06-06 PROCEDURE — 93000 ELECTROCARDIOGRAM COMPLETE: CPT | Performed by: INTERNAL MEDICINE

## 2025-06-06 NOTE — PROGRESS NOTES
Cardiology Follow Up    Franklin Barcenas  1970  [id number]  [facility] [address] [address]    1. History of atrial fibrillation with RVR (HCC)  POCT ECG    Comprehensive metabolic panel    TSH, 3rd generation with Free T4 reflex      2. History of cardioversion  POCT ECG      3. Dyslipidemia  Lipid Panel With Direct LDL            Discussion/Summary:  Mr. Barcenas is a pleasant 55-year-old gentleman who presents to the office today for routine follow-up.  Since his last visit cardiac wise he has been feeling well.  He offers no cardiopulmonary complaints.    He continues to maintain normal sinus rhythm on his current AV shea blocking regimen to which no changes were made.  Given his low PPI2LU0-LPPc score he is not maintained on systemic anticoagulation.    His most recent lipids from 2023 were reviewed.  His numbers are acceptable on [facility]et[facility].  An updated lipid panel has been requested.    No testing is advised besides blood work as noted above.    I will see him back in the office in one year or sooner if deemed necessary.    Interval History:   Mr. Barcenas is a 55-year-old gentleman who presents to the office today for routine follow-up.     He remains active as part of his job.  With the activity he performs he is asymptomatic without any chest pain or shortness of breath.  He denies any signs or symptoms of congestive heart failure including lower extremity edema, paroxysmal nocturnal dyspnea, orthopnea, acute weight gain or increasing abdominal girth.  He denies lightheadedness, syncope or presyncope.  He denies any sensation of palpitations or symptoms reminiscent of his atrial fibrillation.  He denies recurrent syncope or presyncope.  He denies symptoms of claudication.      Problem List       Atrial fibrillation with RVR (HCC)    Syncope and collapse    Smoker     Acute pain of left shoulder    Facial laceration    Hypokalemia    Leukocytosis    Foreign body in pharynx    Fall at home, initial encounter          Past Medical History:   Diagnosis Date    H/O von Willebrand's disease     Migraines     Wrist fracture     left     Social History     Socioeconomic History    Marital status: /Civil Union     Spouse name: Not on file    Number of children: Not on file    Years of education: Not on file    Highest education level: Not on file   Occupational History    Not on file   Tobacco Use    Smoking status: Former     Current packs/day: 0.00     Types: Cigarettes     Quit date: 2018     Years since quittin.0    Smokeless tobacco: Never   Vaping Use    Vaping status: Never Used   Substance and Sexual Activity    Alcohol use: Yes     Comment: occasional    Drug use: No    Sexual activity: Yes     Partners: Female   Other Topics Concern    Not on file   Social History Narrative    Not on file     Social Drivers of Health     Financial Resource Strain: Not on file   Food Insecurity: Not on file   Transportation Needs: Not on file   Physical Activity: Not on file   Stress: Not on file   Social Connections: Not on file   Intimate Partner Violence: Not on file   Housing Stability: Not on file      Family History   Problem Relation Name Age of Onset    Breast cancer Mother      Heart attack Father       Past Surgical History:   Procedure Laterality Date    IA CARDIOVERSION ELECTIVE ARRHYTHMIA EXTERNAL N/A 5/15/2018    Procedure: CARDIOVERSION;  Surgeon: Ary Lowery DO;  Location: [facility];  Service: Cardiology    IA ECHO TRANSESOPHAG R-T 2D W/PRB IMG ACQUISJ I&R N/A 5/15/2018    Procedure: TRANSESOPHAGEAL ECHOCARDIOGRAM (AGNIESZKA);  Surgeon: Ary Lowery DO;  Location: [facility];  Service: Cardiology    WISDOM TOOTH EXTRACTION         Current Outpatient Medications:     ALPRAZolam (XANAX) 0.25 mg tablet, Take 1 tablet (0.25 mg total) by mouth 2 (two) times a day as needed  "for anxiety, Disp: 60 tablet, Rfl: 0    ezetimibe (ZETIA) 10 mg tablet, take 1 tablet by mouth once daily, Disp: 90 tablet, Rfl: 1    methylPREDNISolone 4 MG tablet therapy pack, Use as directed on package, Disp: 21 each, Rfl: 0    metoprolol succinate (TOPROL-XL) 50 mg 24 hr tablet, take 1 tablet by mouth once daily, Disp: 90 tablet, Rfl: 1  Allergies   Allergen Reactions    Atorvastatin        Labs:     Chemistry        Component Value Date/Time    K 4.2 12/22/2023 0758     12/22/2023 0758    CO2 28 12/22/2023 0758    BUN 11 12/22/2023 0758    CREATININE 0.90 12/22/2023 0758        Component Value Date/Time    CALCIUM 9.5 12/22/2023 0758    ALKPHOS 76 12/22/2023 0758    AST 13 12/22/2023 0758    ALT 16 12/22/2023 0758            No results found for: \"CHOL\"  Lab Results   Component Value Date    HDL 41 12/22/2023    HDL 42 05/28/2022    HDL 36 (L) 09/19/2020     Lab Results   Component Value Date    LDLCALC 96 12/22/2023    LDLCALC 106 (H) 05/28/2022    LDLCALC 105 (H) 09/19/2020     Lab Results   Component Value Date    TRIG 97 12/22/2023    TRIG 118 05/28/2022    TRIG 91 09/19/2020     No results found for: \"CHOLHDL\"    Imaging: Cta Head And Neck W Wo Contrast    Result Date: 5/13/2018  Narrative: CTA NECK AND BRAIN WITH AND WITHOUT CONTRAST INDICATION: syncope and collapse COMPARISON:   5/11/2018 head CT TECHNIQUE:  Routine CT imaging of the Brain without contrast.  Post contrast imaging was performed after administration of iodinated contrast through the neck and brain. Post contrast axial 0.625 mm images timed to opacify the arterial system.  3D rendering was performed on an independent workstation.   MIP reconstructions performed. Coronal reconstructions were performed of the noncontrast portion of the brain.  Radiation dose length product (DLP) for this visit:  1456 mGy-cm .  This examination, like all CT scans performed in the [facility], was performed utilizing techniques to " minimize radiation dose exposure, including the use of iterative reconstruction and automated exposure control.   IV Contrast:  100 mL of iohexol (OMNIPAQUE)  IMAGE QUALITY:   Diagnostic FINDINGS: NONCONTRAST BRAIN PARENCHYMA:  No intracranial mass, mass effect or midline shift. No acute intracranial hemorrhage. No CT signs of acute infarction. VENTRICLES AND EXTRA-AXIAL SPACES:  Normal for patient's age. VISUALIZED ORBITS AND PARANASAL SINUSES:  Unremarkable. CALVARIUM AND EXTRACRANIAL SOFT TISSUES:   Normal. CERVICAL VASCULATURE AORTIC ARCH AND GREAT VESSELS:  Normal aortic arch and great vessel origins. Normal visualized subclavian vessels. RIGHT VERTEBRAL ARTERY CERVICAL SEGMENT:  Normal origin. The vessel is normal in caliber throughout the neck. LEFT VERTEBRAL ARTERY CERVICAL SEGMENT:  Normal origin. The vessel is normal in caliber throughout the neck. RIGHT EXTRACRANIAL CAROTID SEGMENT:  Normal caliber common carotid artery.  Normal bifurcation and cervical internal carotid artery.  No stenosis or dissection. LEFT EXTRACRANIAL CAROTID SEGMENT:  Normal caliber common carotid artery.  Normal bifurcation and cervical internal carotid artery.  No stenosis or dissection. NASCET criteria was used to determine the degree of internal carotid artery diameter stenosis. INTRACRANIAL VASCULATURE INTERNAL CAROTID ARTERIES:  Normal enhancement of the intracranial portions of the internal carotid arteries.  Normal ophthalmic artery origins.  Normal ICA terminus. ANTERIOR CIRCULATION:  Symmetric A1 segments and anterior cerebral arteries with normal enhancement.  Normal anterior communicating artery. MIDDLE CEREBRAL ARTERY CIRCULATION:  M1 segment and middle cerebral artery branches demonstrate normal enhancement bilaterally. DISTAL VERTEBRAL ARTERIES:  Normal distal vertebral arteries.  Posterior inferior cerebellar artery origins are normal. Normal vertebral basilar junction. BASILAR ARTERY:  Basilar artery is normal in  caliber.  Normal superior cerebellar arteries. POSTERIOR CEREBRAL ARTERIES: Both posterior cerebral arteries arises from the basilar tip.  Both arteries demonstrate normal enhancement.   Normal posterior communicating arteries. DURAL VENOUS SINUSES:  Normal. NON VASCULAR ANATOMY BONY STRUCTURES:  No acute osseous abnormality. SOFT TISSUES OF THE NECK:  Unremarkable. THORACIC INLET:  Unremarkable.     Impression: No significant brain parenchymal abnormalities, consistent with prior study No acute intracranial hemorrhage or mass effect No evidence of significant stenosis, dissection or occlusion involving cervical carotid or vertebral segments or visualized cerebral arteries Workstation performed: [id number]     Xr Neck Soft Tissue    Result Date: 5/13/2018  Narrative: NECK  SOFT TISSUE EXAMINATION INDICATION:   Foreign body. COMPARISON:  None VIEWS:  XR NECK SOFT TISSUE FINDINGS: The epiglottis and aryepiglottic folds are unremarkable in appearance. No radiopaque foreign bodies are appreciated. There are no osseous abnormalities.     Impression: Unremarkable study. Workstation performed: [id number]     Xr Chest 1 View Portable    Result Date: 5/11/2018  Narrative: CHEST INDICATION:   Trauma. COMPARISON:  None EXAM PERFORMED/VIEWS:  XR CHEST PORTABLE  AP supine portable FINDINGS: Questionable superior mediastinal widening which could be from brachiocephalic vessels/positioning, follow-up with repeat PA and lateral chest x-rays. The lungs are clear.  No pleural effusion. Osseous structures appear within normal limits for patient age.     Impression: Questionable superior mediastinal widening which could be from brachiocephalic vessels/positioning, follow-up with repeat PA and lateral chest x-rays. Workstation performed: [id number]     Xr Chest 2 Views    Result Date: 5/11/2018  Narrative: CHEST INDICATION: Trauma, follow-up COMPARISON:  Previous exam same date EXAM PERFORMED/VIEWS:  XR CHEST PA & LATERAL FINDINGS:  Persistent somewhat oblique right paratracheal density possibly representing brachiocephalic vessels.  Adenopathy could appear similarly.  Vascular injury considered less likely. Normal cardiac silhouette. The lungs are clear.  No pneumothorax or pleural effusion. Osseous structures appear within normal limits for patient age.     Impression: Persistent right paratracheal density suspicious for brachiocephalic vessels or adenopathy.  Vascular injury considered less likely Workstation performed: [id number]     Xr Shoulder 2+ Views Left    Result Date: 5/11/2018  Narrative: LEFT SHOULDER INDICATION:   trauma. COMPARISON:  None VIEWS:  XR SHOULDER 2+ VW LEFT FINDINGS: There is no acute fracture or dislocation. No significant degenerative changes. No lytic or blastic lesions are seen. Soft tissues are unremarkable.     Impression: No acute osseous abnormality. Workstation performed: [id number]     Ct Head Wo Contrast    Result Date: 5/11/2018  Narrative: CT BRAIN - WITHOUT CONTRAST INDICATION:   trauma. COMPARISON:  None. TECHNIQUE:  CT examination of the brain was performed.  In addition to axial images, coronal 2D reformatted images were created and submitted for interpretation.  Radiation dose length product (DLP) for this visit:  1077 mGy-cm .  This examination, like all CT scans performed in the [facility], was performed utilizing techniques to minimize radiation dose exposure, including the use of iterative reconstruction and automated exposure control.  IMAGE QUALITY:  Diagnostic. FINDINGS: PARENCHYMA:  No intracranial mass, mass effect or midline shift. No CT signs of acute infarction.  No acute intracranial hemorrhage. VENTRICLES AND EXTRA-AXIAL SPACES:  Normal for the patient's age. VISUALIZED ORBITS AND PARANASAL SINUSES:  Unremarkable. CALVARIUM AND EXTRACRANIAL SOFT TISSUES:  Normal.     Impression: No acute intracranial abnormality. Workstation performed: [id number]     Ct Facial  Bones Wo Contrast    Result Date: 5/11/2018  Narrative: CT FACIAL BONES WITHOUT INTRAVENOUS CONTRAST INDICATION:   facial trauma. COMPARISON: None. TECHNIQUE:  Axial CT images were obtained through the facial bones with additional sagittal and coronal reconstructions. Radiation dose length product (DLP) for this visit:  358 mGy-cm .  This examination, like all CT scans performed in the [facility], was performed utilizing techniques to minimize radiation dose exposure, including the use of iterative reconstruction and automated exposure control.  IMAGE QUALITY:  Diagnostic. FINDINGS: FACIAL BONES:   No facial bone fracture identified.  Normal alignment of the temporomandibular joints.  No lytic or blastic lesion. ORBITS:  Orbital globes, optic nerves, and extraocular muscles appear symmetric and normal. There is no evidence of retrobulbar mass, abscess, or hematoma. SINUSES:  Normal. SOFT TISSUES:  There is soft tissue swelling at the anterior right mandibular region.     Impression: No facial bone fracture or subluxation. Workstation performed: [id number]     Ct Spine Cervical Wo Contrast    Result Date: 5/11/2018  Narrative: CT CERVICAL SPINE - WITHOUT CONTRAST INDICATION:   trauma. COMPARISON: None. TECHNIQUE:  CT examination of the cervical spine was performed without intravenous contrast.  Contiguous axial images were obtained.  Sagittal and coronal reconstructions were performed.  Radiation dose length product (DLP) for this visit:  529 mGy-cm .  This examination, like all CT scans performed in the [facility], was performed utilizing techniques to minimize radiation dose exposure, including the use of iterative reconstruction and automated exposure control.  IMAGE QUALITY:  Diagnostic. FINDINGS: ALIGNMENT:  Normal alignment of the cervical spine. No subluxation. VERTEBRAL BODIES:  No fracture.  There are subcentimeter sclerotic densities noted at the left aspect of C1 and C4  likely representing bone islands. DEGENERATIVE CHANGES:  No significant cervical degenerative changes are noted. PREVERTEBRAL AND PARASPINAL SOFT TISSUES:  There is inspissated material noted at the dependent portion of the hypopharynx with an approximately 7 x 3 mm metallic density noted at the posterior midline the level of the piriform sinuses. THORACIC INLET:  Normal.     Impression: No cervical spine fracture or traumatic malalignment. Subcentimeter metallic density noted within the dependent portion of the hypopharynx at the level of the piriform sinuses.  The findings may be related to an aspirated dental amalgam.  ENT consultation is suggested.  I personally discussed this study with HOMERO LOPEZJazmyn 5/11/2018 at 7:02 PM.  Workstation performed: [id number]     Cta Dissection Protocol Chest And Abdomen    Result Date: 5/11/2018  Narrative: CTA - CHEST AND ABDOMEN  - WITHOUT AND WITH IV CONTRAST INDICATION:   Chest pain. COMPARISON:  None. TECHNIQUE: CT examination of the chest and abdomen was performed both prior to and after the administration of intravenous contrast.  Thin section angiographic arterial phase post contrast technique was used in order to evaluate for aortic dissection.  3D reformatted images and volume rendering were performed on an independent workstation.  Additionally, axial, sagittal, and coronal 2D reformatted images were created from the source data and submitted for interpretation. Radiation dose length product (DLP) for this visit:  1033 mGy-cm .  This examination, like all CT scans performed in the [facility], was performed utilizing techniques to minimize radiation dose exposure, including the use of iterative reconstruction and automated exposure control. IV Contrast:  100 mL of iohexol (OMNIPAQUE) Enteric Contrast: Enteric contrast was not administered. FINDINGS: AORTA: There is no aortic dissection or intramural hematoma. There is no aortic aneurysm.  There is a  prominent ductus bump/outpouching at the ligamentum arteriosum, likely congenital.  Superior indentation on the celiac axis best seen on the sagittal view 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 to median arcuate ligament syndrome. CHEST LUNGS:  Bibasilar subsegmental atelectasis. PLEURA:  Unremarkable. HEART/PULMONARY ARTERIAL TREE:  Heart is unremarkable for the patient's age.  Within the limitations of this examination there is no evidence of pulmonary embolus. MEDIASTINUM AND HORACIO:  Unremarkable. CHEST WALL AND LOWER NECK:   Unremarkable. ABDOMEN LIVER/BILIARY TREE:  Mild hepatic steatosis and hepatomegaly. GALLBLADDER:  No calcified gallstones. No pericholecystic inflammatory change. SPLEEN:  Unremarkable. PANCREAS:  Unremarkable. ADRENAL GLANDS:  Unremarkable. KIDNEYS/URETERS:  Unremarkable. No hydronephrosis. VISUALIZED STOMACH AND BOWEL:  Mildly thickened proximal transverse colon likely from underdistention rather than colitis.  VISUALIZED ABDOMINOPELVIC CAVITY:  No ascites or free intraperitoneal air. No lymphadenopathy.  ABDOMINAL WALL:  Unremarkable. OSSEOUS STRUCTURES:  No acute fracture or destructive osseous lesion.     Impression: 1.  No evidence of aneurysm or dissection.  2.  Clear lungs. 3.  Mild hepatic steatosis and hepatomegaly. 4.  Superior indentation on the celiac axis best seen on the sagittal view 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 to median arcuate ligament syndrome. Workstation performed: [id number]       ROS    Vitals:    06/06/25 0908   BP: 118/68   Pulse: 73       Vitals:    06/06/25 0908   Weight: 74.4 kg (164 lb)       Height: 6' (182.9 cm)   Body mass index is 22.24 kg/m².    Physical Exam:  General:  Alert and cooperative, appears stated age  HEENT:  PERRLA, EOMI, no scleral icterus, no conjunctival pallor  Neck:  No lymphadenopathy, no thyromegaly, no carotid bruits, no elevated JVP  Heart:  Regular rate and rhythm, normal S1/S2, no S3/S4, no murmur  Lungs:  Clear to auscultation bilaterally   Abdomen:  Soft, non-tender,  positive bowel sounds, no rebound or guarding,   no organomegaly   Extremities:  No clubbing, cyanosis or edema   Vascular:  2+ pedal pulses  Skin:  No rashes or lesions on exposed skin  Neurologic:  Cranial nerves II-XII grossly intact without focal deficits

## (undated) DEVICE — ELEC QUIK COMBO